# Patient Record
Sex: FEMALE | Race: WHITE | Employment: FULL TIME | URBAN - METROPOLITAN AREA
[De-identification: names, ages, dates, MRNs, and addresses within clinical notes are randomized per-mention and may not be internally consistent; named-entity substitution may affect disease eponyms.]

---

## 2017-01-13 ENCOUNTER — TELEPHONE (OUTPATIENT)
Dept: PEDIATRICS | Facility: CLINIC | Age: 22
End: 2017-01-13

## 2017-01-13 ENCOUNTER — HOSPITAL ENCOUNTER (EMERGENCY)
Facility: CLINIC | Age: 22
Discharge: HOME | End: 2017-01-13
Attending: EMERGENCY MEDICINE | Admitting: EMERGENCY MEDICINE
Payer: COMMERCIAL

## 2017-01-13 VITALS
RESPIRATION RATE: 20 BRPM | DIASTOLIC BLOOD PRESSURE: 102 MMHG | HEART RATE: 111 BPM | TEMPERATURE: 98 F | SYSTOLIC BLOOD PRESSURE: 166 MMHG | OXYGEN SATURATION: 97 %

## 2017-01-13 DIAGNOSIS — K92.1 BLOOD IN STOOL: ICD-10-CM

## 2017-01-13 DIAGNOSIS — R00.0 TACHYCARDIA: ICD-10-CM

## 2017-01-13 LAB
APTT PPP: 30 SEC (ref 22–37)
BASOPHILS # BLD AUTO: 0.1 10E9/L (ref 0–0.2)
BASOPHILS NFR BLD AUTO: 0.4 %
DIFFERENTIAL METHOD BLD: ABNORMAL
EOSINOPHIL # BLD AUTO: 0.2 10E9/L (ref 0–0.7)
EOSINOPHIL NFR BLD AUTO: 1.9 %
ERYTHROCYTE [DISTWIDTH] IN BLOOD BY AUTOMATED COUNT: 15.4 % (ref 10–15)
HCG SERPL QL: NEGATIVE
HCT VFR BLD AUTO: 37.4 % (ref 35–47)
HGB BLD-MCNC: 12.1 G/DL (ref 11.7–15.7)
IMM GRANULOCYTES # BLD: 0 10E9/L (ref 0–0.4)
IMM GRANULOCYTES NFR BLD: 0.3 %
INR PPP: 0.93 (ref 0.86–1.14)
LYMPHOCYTES # BLD AUTO: 3 10E9/L (ref 0.8–5.3)
LYMPHOCYTES NFR BLD AUTO: 24.9 %
MCH RBC QN AUTO: 26.4 PG (ref 26.5–33)
MCHC RBC AUTO-ENTMCNC: 32.4 G/DL (ref 31.5–36.5)
MCV RBC AUTO: 82 FL (ref 78–100)
MONOCYTES # BLD AUTO: 0.8 10E9/L (ref 0–1.3)
MONOCYTES NFR BLD AUTO: 6.9 %
NEUTROPHILS # BLD AUTO: 8 10E9/L (ref 1.6–8.3)
NEUTROPHILS NFR BLD AUTO: 65.6 %
NRBC # BLD AUTO: 0 10*3/UL
NRBC BLD AUTO-RTO: 0 /100
PLATELET # BLD AUTO: 365 10E9/L (ref 150–450)
RBC # BLD AUTO: 4.59 10E12/L (ref 3.8–5.2)
WBC # BLD AUTO: 12.2 10E9/L (ref 4–11)

## 2017-01-13 PROCEDURE — 99283 EMERGENCY DEPT VISIT LOW MDM: CPT

## 2017-01-13 PROCEDURE — 84703 CHORIONIC GONADOTROPIN ASSAY: CPT | Performed by: EMERGENCY MEDICINE

## 2017-01-13 PROCEDURE — 85610 PROTHROMBIN TIME: CPT | Performed by: EMERGENCY MEDICINE

## 2017-01-13 PROCEDURE — 85025 COMPLETE CBC W/AUTO DIFF WBC: CPT | Performed by: EMERGENCY MEDICINE

## 2017-01-13 PROCEDURE — 85730 THROMBOPLASTIN TIME PARTIAL: CPT | Performed by: EMERGENCY MEDICINE

## 2017-01-13 ASSESSMENT — ENCOUNTER SYMPTOMS
FEVER: 0
BLOOD IN STOOL: 1
ABDOMINAL PAIN: 0
DIARRHEA: 1
NAUSEA: 0
VOMITING: 0

## 2017-01-13 NOTE — ED PROVIDER NOTES
History     Chief Complaint:  Rectal Bleeding    HPI   Susy Huerta is a 21 year old female who presents to the emergency department today for evaluation of rectal bleeding. The patient reports that for the past couple of months she has had some intermittent bright red blood drip out of her rectum after bowel movements. She affirms that her stool is not mixed with blood, but rather dripping out after a bowel movement. She states that this is painless, but is concerned today as this is the second time this has happened and was told that her grandfather has a history of colon cancer. She notes that the blood is not mixed with the stool and reports no known history of hemorrhoids or fissures. The patient does note that she has frequent bowel movements however; sometimes up to 4 per day. She denies any alcohol or excessive NSAID use, fevers, abdominal pain, or other concerns at this time.     Allergies:  Amoxicillin - Anaphylaxis  Cefzil - Hives  Sulfa Drugs - N/V      Medications:    Zoloft  Glucophage  Depo-Provera  Diprosone  Hydrocortisone cream 2.5%     Past Medical History:    Obese  Hypertension  Menorrhagia  Endometriosis      Past Surgical History:    Dilation and curretage     Family History:    Mother - Hypertension  Maternal Grandmother - Hypertension, Lung Cancer, Emphysema  Maternal Grandfather - Colon Cancer     Social History:  The patient was accompanied to the ED by her mother and sister.  Smoking Status: Negative  Alcohol Use: Negative  Marital Status:  Single [1]     Review of Systems   Constitutional: Negative for fever.   Gastrointestinal: Positive for diarrhea and blood in stool (Bright Red). Negative for nausea, vomiting and abdominal pain.   All other systems reviewed and are negative.    Physical Exam   Vitals:  Patient Vitals for the past 24 hrs:   BP Temp Temp src Pulse Resp SpO2   01/13/17 1900 - - - - - 97 %   01/13/17 1736 - - - - - 95 %   01/13/17 1732 - - - - - 98 %   01/13/17 1624 (!)  166/102 mmHg 98  F (36.7  C) Oral 111 20 100 %      Physical Exam\  General: Morbidly obese, appears comfortable.  HEENT:   The scalp and head appear normal    The pupils are equal, round, and reactive to light    Extraocular muscles are intact.    The nose is normal.    The oropharynx is normal.      Uvula is in the midline.  There is no peritonsillar abscess.  Neck:  Normal range of motion.    Lungs:  Clear.      No rales, no wheezing.      There is no tachypnea.  Non-labored.  Cardiac: Regular rate.      Normal S1 and S2.      No S3 or S4.      No pathological murmur.      No pericardial rub.  Abdomen: Soft. No distension. No tympani. No rebound. Non-tender.  :   Purplish interior hemorrhoids seen at the 6 o'clock and 9 o'clock position    No evidence of fissures. No pain on anoscopic evaluation. No external hemorrhoids. No blood noted. Clear mucous.   Lymph: No anterior or posterior cervical lymphadenopathy noted.  MS:  Normal tone.      Normal movement of all extremities.    Neuro:  Normal mentation.  No focal motor or sensory changes.      Speech normal.  Psych:  Awake.     Alert.      Normal affect.      Appropriate interactions.  Skin:  No rash.      No lesions.    Emergency Department Course     Laboratory:  Laboratory findings were communicated with the patient who voiced understanding of the findings.    CBC: WBC: 12.2 (H) o/w WNL. (HGB 12.1, )   HCG Qualitative Urine: Negative    INR: Pending  PTT: Pending    Emergency Department Course:  Nursing notes and vitals reviewed.  I performed an exam of the patient as documented above.   IV was inserted and blood was drawn for laboratory testing, results above.  At 1821 the patient was rechecked and was updated on the results of her laboratory and imaging studies.   I discussed the treatment plan with the patient and her mother. They expressed understanding of this plan and consented to discharge. They will be discharged home with instructions for care  "and follow up. In addition, the patient will return to the emergency department if their symptoms persist, worsen, if new symptoms arise or if there is any concern.  All questions were answered.  I personally reviewed the laboratory results with the patient and answered all related questions prior to discharge.    Impression & Plan      Medical Decision Making:  Susy Huerta is a 21 year old female who presents to the emergency department today for evaluation of internal hemorrhoids which is likely the source of her bright red blood per rectum and painless nature of her bleeding.     However, with her family history of maternal grandfather having had colon cancer and dying at age 63, I have recommended that she follow up with colorectal surgery. They can do further investigation if she continues to have bright red bleeding after banding of her internal hemorrhoids. I have recommended that she follow up [facility] and gave her the number to rubén.    In addition her heart rate was noted to be elevated. She admits to drinking coffee today and not drinking clear liquids well such as water or juice. She says \"I am known not to drink much.\" I recommended she push clear liquid hydration and avoiding diuretic beverages including caffeine and alcohol and have this rechecked on Monday a her primary doctors office. If she has any new bleeding or other concerns she should return immediately to the ED.    Diagnosis:    ICD-10-CM    1. Blood in stool K92.1    2. Tachycardia R00.0        Discharge Medications:  Discharge Medication List as of 1/13/2017  6:57 PM      START taking these medications    Details   psyllium 0.52 G capsule Take 1 capsule (0.52 g) by mouth daily, Disp-20 capsule, R-0, Local Print             Scribe Disclosure:  INoman, am serving as a scribe at 5:26 PM on 1/13/2017 to document services personally performed by Les Case MD, based on my " observations and the provider's statements to me.    1/13/2017   [facility] EMERGENCY DEPARTMENT        Les Case MD  01/13/17 4009

## 2017-01-13 NOTE — TELEPHONE ENCOUNTER
Susy Huerta is a 21 year old female who calls with rectal bleeding.    NURSING ASSESSMENT:  Description: dripping bright red rectal blood, not bleeding with BM.  Has last BM 2 days ago and it was normal, no blood in stool.  Dripping on the underwear, into the toilet.  She is positive it is rectal bleeding, not vaginal.  Onset/duration:  7 days, getting worse.  First time patient called.  Precip. factors:  Family hx of colon cancer  Associated symptoms:  Denies abdominal pain, or cramping,  No fevers.  No chills, or sweats.  No hx hemorrhoids.  Improves/worsens symptoms:  n/a  Pain scale (0-10)   0/10  LMP/preg/breast feeding:  unsure  Last exam/Treatment:  12/15/16 for stools-changes in stools  Allergies:   Allergies   Allergen Reactions     Amoxicillin Anaphylaxis     Cefzil [Cefprozil] Hives     Sulfa Drugs Other (See Comments)     n/v       MEDICATIONS:   Taking medication(s) as prescribed? Yes  Taking over the counter medication(s?) No  Any medication side effects? Not Applicable    Any barriers to taking medication(s) as prescribed?  No  Medication(s) improving/managing symptoms?  N/A  Medication reconciliation completed: Yes      NURSING PLAN: Nursing advice to patient ER evaluation    RECOMMENDED DISPOSITION:  To ED, another person to drive - mom will drive  Will comply with recommendation: Yes  If further questions/concerns or if symptoms do not improve, worsen or new symptoms develop, call your PCP or [facility] as soon as possible.      Guideline used:  Nursing experience    Janie Allen RN

## 2017-01-13 NOTE — ED AVS SNAPSHOT
[facility] Emergency Department    [address]    [facility] [address]    Phone:  [phone]    Fax:  [phone]                                       Susy Huerta   MRN: [id number]    Department:  [facility] Emergency Department   Date of Visit:  1/13/2017           After Visit Summary Signature Page     I have received my discharge instructions, and my questions have been answered. I have discussed any challenges I see with this plan with the nurse or doctor.    ..........................................................................................................................................  Patient/Patient Representative Signature      ..........................................................................................................................................  Patient Representative Print Name and Relationship to Patient    ..................................................               ................................................  Date                                            Time    ..........................................................................................................................................  Reviewed by Signature/Title    ...................................................              ..............................................  Date                                                            Time

## 2017-01-13 NOTE — ED AVS SNAPSHOT
[facility] Emergency Department    [address] [place] [address]    Phone:  [phone]    Fax:  [phone]                                       Susy Huerta   MRN: [id number]    Department:  [facility] Emergency Department   Date of Visit:  1/13/2017           Patient Information     Date Of Birth          1995        Your diagnoses for this visit were:     Blood in stool     Tachycardia        You were seen by Les Case MD.      Follow-up Information     Follow up with Yohan Beltran MD In 3 days.    Specialties:  Internal Medicine, Pediatrics    Contact information:    [facility]  [address] [place] [id number] [phone]          Follow up with Jessenia Kumar MD In 3 days.    Specialty:  Colon and Rectal Surgery    Contact information:    [facility]  [address] [place] [address]  [phone]          Discharge Instructions         Dehydration (Adult)  Dehydration occurs when your body loses too much fluid. This may be the result of prolonged vomiting or diarrhea, excessive sweating, or a high fever. It may also happen if you don t drink enough fluid when you re sick or out in the heat. Misuse of diuretics (water pills) can also be a cause.  Symptoms include thirst and decreased urine output. You may also feel dizzy, weak, fatigued, or very drowsy. The diet described below is usually enough to treat dehydration. In some cases, you may need medicine.  Home care    Drink at least 12 8-ounce glasses of fluid every day to resolve the dehydration. Fluid may include water; orange juice; lemonade; apple, grape, or cranberry juice; clear fruit drinks; electrolyte replacement and sports drinks; and teas and coffee without caffeine. If you have been diagnosed with a kidney disease, ask your doctor how much and what types of fluids you should drink to prevent dehydration. If you have kidney disease, fluid can build  up in the body. This can be dangerous to your health.     If you have a fever, muscle aches, or a headache as a result of a cold or flu, you may take acetaminophen or ibuprofen, unless another medicine was prescribed. If you have chronic liver or kidney disease, or have ever had a stomach ulcer or gastrointestinal bleeding, talk with your health care provider before using these medicines. Don't take aspirin if you are younger than 18 and have a fever. Aspirin raises the chance for severe liver injury.  Follow-up care  Follow up with your health care provider, or as advised.  When to seek medical advice  Call your health care provider right away if any of these occur:    Continued vomiting    Frequent diarrhea (more than 5 times a day); blood (red or black color) or mucus in diarrhea    Blood in vomit or stool    Swollen abdomen or increasing abdominal pain    Weakness, dizziness, or fainting    Unusual drowsiness or confusion    Reduced urine output or extreme thirst    Fever of 100.4 F (34 C) or higher     1481-9583 [employer]. [address] [place] [address]. All rights reserved. This information is not intended as a substitute for professional medical care. Always follow your healthcare professional's instructions.            When You Have Gastrointestinal (GI) Bleeding  Blood in vomit or stool can be a sign of gastrointestinal (GI) bleeding. GI bleeding can be scary, though the cause of the bleeding may not be serious. You should always see a doctor if GI bleeding occurs.  The GI tract    The GI tract is the path through which food travels in the body. Food passes from the mouth down the esophagus (the tube from the mouth to the stomach). Food begins to break down in the stomach. It then moves through the duodenum, the first part of the small intestine. Nutrients are absorbed as food travels through the small intestine. What is left passes into the colon (large intestine) as waste. The  colon removes water from the waste. Waste continues from the colon to the rectum (where stool is stored). Waste then leaves the body through the anus.  Causes of GI bleeding  GI bleeding can be caused by many different problems. Some of the more common causes include:    Hemorrhoids (swollen veins in the anus)    Varices (swollen veins in the esophagus)    Ulcer (sore on the lining of the GI tract)    Cuts or scrapes in the mouth or throat    Infection (bacteria or parasites)    Food allergies, such as gluten    Medications    Inflammation (swelling or irritation of the lining of the GI tract, such as gastritis or esophagitis)    Colitis (Crohn's disease or ulcerative colitis)    Cancer (tumors or polyps)    Diverticula (abnormal pouches in the colon)    Tears in the esophagus or anus    Nosebleed    Angiodysplasia, abnormal blood vessels in the GI tract  Diagnosing the cause of blood in stool  If blood is coming out in your stool, it may signal a lower GI tract problem. Bleeding from the GI tract can be bright red, or it may look dark and tarry. Occult blood can t be seen with the eye, but can be found in the stool on tests. To determine the cause, tests that may be ordered include:    Blood tests    Hemoccult test: checks a stool sample for blood    Stool culture: checks a stool sample for bacteria or parasites    X-ray, ultrasound, or CT scan: imaging tests that take pictures of the digestive tract    Colonoscopy or sigmoidoscopy: a test during which a flexible tube with a camera is inserted through the anus into the rectum to view the inside of your colon. This lets the doctor do a biopsy (take a tiny tissue sample and treat a bleeding source).  Diagnosing the cause of blood in vomit  Vomiting blood or a coffee ground material may signal an upper GI tract problem. To find the cause, tests that may be done include:    Upper Endoscopy: a test during which a flexible tube with a camera is inserted through the mouth  and throat to see inside the upper GI tract. This lets the doctor do a biopsy (take a tiny tissue sample and treat a bleeding source).    Nasogastric lavage: which can distinguish upper versus lower GI bleeding    X-ray, ultrasound, or CT scan: tests that take pictures of the digestive tract    Upper GI series: X-rays of the upper part of the GI tract taken from inside the body    Enteroscopy: sending a flexible tube or a small, swallowed capsule camer into the small intestine      Call your health care provider right away if you have any of the following:    Bleeding from the mouth or anus that can t be stopped    Fever of 100.4 F (38.0 ) or higher    Bleeding accompanied by lightheadedness or dizziness    Signs of dehydration (dry, sticky mouth; decreased urine output; very dark urine)    Abdominal pain     4468-4039 The "[employer] [address] [place] [address]. All rights reserved. This information is not intended as a substitute for professional medical care. Always follow your healthcare professional's instructions.    HAVE YOUR HEART RATE RECHECKED AT CLINIC ON Monday- IT SHOULD BE LESS THAN 100 NORMALLY IF YOU ARE NOT DEHYDRATED      Future Appointments        Provider Department Dept Phone Center    1/20/2017 4:00 PM AISHWARYA NURSE AB [facility] [phone] [facility]      24 Hour Appointment Hotline       To make an appointment at any [facility], call [phone] ([phone]). If you don't have a family doctor or clinic, we will help you find one. [facility] are conveniently located to serve the needs of you and your family.             Review of your medicines      START taking        Dose / Directions Last dose taken    psyllium 0.52 G capsule   Dose:  1 capsule   Quantity:  20 capsule        Take 1 capsule (0.52 g) by mouth daily   Refills:  0          Our records show that you are taking the medicines listed below. If these are incorrect, please call your family  doctor or clinic.        Dose / Directions Last dose taken    betamethasone dipropionate 0.05 % cream   Commonly known as:  DIPROSONE   Quantity:  45 g        Apply sparingly to affected area twice daily as needed.  Do not apply to face.   Refills:  0        hydrocortisone 2.5 % cream   Quantity:  20 g        Apply topically 2 times daily Can sub form if needed and size as needed   Refills:  3        medroxyPROGESTERone 150 MG/ML injection   Commonly known as:  DEPO-PROVERA   Dose:  150 mg   Quantity:  3 mL        Inject 1 mL (150 mg) into the muscle every 3 months   Refills:  3        metFORMIN 500 MG tablet   Commonly known as:  GLUCOPHAGE   Dose:  500 mg   Quantity:  60 tablet        Take 1 tablet (500 mg) by mouth 2 times daily (with meals)   Refills:  3        sertraline 100 MG tablet   Commonly known as:  ZOLOFT   Dose:  100 mg   Quantity:  90 tablet        Take 1 tablet (100 mg) by mouth daily   Refills:  0                Prescriptions were sent or printed at these locations (1 Prescription)                   Other Prescriptions                Printed at Department/Unit printer (1 of 1)         psyllium 0.52 G capsule                Procedures and tests performed during your visit     CBC with platelets differential    HCG QUALitative    INR    Partial thromboplastin time    Patient care order      Orders Needing Specimen Collection     None      Pending Results     Date and Time Order Name Status Description    1/13/2017 1726 Partial thromboplastin time In process     1/13/2017 1726 INR In process             Pending Culture Results     No orders found from 1/12/2017 to 1/14/2017.       Test Results from your hospital stay           1/13/2017  5:53 PM - Interface, Aivo Results      Component Results     Component Value Ref Range & Units Status    WBC 12.2 (H) 4.0 - 11.0 10e9/L Final    RBC Count 4.59 3.8 - 5.2 10e12/L Final    Hemoglobin 12.1 11.7 - 15.7 g/dL Final    Hematocrit 37.4 35.0 - 47.0 % Final     MCV 82 78 - 100 fl Final    MCH 26.4 (L) 26.5 - 33.0 pg Final    MCHC 32.4 31.5 - 36.5 g/dL Final    RDW 15.4 (H) 10.0 - 15.0 % Final    Platelet Count 365 150 - 450 10e9/L Final    Diff Method Automated Method  Final    % Neutrophils 65.6 % Final    % Lymphocytes 24.9 % Final    % Monocytes 6.9 % Final    % Eosinophils 1.9 % Final    % Basophils 0.4 % Final    % Immature Granulocytes 0.3 % Final    Nucleated RBCs 0 0 /100 Final    Absolute Neutrophil 8.0 1.6 - 8.3 10e9/L Final    Absolute Lymphocytes 3.0 0.8 - 5.3 10e9/L Final    Absolute Monocytes 0.8 0.0 - 1.3 10e9/L Final    Absolute Eosinophils 0.2 0.0 - 0.7 10e9/L Final    Absolute Basophils 0.1 0.0 - 0.2 10e9/L Final    Abs Immature Granulocytes 0.0 0 - 0.4 10e9/L Final    Absolute Nucleated RBC 0.0  Final         1/13/2017  6:11 PM - Interface, Flexilab Results      Component Results     Component Value Ref Range & Units Status    HCG Qualitative Serum Negative NEG Final         1/13/2017  5:48 PM - Interface, Flexilab Results         1/13/2017  5:48 PM - Interface, Flexilab Results                Clinical Quality Measure: Blood Pressure Screening     Your blood pressure was checked while you were in the emergency department today. The last reading we obtained was  BP: (!) 166/102 mmHg . Please read the guidelines below about what these numbers mean and what you should do about them.  If your systolic blood pressure (the top number) is less than 120 and your diastolic blood pressure (the bottom number) is less than 80, then your blood pressure is normal. There is nothing more that you need to do about it.  If your systolic blood pressure (the top number) is 120-139 or your diastolic blood pressure (the bottom number) is 80-89, your blood pressure may be higher than it should be. You should have your blood pressure rechecked within a year by a primary care provider.  If your systolic blood pressure (the top number) is 140 or greater or your diastolic blood  "pressure (the bottom number) is 90 or greater, you may have high blood pressure. High blood pressure is treatable, but if left untreated over time it can put you at risk for heart attack, stroke, or kidney failure. You should have your blood pressure rechecked by a primary care provider within the next 4 weeks.  If your provider in the emergency department today gave you specific instructions to follow-up with your doctor or provider even sooner than that, you should follow that instruction and not wait for up to 4 weeks for your follow-up visit.        Thank you for choosing [facility]       Thank you for choosing [facility] for your care. Our goal is always to provide you with excellent care. Hearing back from our patients is one way we can continue to improve our services. Please take a few minutes to complete the written survey that you may receive in the mail after you visit with us. Thank you!        [facility] [employer] Information     [employer] lets you send messages to your doctor, view your test results, renew your prescriptions, schedule appointments and more. To sign up, go to [email or web address] . Click on \"Log in\" on the left side of the screen, which will take you to the Welcome page. Then click on \"Sign up Now\" on the right side of the page.     You will be asked to enter the access code listed below, as well as some personal information. Please follow the directions to create your username and password.     Your access code is: [email or web address]  Expires: 3/15/2017  2:41 PM     Your access code will  in 90 days. If you need help or a new code, please call your [facility] clinic or [phone].        Care EveryWhere ID     This is your Care EveryWhere ID. This could be used by other organizations to access your [facility] medical records  [id number]        After Visit Summary       This is your record. Keep this with you and show to your community pharmacist(s) and doctor(s) at your next visit.                  "

## 2017-01-13 NOTE — ED NOTES
A&Ox3, ABC's intact  Pt c/o bright red bleeding per rectum after BM's on and off for a couple months.  PMH: See hx  Meds: Epic up to date per pt

## 2017-01-14 NOTE — DISCHARGE INSTRUCTIONS
Dehydration (Adult)  Dehydration occurs when your body loses too much fluid. This may be the result of prolonged vomiting or diarrhea, excessive sweating, or a high fever. It may also happen if you don t drink enough fluid when you re sick or out in the heat. Misuse of diuretics (water pills) can also be a cause.  Symptoms include thirst and decreased urine output. You may also feel dizzy, weak, fatigued, or very drowsy. The diet described below is usually enough to treat dehydration. In some cases, you may need medicine.  Home care    Drink at least 12 8-ounce glasses of fluid every day to resolve the dehydration. Fluid may include water; orange juice; lemonade; apple, grape, or cranberry juice; clear fruit drinks; electrolyte replacement and sports drinks; and teas and coffee without caffeine. If you have been diagnosed with a kidney disease, ask your doctor how much and what types of fluids you should drink to prevent dehydration. If you have kidney disease, fluid can build up in the body. This can be dangerous to your health.     If you have a fever, muscle aches, or a headache as a result of a cold or flu, you may take acetaminophen or ibuprofen, unless another medicine was prescribed. If you have chronic liver or kidney disease, or have ever had a stomach ulcer or gastrointestinal bleeding, talk with your health care provider before using these medicines. Don't take aspirin if you are younger than 18 and have a fever. Aspirin raises the chance for severe liver injury.  Follow-up care  Follow up with your health care provider, or as advised.  When to seek medical advice  Call your health care provider right away if any of these occur:    Continued vomiting    Frequent diarrhea (more than 5 times a day); blood (red or black color) or mucus in diarrhea    Blood in vomit or stool    Swollen abdomen or increasing abdominal pain    Weakness, dizziness, or fainting    Unusual drowsiness or confusion    Reduced  urine output or extreme thirst    Fever of 100.4 F (34 C) or higher     2617-3170 [employer]. [address] [place] [address], [place] [address]. All rights reserved. This information is not intended as a substitute for professional medical care. Always follow your healthcare professional's instructions.            When You Have Gastrointestinal (GI) Bleeding  Blood in vomit or stool can be a sign of gastrointestinal (GI) bleeding. GI bleeding can be scary, though the cause of the bleeding may not be serious. You should always see a doctor if GI bleeding occurs.  The GI tract    The GI tract is the path through which food travels in the body. Food passes from the mouth down the esophagus (the tube from the mouth to the stomach). Food begins to break down in the stomach. It then moves through the duodenum, the first part of the small intestine. Nutrients are absorbed as food travels through the small intestine. What is left passes into the colon (large intestine) as waste. The colon removes water from the waste. Waste continues from the colon to the rectum (where stool is stored). Waste then leaves the body through the anus.  Causes of GI bleeding  GI bleeding can be caused by many different problems. Some of the more common causes include:    Hemorrhoids (swollen veins in the anus)    Varices (swollen veins in the esophagus)    Ulcer (sore on the lining of the GI tract)    Cuts or scrapes in the mouth or throat    Infection (bacteria or parasites)    Food allergies, such as gluten    Medications    Inflammation (swelling or irritation of the lining of the GI tract, such as gastritis or esophagitis)    Colitis (Crohn's disease or ulcerative colitis)    Cancer (tumors or polyps)    Diverticula (abnormal pouches in the colon)    Tears in the esophagus or anus    Nosebleed    Angiodysplasia, abnormal blood vessels in the GI tract  Diagnosing the cause of blood in stool  If blood is coming out in your stool, it may  signal a lower GI tract problem. Bleeding from the GI tract can be bright red, or it may look dark and tarry. Occult blood can t be seen with the eye, but can be found in the stool on tests. To determine the cause, tests that may be ordered include:    Blood tests    Hemoccult test: checks a stool sample for blood    Stool culture: checks a stool sample for bacteria or parasites    X-ray, ultrasound, or CT scan: imaging tests that take pictures of the digestive tract    Colonoscopy or sigmoidoscopy: a test during which a flexible tube with a camera is inserted through the anus into the rectum to view the inside of your colon. This lets the doctor do a biopsy (take a tiny tissue sample and treat a bleeding source).  Diagnosing the cause of blood in vomit  Vomiting blood or a coffee ground material may signal an upper GI tract problem. To find the cause, tests that may be done include:    Upper Endoscopy: a test during which a flexible tube with a camera is inserted through the mouth and throat to see inside the upper GI tract. This lets the doctor do a biopsy (take a tiny tissue sample and treat a bleeding source).    Nasogastric lavage: which can distinguish upper versus lower GI bleeding    X-ray, ultrasound, or CT scan: tests that take pictures of the digestive tract    Upper GI series: X-rays of the upper part of the GI tract taken from inside the body    Enteroscopy: sending a flexible tube or a small, swallowed capsule camer into the small intestine      Call your health care provider right away if you have any of the following:    Bleeding from the mouth or anus that can t be stopped    Fever of 100.4 F (38.0 ) or higher    Bleeding accompanied by lightheadedness or dizziness    Signs of dehydration (dry, sticky mouth; decreased urine output; very dark urine)    Abdominal pain     2681-3744 [employer]. [address] [place] [address], [place] [address]. All rights reserved. This information is not  intended as a substitute for professional medical care. Always follow your healthcare professional's instructions.    HAVE YOUR HEART RATE RECHECKED AT CLINIC ON Monday- IT SHOULD BE LESS THAN 100 NORMALLY IF YOU ARE NOT DEHYDRATED

## 2017-01-20 ENCOUNTER — OFFICE VISIT (OUTPATIENT)
Dept: NURSING | Facility: CLINIC | Age: 22
End: 2017-01-20
Payer: COMMERCIAL

## 2017-01-20 VITALS — DIASTOLIC BLOOD PRESSURE: 60 MMHG | HEART RATE: 101 BPM | SYSTOLIC BLOOD PRESSURE: 136 MMHG

## 2017-01-20 LAB — BETA HCG QUAL IFA URINE: NEGATIVE

## 2017-01-20 PROCEDURE — 99207 ZZC NO CHARGE NURSE ONLY: CPT

## 2017-01-20 PROCEDURE — 96372 THER/PROPH/DIAG INJ SC/IM: CPT

## 2017-01-20 PROCEDURE — 84703 CHORIONIC GONADOTROPIN ASSAY: CPT | Performed by: INTERNAL MEDICINE

## 2017-01-23 ENCOUNTER — OFFICE VISIT (OUTPATIENT)
Dept: PODIATRY | Facility: CLINIC | Age: 22
End: 2017-01-23
Payer: COMMERCIAL

## 2017-01-23 VITALS
DIASTOLIC BLOOD PRESSURE: 70 MMHG | HEIGHT: 70 IN | WEIGHT: 293 LBS | BODY MASS INDEX: 41.95 KG/M2 | SYSTOLIC BLOOD PRESSURE: 118 MMHG

## 2017-01-23 DIAGNOSIS — M79.673 ARCH PAIN, UNSPECIFIED LATERALITY: Primary | ICD-10-CM

## 2017-01-23 PROCEDURE — 99203 OFFICE O/P NEW LOW 30 MIN: CPT | Performed by: PODIATRIST

## 2017-01-23 NOTE — MR AVS SNAPSHOT
After Visit Summary   1/23/2017    Susy Huerta    MRN: [id number]           Patient Information     Date Of Birth          1995        Visit Information        Provider Department      1/23/2017 10:15 AM Daniel Jaime DPM [facility]'s Diagnoses     Arch pain, unspecified laterality    -  1       Care Instructions    Follow Up - 4 weeks    Dr. Jaime's Clinic Locations:         Monday Tuesday   [facility]   [address], [address]   [place] [address] [place] [address]   [phone]       Wednesday:  Surgery Day    Surgery Scheduling Line - [phone]       Thursday Morning Thursday Afternoon   [facility]   [address], [address]   [place] [address] [place], [place] [address] [phone]       Friday Morning To Schedule an Appointment    [facility] Call: [phone] [address] [place] [address]  [phone] PLEASE FAX ALL FORMS TO: [phone]       PLANTAR FASCIITIS   What is plantar fasciitis?   Plantar fasciitis is often referred to as heel spurs or heel pain. Plantar fasciitis is a very common problem that affects people of all foot shapes, age, weight and activity level. Pain may be in the arch or on the weight-bearing surface of the heel. The pain maycome on without injury or identifiable cause. Pain is generally present when first getting out of bed in the morning or up from a seated break.   What causes plantar fasciitis?   The plantar fascia is a dense fibrous band of tissue that stretches across the bottom surface of the foot. The fascia helps support the foot muscles and arch. Plantar fasciitis is thought to be caused by mechanical strain or overload. Frequent walking without shoes or wearing unsupportive shoes is thought to cause structural  overload and ultimately inflammation of the plantar fascia. Some people have heel spurs that can be seen on x-ray. The heel spur is actually a minor component of plantar fascitis and is largely ignored.   How long will this last?   Plantar fasciitis can last from one day to a lifetime. Some people get intermittent fascitis that is very short-lived. Others suffer daily for years. Excessive body weight, frequent bare foot walking, long hours on the feet, inadequate shoes, predisposing foot structures and excessive activity such as running are all potential issues that lead to chronic and/or recurring plantar fascitis. Having plantar fasciitis means that you are forever prone to this problem and will require modification of some of the above factors. Most people seek treatment within one to four months. Healing usually requires a similar one to four month time frame. Healing time is relative to the amount of effort spent treating the problem.   What can I do?   The easiest solution is to stop walking around your home without shoes. Plantar fasciitis is largely a shoe problem. Shoes are either not being worn often enough or your current shoes are inadequate for your weight, foot structure or activity level. The majority of shoes on the market today are not sufficient to resist development of plantar fasciitis or to promote healing. Assume that your current shoes are inadequate and will need to be replaced. Even high quality shoes wear out with 6 months to one year of frequent use. Weightloss is another option. Losing ten pounds in the next two months may be enough to resolve the problem. Ice applied to the area of pain two to three times per day for ten minutes each session can be very helpful. This should continue until the problem resolves. Achilles tendon stretching is essential. Stretch multiple times daily to promote healing and to prevent recurrence in the future.     What if this does not help?   Medical  treatments often include custom arch supports, cortisone injections, physical therapy, splints to be worn in bed, prescription medications and surgery. The home treatments listed above will be necessary regardless of these advanced medical treatments. Surgery is rarely needed but is very helpful in selected cases.     Heel pain in my future?   Plantar fasciitis is highly recurrent. Risk factors often continue, including return to bare foot walking, inadequate shoes, excessive body weight, excessive activities, etc. Your life style and foot structure may predispose you to recurrent plantar fasciitis. A daily prevention regimen can be very helpful. Ongoing use of shoe inserts, careful attention to appropriate shoes, daily Achilles stretching, etc. may prevent recurrence. Prompt attention at the earliest warning signs of heel pain can resolve the problem in as short as a few days.   Below are some exercises for Plantar fasciitis:  Stair exercise  You can step on the stairs with the ball of your foot and hold your position for at least 15 seconds, then slowly step down with the heels of your foot. You can do this daily and as often as you want. Plantar fasciitis exercise equipment and handout materials are useful in relieving pain.  Picking the towel  Plantar fasciitis exercise equipment and handout teach you how to exercise by picking the towel. You can sit comfortably and then pick the towel with your toes. Do this at least 10 to 20 times regularly. You can use any object other than a towel as long as the material can be soft and you can pick it up with your toes.  Rolling the bottle or ball  You can get a small ball or bottle and then roll it with your foot. Do this daily for at least 15 to 20 times. Plantar fasciitis exercise equipment and handout are very useful in treating the symptoms of the foot condition.  Stretching the calf  You could lie supine, raise one foot, and then point your toes towards the floor. Do  this daily for at least 15 to 20 times. The calf is connected to the heel and the balls of the foot, so you should also exercise this also. Plantar fasciitis exercise equipment and handout usually mentions the importance of exercising the calf also.  Flex the toes  Sit comfortably and then flex your toes by pointing it towards the floor or towards your body. This will relax and flex your foot and exercise your plantar fascia, the calf, and the Achilles tendon. The inability of the foot to stretch often causes the bunching up of the plantar fascia area leading to the pain.  Massaging the calf and the plantar fascia also helps a lot in alleviating the pain and preventing its recurrence. If you prefer standard plantar fasciitis exercise equipment and handout, then you can avail of this from legitimate sources. You can use the standard stretching device, the wheel, and the belt. These are all significant devices in treating the pain in the plantar fascia.    Therapies covered by Dr Jaime today:    1.  Supportive shoes, minimizing barefoot ambulation - helps to provide cushion, padding and support to the ligament that is inflamed.  Socks, flip flops, flats and some slippers are not typically sufficient to provide support.  Shoes should be worn even in-doors  2.  Insert/orthotics - inserts/orthotics that have an arch support built in to them provide further stress relief for the ligament.  3. Icing - using a frozen water bottle or orange, and rolling it along the bottom of the arch/heel can help to alleviate discomfort, and can act as a tissue massage to the painful, inflamed ligament.  There is evidence that shows icing at least three times daily can be beneficial  4.  Anti-inflammatory(NSAIDS)/Tylenol - anti-inflammatories, such as ibuprofen or Aleve, as well as Tylenol can be used to help decrease symptoms and improve pain levels.  If you have high blood pressure, heart disease, stomach or kidney problems, use  "anti-inflammatories sparingly.  Tylenol should not be used if you have liver problems.  If you can safely taken them, you can use NSAIDS and tylenol in combination for pain relief  5. Activity modifications - if there are certain things that you do, whether it's going barefoot or certain shoes/activities, you should try to minimize those activities as much as possible until your symptoms are sufficiently resolved.  Certainly, some activities, such as running on the treadmill, are easier to take a break from versus others, such as work or chores at home.  \"If there are certain activities that hurt your heel, and you keep doing those activities that hurt your heel, your heel will keep hurting\".    6.  Stretching - Stretching your Achilles and hamstring can help to decrease stress on the plantar fascia.                   Hold each stretch for 10 seconds.  Stretch 10 times per set, three sets per day.  Morning, afternoon and evening.  If your heel pain is very severe in the morning, consider doing the first set of stretches before you get out of bed.            If these initial therapies are insufficient, we have our tier 2 therapies that can more aggressively work to improve your symptoms and get you back to the activities that you enjoy.      Over the Counter Inserts    Super Feet are the most common and easiest to find.    Locations include any Zo[employer] Store, [employer] Sporting Gig[employer] in [place] on [address] [address] and in [place] on [address], [employer] in [place] on [address], [facility] Running Room in [place] on [address], [facility] Running Room in [place] on [address], [email or web address] in [place] on [address] and [employer] Shop in [place] on [address] and in [place] on [address].    Spenco can be found online and at [email or web address] Shoe Shop in [place] on [address], Run N' Fun in [facility] on [address], Gear Running Store in [place] on [address], [employer] in . " "[place] on [address] and South [employer] in [place] on [address].    Power Step can be a little harder to find.  Locations include [employer] N' Fun in [place] on [address], [place] in [place] [employer], Stop-over Store in [place] on [address] and online    Walk-Fit - [employer]     Arch Cradles - [facility]    **  A good high quality over the counter insert can cost around $40-$50.                  Follow-ups after your visit        Your next 10 appointments already scheduled     Apr 07, 2017  4:00 PM   Nurse Only with EA NURSE AB   [facility]    [address]  [address]  [facility] [address] [phone]              Who to contact     If you have questions or need follow up information about today's clinic visit or your schedule please contact [facility] directly at [phone].  Normal or non-critical lab and imaging results will be communicated to you by [employer]orxhart, letter or phone within 4 business days after the clinic has received the results. If you do not hear from us within 7 days, please contact the clinic through [employer] or phone. If you have a critical or abnormal lab result, we will notify you by phone as soon as possible.  Submit refill requests through [employer] or call your pharmacy and they will forward the refill request to us. Please allow 3 business days for your refill to be completed.          Additional Information About Your Visit        [employer] Information     [employer] lets you send messages to your doctor, view your test results, renew your prescriptions, schedule appointments and more. To sign up, go to [email or web address] . Click on \"Log in\" on the left side of the screen, which will take you to the Welcome page. Then click on \"Sign up Now\" on the right side of the page.     You will be asked to enter the access code listed below, as well as some personal information. Please follow the directions to create your " username and password.     Your access code is: [email or web address]  Expires: 3/15/2017  2:41 PM     Your access code will  in 90 days. If you need help or a new code, please call your [place] clinic or [phone].        Care EveryWhere ID     This is your Care EveryWhere ID. This could be used by other organizations to access your [place] medical records  [id number]         Blood Pressure from Last 3 Encounters:   17 136/60   17 166/102   12/15/16 132/78    Weight from Last 3 Encounters:   12/15/16 324 lb 9.6 oz (147.238 kg) (99.80 %*)   16 340 lb (154.223 kg) (99.81 %*)   16 346 lb 9.6 oz (157.217 kg) (99.82 %*)     * Growth percentiles are based on [facility] 2-20 Years data.              Today, you had the following     No orders found for display       Primary Care Provider Office Phone # Fax #    Yohan Beltran [phone]       [facility] [address] [place] [address]        Thank you!     Thank you for choosing [facility]  for your care. Our goal is always to provide you with excellent care. Hearing back from our patients is one way we can continue to improve our services. Please take a few minutes to complete the written survey that you may receive in the mail after your visit with us. Thank you!             Your Updated Medication List - Protect others around you: Learn how to safely use, store and throw away your medicines at www.disposemymeds.org.          This list is accurate as of: 17 10:42 AM.  Always use your most recent med list.                   Brand Name Dispense Instructions for use    betamethasone dipropionate 0.05 % cream    DIPROSONE    45 g    Apply sparingly to affected area twice daily as needed.  Do not apply to face.       hydrocortisone 2.5 % cream     20 g    Apply topically 2 times daily Can sub form if needed and size as needed       medroxyPROGESTERone 150 MG/ML injection    DEPO-PROVERA    3 mL    Inject 1 mL  (150 mg) into the muscle every 3 months       metFORMIN 500 MG tablet    GLUCOPHAGE    60 tablet    Take 1 tablet (500 mg) by mouth 2 times daily (with meals)       psyllium 0.52 G capsule     20 capsule    Take 1 capsule (0.52 g) by mouth daily       sertraline 100 MG tablet    ZOLOFT    90 tablet    Take 1 tablet (100 mg) by mouth daily

## 2017-01-23 NOTE — Clinical Note
Good morning  I saw Susy today for bilateral arch pain.  We reviewed tier 1 treatments, and will see her back in 4 weeks for a recheck.  Thanks  Daniel

## 2017-01-23 NOTE — NURSING NOTE
"Chief Complaint   Patient presents with     Foot Problems     BL arch pain x 6 months       Initial /70 mmHg  Ht 5' 10\" (1.778 m)  Wt 324 lb (146.965 kg)  BMI 46.49 kg/m2 Estimated body mass index is 46.49 kg/(m^2) as calculated from the following:    Height as of this encounter: 5' 10\" (1.778 m).    Weight as of this encounter: 324 lb (146.965 kg).    Olu Elise CMA ([facility])  Podiatry/Foot & Ankle Surgery  [facility]      "

## 2017-01-23 NOTE — PATIENT INSTRUCTIONS
Follow Up - 4 weeks    [facility] Locations:         Monday Tuesday   [facility]   [address], [address]   [place] [address] [place] [address]   [phone]       Wednesday:  Surgery Day    Surgery Scheduling Line - [phone]       Thursday Morning Thursday Afternoon   [facility]   [address], [address]   [place] [address] [place] [address]   [phone]       Friday Morning To Schedule an Appointment    [facility] Call: [phone] [address] [place] [address]  [phone] PLEASE FAX ALL FORMS TO: [phone]       PLANTAR FASCIITIS   What is plantar fasciitis?   Plantar fasciitis is often referred to as heel spurs or heel pain. Plantar fasciitis is a very common problem that affects people of all foot shapes, age, weight and activity level. Pain may be in the arch or on the weight-bearing surface of the heel. The pain maycome on without injury or identifiable cause. Pain is generally present when first getting out of bed in the morning or up from a seated break.   What causes plantar fasciitis?   The plantar fascia is a dense fibrous band of tissue that stretches across the bottom surface of the foot. The fascia helps support the foot muscles and arch. Plantar fasciitis is thought to be caused by mechanical strain or overload. Frequent walking without shoes or wearing unsupportive shoes is thought to cause structural overload and ultimately inflammation of the plantar fascia. Some people have heel spurs that can be seen on x-ray. The heel spur is actually a minor component of plantar fascitis and is largely ignored.   How long will this last?   Plantar fasciitis can last from one day to a lifetime. Some people get intermittent fascitis that is very short-lived. Others suffer daily for  years. Excessive body weight, frequent bare foot walking, long hours on the feet, inadequate shoes, predisposing foot structures and excessive activity such as running are all potential issues that lead to chronic and/or recurring plantar fascitis. Having plantar fasciitis means that you are forever prone to this problem and will require modification of some of the above factors. Most people seek treatment within one to four months. Healing usually requires a similar one to four month time frame. Healing time is relative to the amount of effort spent treating the problem.   What can I do?   The easiest solution is to stop walking around your home without shoes. Plantar fasciitis is largely a shoe problem. Shoes are either not being worn often enough or your current shoes are inadequate for your weight, foot structure or activity level. The majority of shoes on the market today are not sufficient to resist development of plantar fasciitis or to promote healing. Assume that your current shoes are inadequate and will need to be replaced. Even high quality shoes wear out with 6 months to one year of frequent use. Weightloss is another option. Losing ten pounds in the next two months may be enough to resolve the problem. Ice applied to the area of pain two to three times per day for ten minutes each session can be very helpful. This should continue until the problem resolves. Achilles tendon stretching is essential. Stretch multiple times daily to promote healing and to prevent recurrence in the future.     What if this does not help?   Medical treatments often include custom arch supports, cortisone injections, physical therapy, splints to be worn in bed, prescription medications and surgery. The home treatments listed above will be necessary regardless of these advanced medical treatments. Surgery is rarely needed but is very helpful in selected cases.     Heel pain in my future?   Plantar fasciitis is highly  recurrent. Risk factors often continue, including return to bare foot walking, inadequate shoes, excessive body weight, excessive activities, etc. Your life style and foot structure may predispose you to recurrent plantar fasciitis. A daily prevention regimen can be very helpful. Ongoing use of shoe inserts, careful attention to appropriate shoes, daily Achilles stretching, etc. may prevent recurrence. Prompt attention at the earliest warning signs of heel pain can resolve the problem in as short as a few days.   Below are some exercises for Plantar fasciitis:  Stair exercise  You can step on the stairs with the ball of your foot and hold your position for at least 15 seconds, then slowly step down with the heels of your foot. You can do this daily and as often as you want. Plantar fasciitis exercise equipment and handout materials are useful in relieving pain.  Picking the towel  Plantar fasciitis exercise equipment and handout teach you how to exercise by picking the towel. You can sit comfortably and then pick the towel with your toes. Do this at least 10 to 20 times regularly. You can use any object other than a towel as long as the material can be soft and you can pick it up with your toes.  Rolling the bottle or ball  You can get a small ball or bottle and then roll it with your foot. Do this daily for at least 15 to 20 times. Plantar fasciitis exercise equipment and handout are very useful in treating the symptoms of the foot condition.  Stretching the calf  You could lie supine, raise one foot, and then point your toes towards the floor. Do this daily for at least 15 to 20 times. The calf is connected to the heel and the balls of the foot, so you should also exercise this also. Plantar fasciitis exercise equipment and handout usually mentions the importance of exercising the calf also.  Flex the toes  Sit comfortably and then flex your toes by pointing it towards the floor or towards your body. This will  relax and flex your foot and exercise your plantar fascia, the calf, and the Achilles tendon. The inability of the foot to stretch often causes the bunching up of the plantar fascia area leading to the pain.  Massaging the calf and the plantar fascia also helps a lot in alleviating the pain and preventing its recurrence. If you prefer standard plantar fasciitis exercise equipment and handout, then you can avail of this from legitimate sources. You can use the standard stretching device, the wheel, and the belt. These are all significant devices in treating the pain in the plantar fascia.    Therapies covered by Dr Jaime today:    1.  Supportive shoes, minimizing barefoot ambulation - helps to provide cushion, padding and support to the ligament that is inflamed.  Socks, flip flops, flats and some slippers are not typically sufficient to provide support.  Shoes should be worn even in-doors  2.  Insert/orthotics - inserts/orthotics that have an arch support built in to them provide further stress relief for the ligament.  3. Icing - using a frozen water bottle or orange, and rolling it along the bottom of the arch/heel can help to alleviate discomfort, and can act as a tissue massage to the painful, inflamed ligament.  There is evidence that shows icing at least three times daily can be beneficial  4.  Anti-inflammatory(NSAIDS)/Tylenol - anti-inflammatories, such as ibuprofen or Aleve, as well as Tylenol can be used to help decrease symptoms and improve pain levels.  If you have high blood pressure, heart disease, stomach or kidney problems, use anti-inflammatories sparingly.  Tylenol should not be used if you have liver problems.  If you can safely taken them, you can use NSAIDS and tylenol in combination for pain relief  5. Activity modifications - if there are certain things that you do, whether it's going barefoot or certain shoes/activities, you should try to minimize those activities as much as possible  "until your symptoms are sufficiently resolved.  Certainly, some activities, such as running on the treadmill, are easier to take a break from versus others, such as work or chores at home.  \"If there are certain activities that hurt your heel, and you keep doing those activities that hurt your heel, your heel will keep hurting\".    6.  Stretching - Stretching your Achilles and hamstring can help to decrease stress on the plantar fascia.                   Hold each stretch for 10 seconds.  Stretch 10 times per set, three sets per day.  Morning, afternoon and evening.  If your heel pain is very severe in the morning, consider doing the first set of stretches before you get out of bed.            If these initial therapies are insufficient, we have our tier 2 therapies that can more aggressively work to improve your symptoms and get you back to the activities that you enjoy.      Over the Counter Inserts    Super Feet are the most common and easiest to find.    Locations include any [employer] Store, [employer] in [place] on [address]  and in [place] on [address], Mailgun in [place] on [address], [facility] Running Room in [place] on [address], [facility] Running Room in [place] on [address], Phase Eight in [place] on [address] and [employer] in [place] on [address] and in [place] on [address].    Spenco can be found online and at [email or web address] Shoe Shop in [place] on [address], Run N' Fun in [facility] on [address], Gear Running Store in [place] on [address], Mailgun in [place] on [address] and [employer] Sports in [place] on [address].    Power Step can be a little harder to find.  Locations include [employer] in [place] on [address], [place] in [place], Stop-over Store in [place] on [address] and online    Walk-Fit - [employer]     [facility]    **  A good high quality over the counter insert can " cost around $40-$50.

## 2017-01-23 NOTE — PROGRESS NOTES
"Foot & Ankle Surgery  January 23, 2017    CC: bilateral arch pain    HPI:  Pt is a 21 year old female who presents with above complaint.  Bilateral arch pain x 1 year, describes deep ache and tingling pain.  Pain more pronounced with prolonged standing/ambulating.  Pain 9 1/2 at worst, she has used a \"automatic foot massager\" for treatment so far.  Pain levels minimal today as it's her day off work.      ROS:   Pos for CC.  The patient denies current nausea, vomiting, chills, fevers, belly pain, calf pain, chest pain or SOB.  Complete remainder of ROS is otherwise neg.    VITALS:    Filed Vitals:    01/23/17 1049   BP: 118/70   Height: 5' 10\" (1.778 m)   Weight: 324 lb (146.965 kg)       PMH:    Past Medical History   Diagnosis Date     NO ACTIVE PROBLEMS      Obese      Hypertension      Menorrhagia        SXHX:    Past Surgical History   Procedure Laterality Date     No history of surgery       Dilation and curettage, hysteroscopy diagnostic, combined N/A 6/23/2015     Procedure: COMBINED DILATION AND CURETTAGE, HYSTEROSCOPY DIAGNOSTIC;  Surgeon: Soraida Rosado MD;  Location:  OR        MEDS:    Current Outpatient Prescriptions   Medication     psyllium 0.52 G capsule     hydrocortisone 2.5 % cream     sertraline (ZOLOFT) 100 MG tablet     metFORMIN (GLUCOPHAGE) 500 MG tablet     medroxyPROGESTERone (DEPO-PROVERA) 150 MG/ML injection     betamethasone dipropionate (DIPROSONE) 0.05 % cream     No current facility-administered medications for this visit.       ALL:     Allergies   Allergen Reactions     Amoxicillin Anaphylaxis     Cefzil [Cefprozil] Hives     Sulfa Drugs Other (See Comments)     n/v       FMH:    Family History   Problem Relation Age of Onset     Respiratory Maternal Grandmother      emphasema     Hypertension Maternal Grandmother      Other Cancer Maternal Grandmother      lung cancer     Cancer - colorectal Maternal Grandfather      Hypertension Maternal Grandfather      Hypertension " Mother      Hypertension Maternal Uncle        SocHx:    Social History     Social History     Marital Status: Single     Spouse Name: N/A     Number of Children: N/A     Years of Education: N/A     Occupational History     Not on file.     Social History Main Topics     Smoking status: Never Smoker      Smokeless tobacco: Never Used      Comment: father smokes, lives in [place]     Alcohol Use: No     Drug Use: No     Sexual Activity: No     Other Topics Concern     Not on file     Social History Narrative    moved from [place], [place] 2003; lives with mom and 2 sisters    flys, with mom, back to [place] to visit father 1-3x/month.  Doesn't like flying.           EXAMINATION:  Gen:   No apparent distress  Neuro:   A&Ox3, no deficits  Psych:    Answering questions appropriately for age and situation with normal affect  Head:    NCAT  Eye:    Visual scanning without deficit  Ear:    Response to auditory stimuli wnl  Lung:    Non-labored breathing on RA noted  Abd:    NTND per patient report  Lymph:    Neg for pitting/non-pitting edema BLE  Vasc:    Pulses palpable, CFT minimally delayed  Neuro:    Light touch sensation intact to all sensory nerve distributions without paresthesias  Derm:    Neg for nodules, lesions or ulcerations  MSK:    R arch/heel shows no pain today, L foot shows pain along central band of PF but no heel pain.  Equinus bilateral lower extremity   Calf:    Neg for redness, swelling or tenderness    Assessment:  21 year old female with bilateral arch pain and equinus      Plan:  Discussed etiologies and options  1.  Arch pain bilateral   -Regarding the plantar fasciitis, the Plantar Fasciitis handout was dispensed and discussed.  We talked about stretching, resting, icing, NSAID use as tolerated, inserts, supportive shoes and minimizing barefoot walking.    -discussed and demonstrated Achilles, plantar fascial and hamstring stretches  -OTC insert and PRICE instructions dispensed and discussed     2.   Equinus  -stretching exercises discussed, information dispensed  -insert to act as heel lift to offload plantar fascia    Follow up:  4 weeks      Patient's medical history was reviewed today    Body mass index is 46.49 kg/(m^2).    Weight management plan: Patient was referred to their PCP to discuss a diet and exercise plan.        Daniel Jaime DPM   Podiatric Foot & Ankle Surgeon  [facility]  [phone]

## 2017-04-07 ENCOUNTER — ALLIED HEALTH/NURSE VISIT (OUTPATIENT)
Dept: NURSING | Facility: CLINIC | Age: 22
End: 2017-04-07
Payer: COMMERCIAL

## 2017-04-07 VITALS — DIASTOLIC BLOOD PRESSURE: 78 MMHG | HEART RATE: 90 BPM | SYSTOLIC BLOOD PRESSURE: 128 MMHG

## 2017-04-07 DIAGNOSIS — Z30.42 ENCOUNTER FOR DEPO-PROVERA CONTRACEPTION: Primary | ICD-10-CM

## 2017-04-07 PROCEDURE — 96372 THER/PROPH/DIAG INJ SC/IM: CPT

## 2017-04-07 PROCEDURE — 99207 ZZC NO CHARGE NURSE ONLY: CPT

## 2017-04-07 NOTE — NURSING NOTE
BLOOD PRESSURE: 128/78    DATE OF LAST PAP or ANNUAL EXAM: No results found for: PAP  URINE HCG:not indicated    The following medication was given:     MEDICATION: Depo Provera 150mg  ROUTE: IM  SITE: RUQ - Gluteus  : Cirqle  LOT #: [id number]  EXPIRATION:11/2019  NEXT INJECTION DUE: June 23-July 7 2017  Provider: Dr.Nowak Claudia Mcconnell, [facility] [address]

## 2017-04-07 NOTE — MR AVS SNAPSHOT
"              After Visit Summary   4/7/2017    Susy Huerta    MRN: [id number]           Patient Information     Date Of Birth          1995        Visit Information        Provider Department      4/7/2017 4:00 PM EA NURSE AB [facility]        Today's Diagnoses     Encounter for Depo-Provera contraception    -  1       Follow-ups after your visit        Your next 10 appointments already scheduled     Jun 23, 2017  4:00 PM CDT   Nurse Only with AISHWARYA NURSE AB   [facility] ([facility])    [address]  [address]  [place] [address] [phone]              Who to contact     If you have questions or need follow up information about today's clinic visit or your schedule please contact [facility] directly at [phone].  Normal or non-critical lab and imaging results will be communicated to you by MyChart, letter or phone within 4 business days after the clinic has received the results. If you do not hear from us within 7 days, please contact the clinic through MyChart or phone. If you have a critical or abnormal lab result, we will notify you by phone as soon as possible.  Submit refill requests through motify or call your pharmacy and they will forward the refill request to us. Please allow 3 business days for your refill to be completed.          Additional Information About Your Visit        MyChart Information     motify lets you send messages to your doctor, view your test results, renew your prescriptions, schedule appointments and more. To sign up, go to [email or web address] . Click on \"Log in\" on the left side of the screen, which will take you to the Welcome page. Then click on \"Sign up Now\" on the right side of the page.     You will be asked to enter the access code listed below, as well as some personal information. Please follow the directions to create your username and password.     Your access code is: " [email or web address]  Expires: 2017  4:15 PM     Your access code will  in 90 days. If you need help or a new code, please call your [facility] clinic or [phone].        Care EveryWhere ID     This is your Care EveryWhere ID. This could be used by other organizations to access your [facility] medical records  [id number]        Your Vitals Were     Pulse                   90            Blood Pressure from Last 3 Encounters:   17 128/78   17 118/70   17 136/60    Weight from Last 3 Encounters:   17 (!) 324 lb (147 kg) (>99 %)*   12/15/16 (!) 324 lb 9.6 oz (147.2 kg) (>99 %)*   16 (!) 340 lb (154.2 kg) (>99 %)*     * Growth percentiles are based on [facility] 2-20 Years data.              We Performed the Following     INJECTION INTRAMUSCULAR OR SUB-Q     Medroxyprogesterone inj  1mg   (Depo Provera J-Code)        Primary Care Provider Office Phone # Fax #    Yohan Beltran [phone]       [facility] [address] [place] [address]        Thank you!     Thank you for choosing [facility]  for your care. Our goal is always to provide you with excellent care. Hearing back from our patients is one way we can continue to improve our services. Please take a few minutes to complete the written survey that you may receive in the mail after your visit with us. Thank you!             Your Updated Medication List - Protect others around you: Learn how to safely use, store and throw away your medicines at www.disposemymeds.org.          This list is accurate as of: 17  4:15 PM.  Always use your most recent med list.                   Brand Name Dispense Instructions for use    betamethasone dipropionate 0.05 % cream    DIPROSONE    45 g    Apply sparingly to affected area twice daily as needed.  Do not apply to face.       hydrocortisone 2.5 % cream     20 g    Apply topically 2 times daily Can sub form if needed and size as needed       medroxyPROGESTERone  150 MG/ML injection    DEPO-PROVERA    3 mL    Inject 1 mL (150 mg) into the muscle every 3 months       metFORMIN 500 MG tablet    GLUCOPHAGE    60 tablet    Take 1 tablet (500 mg) by mouth 2 times daily (with meals)       psyllium 0.52 G capsule     20 capsule    Take 1 capsule (0.52 g) by mouth daily       sertraline 100 MG tablet    ZOLOFT    90 tablet    Take 1 tablet (100 mg) by mouth daily

## 2017-05-23 DIAGNOSIS — R73.03 PRE-DIABETES: ICD-10-CM

## 2017-05-23 DIAGNOSIS — F43.23 ADJUSTMENT DISORDER WITH MIXED ANXIETY AND DEPRESSED MOOD: ICD-10-CM

## 2017-05-24 NOTE — TELEPHONE ENCOUNTER
sertraline (ZOLOFT) 100 MG tablet     Last Written Prescription Date: 09/30/2016  Last Fill Quantity: 90, # refills: 0  Last Office Visit with [facility] primary care provider:  12/15/2016   Next 5 appointments (look out 90 days)     Jun 23, 2017  4:00 PM CDT   Nurse Only with EA NURSE AB   [facility]    [address] [place] [address] [phone]                   Last PHQ-9 score on record=   PHQ-9 SCORE 7/27/2016   Total Score -   Total Score 1

## 2017-05-25 NOTE — TELEPHONE ENCOUNTER
metFORMIN (GLUCOPHAGE) 500 MG tablet         Last Written Prescription Date: 8/16/2016  Last Fill Quantity: 60, # refills: 3  Last Office Visit with [facility], [facility] or  Health prescribing provider:  12/15/2016   Next 5 appointments (look out 90 days)     Jun 23, 2017  4:00 PM CDT   Nurse Only with EA NURSE AB   [facility]    [address] [place] [address]  [address]  [facility] [address] [phone]                   BP Readings from Last 3 Encounters:   04/07/17 128/78   01/23/17 118/70   01/20/17 136/60     No results found for: MICROL  No results found for: UMALCR  Creatinine   Date Value Ref Range Status   07/27/2016 0.62 0.50 - 1.00 mg/dL Final   ]  GFR Estimate   Date Value Ref Range Status   07/27/2016 >90  Non  GFR Calc   >60 mL/min/1.7m2 Final   04/03/2015 >90  Non  GFR Calc   >60 mL/min/1.7m2 Final     GFR Estimate If Black   Date Value Ref Range Status   07/27/2016 >90   GFR Calc   >60 mL/min/1.7m2 Final   04/03/2015 >90   GFR Calc   >60 mL/min/1.7m2 Final     Lab Results   Component Value Date    CHOL 179 07/27/2016     Lab Results   Component Value Date    HDL 43 07/27/2016     Lab Results   Component Value Date     07/27/2016     Lab Results   Component Value Date    TRIG 91 07/27/2016     No results found for: CHOLHDLRATIO  Lab Results   Component Value Date    AST 9 07/27/2016     Lab Results   Component Value Date    ALT 28 07/27/2016     Lab Results   Component Value Date    A1C 6.1 07/27/2016    A1C 5.7 04/03/2015     Potassium   Date Value Ref Range Status   07/27/2016 4.4 3.4 - 5.3 mmol/L Final

## 2017-05-26 RX ORDER — SERTRALINE HYDROCHLORIDE 100 MG/1
100 TABLET, FILM COATED ORAL DAILY
Qty: 90 TABLET | Refills: 0 | Status: SHIPPED | OUTPATIENT
Start: 2017-05-26 | End: 2017-07-19

## 2017-05-26 NOTE — TELEPHONE ENCOUNTER
Called and informed pt of message. Pt will call back and schedule appointment.  MAYTE Muñiz May 26, 2017 2:09 PM

## 2017-05-26 NOTE — TELEPHONE ENCOUNTER
Medication is being filled for 1 time refill only due to:  due for 6 month diabetes check and labs.    Please call patient to help her schedule an appointment.  Luisana Holt, RN  Triage Nurse

## 2017-05-26 NOTE — TELEPHONE ENCOUNTER
Called pt and informed of message. Pt will call back and schedule appointment.  MAYTE Muñiz May 26, 2017 2:10 PM

## 2017-05-26 NOTE — TELEPHONE ENCOUNTER
Medication is being filled for 1 time refill only due to:  due for an appt for diabetes check.   Please call patient to help her schedule this.   Luisana Holt, RN  Triage Nurse

## 2017-06-27 ENCOUNTER — ALLIED HEALTH/NURSE VISIT (OUTPATIENT)
Dept: NURSING | Facility: CLINIC | Age: 22
End: 2017-06-27
Payer: COMMERCIAL

## 2017-06-27 VITALS — DIASTOLIC BLOOD PRESSURE: 70 MMHG | HEART RATE: 105 BPM | SYSTOLIC BLOOD PRESSURE: 110 MMHG

## 2017-06-27 PROCEDURE — 99207 ZZC NO CHARGE NURSE ONLY: CPT

## 2017-06-27 PROCEDURE — 96372 THER/PROPH/DIAG INJ SC/IM: CPT

## 2017-06-27 NOTE — PROGRESS NOTES
BLOOD PRESSURE: 110/70    DATE OF LAST PAP or ANNUAL EXAM: No results found for: PAP  URINE HCG:not indicated    The following medication was given:     MEDICATION: Depo Provera 150mg  ROUTE: IM  SITE: Ventrogluteal - Left  : [place]  LOT #: [id number]  EXPIRATION:[address]-6  NDC:[address]-6  NEXT INJECTION DUE: 9/12-9/26  Provider: Devin   //Mercedez Green MA// June 27, 2017 4:48 PM

## 2017-06-27 NOTE — MR AVS SNAPSHOT
"              After Visit Summary   6/27/2017    Susy Huerta    MRN: [id number]           Patient Information     Date Of Birth          1995        Visit Information        Provider Department      6/27/2017 3:30 PM AISHWARYA NURSE AB [facility]        Today's Diagnoses     Contraception    -  1       Follow-ups after your visit        Your next 10 appointments already scheduled     Sep 12, 2017  3:30 PM CDT   Nurse Only with EA NURSE AB   [facility] [place] ([facility])    [address]  [address]  [place] [address] [phone]              Who to contact     If you have questions or need follow up information about today's clinic visit or your schedule please contact [facility] directly at [phone].  Normal or non-critical lab and imaging results will be communicated to you by Monkey Analyticshart, letter or phone within 4 business days after the clinic has received the results. If you do not hear from us within 7 days, please contact the clinic through MyChart or phone. If you have a critical or abnormal lab result, we will notify you by phone as soon as possible.  Submit refill requests through Petrabytes or call your pharmacy and they will forward the refill request to us. Please allow 3 business days for your refill to be completed.          Additional Information About Your Visit        MyCharSingulex Information     Petrabytes lets you send messages to your doctor, view your test results, renew your prescriptions, schedule appointments and more. To sign up, go to [email or web address] . Click on \"Log in\" on the left side of the screen, which will take you to the Welcome page. Then click on \"Sign up Now\" on the right side of the page.     You will be asked to enter the access code listed below, as well as some personal information. Please follow the directions to create your username and password.     Your access code is: [email or web address]  Expires: 7/6/2017  4:15 PM   "   Your access code will  in 90 days. If you need help or a new code, please call your [place] clinic or [phone].        Care EveryWhere ID     This is your Care EveryWhere ID. This could be used by other organizations to access your [place] medical records  [id number]        Your Vitals Were     Pulse                   105            Blood Pressure from Last 3 Encounters:   17 110/70   17 128/78   17 118/70    Weight from Last 3 Encounters:   17 (!) 324 lb (147 kg) (>99 %)*   12/15/16 (!) 324 lb 9.6 oz (147.2 kg) (>99 %)*   16 (!) 340 lb (154.2 kg) (>99 %)*     * Growth percentiles are based on [facility] 2-20 Years data.              We Performed the Following     INJECTION INTRAMUSCULAR OR SUB-Q     Medroxyprogesterone inj  1mg   (Depo Provera J-Code)        Primary Care Provider Office Phone # Fax #    Yohan Beltran [phone]       [facility] [address] [place] [address]        Equal Access to Services     [facility] AH: Hadii aad ku hadasho Soomaali, waaxda luqadaha, qaybta kaalmada adeegyada, kaushal pleitez . So [facility] [phone].    ATENCIÓN: Si habla español, tiene a joseph disposición servicios gratuitos de asistencia lingüística. Llame al [phone].    We comply with applicable federal civil rights laws and [place] laws. We do not discriminate on the basis of race, color, national origin, age, disability sex, sexual orientation or gender identity.            Thank you!     Thank you for choosing [facility]  for your care. Our goal is always to provide you with excellent care. Hearing back from our patients is one way we can continue to improve our services. Please take a few minutes to complete the written survey that you may receive in the mail after your visit with us. Thank you!             Your Updated Medication List - Protect others around you: Learn how to safely use, store and  throw away your medicines at www.disposemymeds.org.          This list is accurate as of: 6/27/17  4:51 PM.  Always use your most recent med list.                   Brand Name Dispense Instructions for use Diagnosis    betamethasone dipropionate 0.05 % cream    DIPROSONE    45 g    Apply sparingly to affected area twice daily as needed.  Do not apply to face.    Eczema, unspecified type       hydrocortisone 2.5 % cream     20 g    Apply topically 2 times daily Can sub form if needed and size as needed    Eczema, unspecified type       medroxyPROGESTERone 150 MG/ML injection    DEPO-PROVERA    3 mL    Inject 1 mL (150 mg) into the muscle every 3 months    Encounter for surveillance of injectable contraceptive, Excessive or frequent menstruation       metFORMIN 500 MG tablet    GLUCOPHAGE    60 tablet    TAKE ONE TABLET BY MOUTH EVERY DAY WITH MEALS. CAN INCREASE TO TWO TIMES A DAY IF TOLERATING WELL.    Pre-diabetes       psyllium 0.52 G capsule     20 capsule    Take 1 capsule (0.52 g) by mouth daily        sertraline 100 MG tablet    ZOLOFT    90 tablet    Take 1 tablet (100 mg) by mouth daily    Adjustment disorder with mixed anxiety and depressed mood

## 2017-07-19 DIAGNOSIS — F43.23 ADJUSTMENT DISORDER WITH MIXED ANXIETY AND DEPRESSED MOOD: ICD-10-CM

## 2017-07-19 DIAGNOSIS — R73.03 PRE-DIABETES: ICD-10-CM

## 2017-07-20 NOTE — TELEPHONE ENCOUNTER
sertraline (ZOLOFT) 100 MG tablet     Last Written Prescription Date: 5/26/2017  Last Fill Quantity: 90, # refills: 0  Last Office Visit with [facility] primary care provider:  12/15/2016   Next 5 appointments (look out 90 days)     Sep 12, 2017  3:30 PM CDT   Nurse Only with EA NURSE AB   [facility] ([facility]    [address] [place] [address] [phone]                   Last PHQ-9 score on record=   PHQ-9 SCORE 7/27/2016   Total Score -   Total Score 1

## 2017-07-20 NOTE — TELEPHONE ENCOUNTER
metFORMIN (GLUCOPHAGE) 500 MG tablet         Last Written Prescription Date: 5/26/2017  Last Fill Quantity: 60, # refills: 0  Last Office Visit with [facility], P or  Health prescribing provider:  12/15/2016   Next 5 appointments (look out 90 days)     Sep 12, 2017  3:30 PM CDT   Nurse Only with EA NURSE AB   [facility] [place] ([facility] [place]    [address] [place] [address]  [address]  [facility] [address] [phone]                   BP Readings from Last 3 Encounters:   06/27/17 110/70   04/07/17 128/78   01/23/17 118/70     No results found for: MICROL  No results found for: UMALCR  Creatinine   Date Value Ref Range Status   07/27/2016 0.62 0.50 - 1.00 mg/dL Final   ]  GFR Estimate   Date Value Ref Range Status   07/27/2016 >90  Non  GFR Calc   >60 mL/min/1.7m2 Final   04/03/2015 >90  Non  GFR Calc   >60 mL/min/1.7m2 Final     GFR Estimate If Black   Date Value Ref Range Status   07/27/2016 >90   GFR Calc   >60 mL/min/1.7m2 Final   04/03/2015 >90   GFR Calc   >60 mL/min/1.7m2 Final     Lab Results   Component Value Date    CHOL 179 07/27/2016     Lab Results   Component Value Date    HDL 43 07/27/2016     Lab Results   Component Value Date     07/27/2016     Lab Results   Component Value Date    TRIG 91 07/27/2016     No results found for: CHOLHDLRATIO  Lab Results   Component Value Date    AST 9 07/27/2016     Lab Results   Component Value Date    ALT 28 07/27/2016     Lab Results   Component Value Date    A1C 6.1 07/27/2016    A1C 5.7 04/03/2015     Potassium   Date Value Ref Range Status   07/27/2016 4.4 3.4 - 5.3 mmol/L Final

## 2017-07-21 NOTE — TELEPHONE ENCOUNTER
Routing to provider - patient was given refill x1 5/2017 and asked to schedule appointment. Nothing scheduled to date.    Jess Mac, MSN, RN-BC  Care Coordinator

## 2017-07-24 RX ORDER — SERTRALINE HYDROCHLORIDE 100 MG/1
100 TABLET, FILM COATED ORAL DAILY
Qty: 30 TABLET | Refills: 0 | Status: SHIPPED | OUTPATIENT
Start: 2017-07-24 | End: 2017-09-08

## 2017-08-25 ENCOUNTER — TELEPHONE (OUTPATIENT)
Dept: PEDIATRICS | Facility: CLINIC | Age: 22
End: 2017-08-25

## 2017-08-25 NOTE — TELEPHONE ENCOUNTER
Panel Management Review      Patient has the following on her problem list: None      Composite cancer screening  Chart review shows that this patient is due/due soon for the following chlamydia  testig  Summary:    Patient is due/failing the following:   Pap and std testing    Action needed:   Patient needs office visit for lab appt.    Type of outreach:    unable to leave message. Pt VM box full    Questions for provider review:    None                                                                                                                                    Kassidy MARTINES, FEDE,AAMA       Chart routed to Care Team .

## 2017-08-30 ENCOUNTER — TELEPHONE (OUTPATIENT)
Dept: PEDIATRICS | Facility: CLINIC | Age: 22
End: 2017-08-30

## 2017-08-30 DIAGNOSIS — Z00.00 HEALTH CARE MAINTENANCE: Primary | ICD-10-CM

## 2017-08-30 NOTE — TELEPHONE ENCOUNTER
Pt has a lab appt this Friday.  Can you please order labs you would like for metformin, etc.   She is coming in to see you for a physical then next week?  Thanks!

## 2017-09-08 ENCOUNTER — OFFICE VISIT (OUTPATIENT)
Dept: PEDIATRICS | Facility: CLINIC | Age: 22
End: 2017-09-08
Payer: COMMERCIAL

## 2017-09-08 ENCOUNTER — RADIANT APPOINTMENT (OUTPATIENT)
Dept: GENERAL RADIOLOGY | Facility: CLINIC | Age: 22
End: 2017-09-08
Attending: NURSE PRACTITIONER
Payer: COMMERCIAL

## 2017-09-08 VITALS
DIASTOLIC BLOOD PRESSURE: 74 MMHG | WEIGHT: 293 LBS | BODY MASS INDEX: 41.95 KG/M2 | TEMPERATURE: 98.1 F | OXYGEN SATURATION: 99 % | HEIGHT: 70 IN | SYSTOLIC BLOOD PRESSURE: 114 MMHG | HEART RATE: 90 BPM

## 2017-09-08 DIAGNOSIS — M25.572 ACUTE LEFT ANKLE PAIN: ICD-10-CM

## 2017-09-08 DIAGNOSIS — F43.23 ADJUSTMENT DISORDER WITH MIXED ANXIETY AND DEPRESSED MOOD: ICD-10-CM

## 2017-09-08 DIAGNOSIS — Z30.42 ENCOUNTER FOR SURVEILLANCE OF INJECTABLE CONTRACEPTIVE: ICD-10-CM

## 2017-09-08 DIAGNOSIS — N92.0 EXCESSIVE OR FREQUENT MENSTRUATION: ICD-10-CM

## 2017-09-08 DIAGNOSIS — Z00.00 HEALTHCARE MAINTENANCE: Primary | ICD-10-CM

## 2017-09-08 DIAGNOSIS — R73.03 PRE-DIABETES: ICD-10-CM

## 2017-09-08 LAB
CHOLEST SERPL-MCNC: 141 MG/DL
HBA1C MFR BLD: 5.6 % (ref 4.3–6)
HDLC SERPL-MCNC: 53 MG/DL
LDLC SERPL CALC-MCNC: 66 MG/DL
NONHDLC SERPL-MCNC: 88 MG/DL
TRIGL SERPL-MCNC: 109 MG/DL
TSH SERPL DL<=0.005 MIU/L-ACNC: 2.38 MU/L (ref 0.4–4)

## 2017-09-08 PROCEDURE — 99395 PREV VISIT EST AGE 18-39: CPT | Performed by: NURSE PRACTITIONER

## 2017-09-08 PROCEDURE — 83036 HEMOGLOBIN GLYCOSYLATED A1C: CPT | Performed by: NURSE PRACTITIONER

## 2017-09-08 PROCEDURE — 80061 LIPID PANEL: CPT | Performed by: NURSE PRACTITIONER

## 2017-09-08 PROCEDURE — 73610 X-RAY EXAM OF ANKLE: CPT | Mod: LT

## 2017-09-08 PROCEDURE — 84443 ASSAY THYROID STIM HORMONE: CPT | Performed by: NURSE PRACTITIONER

## 2017-09-08 PROCEDURE — 36415 COLL VENOUS BLD VENIPUNCTURE: CPT | Performed by: NURSE PRACTITIONER

## 2017-09-08 PROCEDURE — 99214 OFFICE O/P EST MOD 30 MIN: CPT | Mod: 25 | Performed by: NURSE PRACTITIONER

## 2017-09-08 RX ORDER — SERTRALINE HYDROCHLORIDE 100 MG/1
200 TABLET, FILM COATED ORAL DAILY
Qty: 90 TABLET | Refills: 3 | Status: SHIPPED | OUTPATIENT
Start: 2017-09-08 | End: 2018-04-02

## 2017-09-08 RX ORDER — MEDROXYPROGESTERONE ACETATE 150 MG/ML
150 INJECTION, SUSPENSION INTRAMUSCULAR
Qty: 3 ML | Refills: 3 | Status: CANCELLED | OUTPATIENT
Start: 2017-09-08

## 2017-09-08 ASSESSMENT — ANXIETY QUESTIONNAIRES
7. FEELING AFRAID AS IF SOMETHING AWFUL MIGHT HAPPEN: NOT AT ALL
6. BECOMING EASILY ANNOYED OR IRRITABLE: SEVERAL DAYS
2. NOT BEING ABLE TO STOP OR CONTROL WORRYING: SEVERAL DAYS
IF YOU CHECKED OFF ANY PROBLEMS ON THIS QUESTIONNAIRE, HOW DIFFICULT HAVE THESE PROBLEMS MADE IT FOR YOU TO DO YOUR WORK, TAKE CARE OF THINGS AT HOME, OR GET ALONG WITH OTHER PEOPLE: NOT DIFFICULT AT ALL
1. FEELING NERVOUS, ANXIOUS, OR ON EDGE: NOT AT ALL
5. BEING SO RESTLESS THAT IT IS HARD TO SIT STILL: NOT AT ALL
GAD7 TOTAL SCORE: 2
3. WORRYING TOO MUCH ABOUT DIFFERENT THINGS: NOT AT ALL

## 2017-09-08 ASSESSMENT — PATIENT HEALTH QUESTIONNAIRE - PHQ9
5. POOR APPETITE OR OVEREATING: NOT AT ALL
SUM OF ALL RESPONSES TO PHQ QUESTIONS 1-9: 8

## 2017-09-08 NOTE — MR AVS SNAPSHOT
After Visit Summary   9/8/2017    Susy Huerta    MRN: [id number]           Patient Information     Date Of Birth          1995        Visit Information        Provider Department      9/8/2017 10:00 AM Monica Navarrete APRN [facility]        Today's Diagnoses     Healthcare maintenance    -  1    Encounter for surveillance of injectable contraceptive        Excessive or frequent menstruation        Adjustment disorder with mixed anxiety and depressed mood        Encounter for contraceptive management, unspecified type        Body mass index 45.0-49.9, adult (H)        Pre-diabetes        Acute left ankle pain          Care Instructions    -Zoloft 200  -See therapist Jonathan Ruelas  -See OB to discuss birth control  -Please see endocrinology about weight loss medications.  -Labs today    Preventive Health Recommendations  Female Ages 18 to 25     Yearly exam:     See your health care provider every year in order to  o Review health changes.   o Discuss preventive care.    o Review your medicines if your doctor has prescribed any.      You should be tested each year for STDs (sexually transmitted diseases).       After age 20, talk to your provider about how often you should have cholesterol testing.      Starting at age 21, get a Pap test every three years. If you have an abnormal result, your doctor may have you test more often.      If you are at risk for diabetes, you should have a diabetes test (fasting glucose).     Shots:     Get a flu shot each year.     Get a tetanus shot every 10 years.     Consider getting the shot (vaccine) that prevents cervical cancer (Gardasil).    Nutrition:     Eat at least 5 servings of fruits and vegetables each day.    Eat whole-grain bread, whole-wheat pasta and brown rice instead of white grains and rice.    Talk to your provider about Calcium and Vitamin D.     Lifestyle    Exercise at least 150 minutes a week each week (30 minutes a day,  5 days a week). This will help you control your weight and prevent disease.    Limit alcohol to one drink per day.    No smoking.     Wear sunscreen to prevent skin cancer.    See your dentist every six months for an exam and cleaning.          Follow-ups after your visit        Additional Services     ENDOCRINOLOGY ADULT REFERRAL       Your provider has referred you to: [facility]: [facility] [phone]   [email or web address]      Please be aware that coverage of these services is subject to the terms and limitations of your health insurance plan.  Call member services at your health plan with any benefit or coverage questions.      Please bring the following to your appointment:    >>   Any x-rays, CTs or MRIs which have been performed.  Contact the facility where they were done to arrange for  prior to your scheduled appointment.    >>   List of current medications   >>   This referral request   >>   Any documents/labs given to you for this referral            MENTAL HEALTH REFERRAL       Your provider has referred you to: [facility]: [facility] - Counseling (Individual/Couples/Family) - [facility] [phone]   [email or web address] [place] [facility]/   *Patient will be contacted by [facility] scheduling partner, [facility]), to schedule an appointment.  Patients may also call [facility] to schedule.    All scheduling is subject to the client's specific insurance plan & benefits, provider/location availability, and provider clinical specialities.  Please arrive 15 minutes early for your first appointment and bring your completed paperwork.    Please be aware that coverage of these services is subject to the terms and limitations of your health insurance plan.  Call member services at your health plan with any benefit or coverage questions.                  Your next 10  "appointments already scheduled     Sep 12, 2017  3:30 PM CDT   Nurse Only with EA NURSE AB   [facility] [place] ([facility])    [address]  [address]  [place] [address] [phone]            Sep 13, 2017  2:30 PM CDT   Office Visit with Josy Avilez MD   [facility] ([facility]    [address]  [facility] [address] [phone]           Bring a current list of meds and any records pertaining to this visit. For Physicals, please bring immunization records and any forms needing to be filled out. Please arrive 10 minutes early to complete paperwork.              Future tests that were ordered for you today     Open Future Orders        Priority Expected Expires Ordered    XR Ankle Left G/E 3 Views Routine 9/8/2017 9/8/2018 9/8/2017            Who to contact     If you have questions or need follow up information about today's clinic visit or your schedule please contact [facility] directly at [phone].  Normal or non-critical lab and imaging results will be communicated to you by Flipiturehart, letter or phone within 4 business days after the clinic has received the results. If you do not hear from us within 7 days, please contact the clinic through [employer] or phone. If you have a critical or abnormal lab result, we will notify you by phone as soon as possible.  Submit refill requests through TuManitas or call your pharmacy and they will forward the refill request to us. Please allow 3 business days for your refill to be completed.          Additional Information About Your Visit        TuManitas Information     TuManitas lets you send messages to your doctor, view your test results, renew your prescriptions, schedule appointments and more. To sign up, go to [email or web address] . Click on \"Log in\" on the left side of the screen, which will take you to the Welcome page. Then click on \"Sign up Now\" on the right side " "of the page.     You will be asked to enter the access code listed below, as well as some personal information. Please follow the directions to create your username and password.     Your access code is: [email or web address]  Expires: 2017 10:40 AM     Your access code will  in 90 days. If you need help or a new code, please call your [place] clinic or [phone].        Care EveryWhere ID     This is your Care EveryWhere ID. This could be used by other organizations to access your [place] medical records  [id number]        Your Vitals Were     Pulse Temperature Height Last Period Pulse Oximetry BMI (Body Mass Index)    90 98.1  F (36.7  C) (Tympanic) 5' 10\" (1.778 m) 08/10/2017 (Approximate) 99% 48.56 kg/m2       Blood Pressure from Last 3 Encounters:   17 114/74   17 110/70   17 128/78    Weight from Last 3 Encounters:   17 (!) 338 lb 6.4 oz (153.5 kg) (>99 %)*   17 (!) 324 lb (147 kg) (>99 %)*   12/15/16 (!) 324 lb 9.6 oz (147.2 kg) (>99 %)*     * Growth percentiles are based on [facility] 2-20 Years data.              We Performed the Following     ENDOCRINOLOGY ADULT REFERRAL     Hemoglobin A1c     Lipid panel reflex to direct LDL     MENTAL HEALTH REFERRAL     TSH with free T4 reflex          Today's Medication Changes          These changes are accurate as of: 17 10:40 AM.  If you have any questions, ask your nurse or doctor.               These medicines have changed or have updated prescriptions.        Dose/Directions    sertraline 100 MG tablet   Commonly known as:  ZOLOFT   This may have changed:  how much to take   Used for:  Adjustment disorder with mixed anxiety and depressed mood   Changed by:  Monica Navarrete APRN CNP        Dose:  200 mg   Take 2 tablets (200 mg) by mouth daily Office visit needed prior to additional refills.   Quantity:  90 tablet   Refills:  3            Where to get your medicines      These medications were sent to [facility] " [place] [facility] [place] [place] - [address]   [address], [place] [address]     Phone:  [phone]     sertraline 100 MG tablet                Primary Care Provider Office Phone # Fax #    Yohan Beltran [phone] [address] [place] [address]        Equal Access to Services     [facility]: Hadii aad ku hadasho Soomaali, waaxda luqadaha, qaybta kaalmada adeegyada, waxay idiin hayaan adeeg kharash la'aan ah. So [facility] [phone].    ATENCIÓN: Si bridgetla ren, tiene a joseph disposición servicios gratuitos de asistencia lingüística. Subhaame al [phone].    We comply with applicable federal civil rights laws and [place] laws. We do not discriminate on the basis of race, color, national origin, age, disability sex, sexual orientation or gender identity.            Thank you!     Thank you for choosing [facility]  for your care. Our goal is always to provide you with excellent care. Hearing back from our patients is one way we can continue to improve our services. Please take a few minutes to complete the written survey that you may receive in the mail after your visit with us. Thank you!             Your Updated Medication List - Protect others around you: Learn how to safely use, store and throw away your medicines at www.disposemymeds.org.          This list is accurate as of: 9/8/17 10:40 AM.  Always use your most recent med list.                   Brand Name Dispense Instructions for use Diagnosis    betamethasone dipropionate 0.05 % cream    DIPROSONE    45 g    Apply sparingly to affected area twice daily as needed.  Do not apply to face.    Eczema, unspecified type       hydrocortisone 2.5 % cream     20 g    Apply topically 2 times daily Can sub form if needed and size as needed    Eczema, unspecified type       medroxyPROGESTERone 150 MG/ML injection    DEPO-PROVERA    3 mL    Inject 1 mL (150 mg) into the muscle every 3  months    Encounter for surveillance of injectable contraceptive, Excessive or frequent menstruation       metFORMIN 500 MG tablet    GLUCOPHAGE    60 tablet    Take 1 tablet (500 mg) by mouth 2 times daily (with meals) Office visit needed prior to additional refills.    Pre-diabetes       sertraline 100 MG tablet    ZOLOFT    90 tablet    Take 2 tablets (200 mg) by mouth daily Office visit needed prior to additional refills.    Adjustment disorder with mixed anxiety and depressed mood

## 2017-09-08 NOTE — PROGRESS NOTES
"   SUBJECTIVE:   CC: Susy Huerta is an 21 year old woman who presents for preventive health visit.     Physical   Annual:     Getting at least 3 servings of Calcium per day::  Yes    Bi-annual eye exam::  Yes    Dental care twice a year::  Yes    Sleep apnea or symptoms of sleep apnea::  None    Diet::  Regular (no restrictions)    Frequency of exercise::  2-3 days/week    Duration of exercise::  45-60 minutes    Taking medications regularly::  Yes    Medication side effects::  None    Additional concerns today::  No    Renew depoprovera prescription today - recently had 3 week menses    -------------------------------------  Morbid obesity for quite some time now. Depo has worsened the issue. Has tried to start exercising more. Not interested in bariatric surgery. Hasn't tried counting calories.    History of endometrial hyperplasia. Had to do a D & C then was put on depo. Hasn't seen OB since. Has appt next week. Depo has controlled the heaviness of her periods but still gets a period. Recently has had a period for 3 weeks, waxing and waning heavy flow/light flow. No pelvic pain or symptoms of anemia.    Depression and anxiety: Feels like it is much better but \"it's not all rainbows and sunshine up there.\" She self doubled her meds. Previously a counselor told her she needed to socialize at lunch instead of reading and she didn't like the therapist. Is open to seeing a therapist again. No SIB.     Today's PHQ-2 Score:   PHQ-2 ( 1999 Pfizer) 9/8/2017   Q1: Little interest or pleasure in doing things 0   Q2: Feeling down, depressed or hopeless 1   PHQ-2 Score 1   Q1: Little interest or pleasure in doing things Not at all   Q2: Feeling down, depressed or hopeless Several days   PHQ-2 Score 1       Abuse: Current or Past(Physical, Sexual or Emotional)- No  Do you feel safe in your environment - Yes    Social History   Substance Use Topics     Smoking status: Never Smoker     Smokeless tobacco: Never Used      " "Comment: father smokes, lives in [place]     Alcohol use No     The patient does not drink >3 drinks per day nor >7 drinks per week.    Reviewed orders with patient.  Reviewed health maintenance and updated orders accordingly - Yes  Labs reviewed in EPIC    Mammogram not appropriate for this patient based on age.    Pertinent mammograms are reviewed under the imaging tab.  History of abnormal Pap smear: NO - under age 21, PAP not appropriate for age    Reviewed and updated as needed this visit by clinical staff  Tobacco  Allergies  Meds  Med Hx  Surg Hx  Fam Hx  Soc Hx        Reviewed and updated as needed this visit by Provider              ROS:  C: NEGATIVE for fever, chills, change in weight  I: NEGATIVE for worrisome rashes, moles or lesions  E: NEGATIVE for vision changes or irritation  ENT: NEGATIVE for ear, mouth and throat problems  R: NEGATIVE for significant cough or SOB  B: NEGATIVE for masses, tenderness or discharge  CV: NEGATIVE for chest pain, palpitations or peripheral edema  GI: NEGATIVE for nausea, abdominal pain, heartburn, or change in bowel habits  : NEGATIVE for unusual urinary or vaginal symptoms. Periods as above.   M: NEGATIVE for significant arthralgias or myalgia  N: NEGATIVE for weakness, dizziness or paresthesias  P: NEGATIVE for changes in mood or affect. As above.      OBJECTIVE:   /74 (Cuff Size: Adult Large)  Pulse 90  Temp 98.1  F (36.7  C) (Tympanic)  Ht 5' 10\" (1.778 m)  Wt (!) 338 lb 6.4 oz (153.5 kg)  LMP 08/10/2017 (Approximate)  SpO2 99%  BMI 48.56 kg/m2  EXAM:  GENERAL: healthy, alert and no distress  EYES: Eyes grossly normal to inspection, PERRL and conjunctivae and sclerae normal  HENT: ear canals and TM's normal, nose and mouth without ulcers or lesions  NECK: no adenopathy, no asymmetry, masses, or scars and thyroid normal to palpation  RESP: lungs clear to auscultation - no rales, rhonchi or wheezes  CV: regular rate and rhythm, normal S1 S2, no S3 " or S4, no murmur, click or rub, no peripheral edema and peripheral pulses strong  ABDOMEN: soft, nontender, no hepatosplenomegaly, no masses and bowel sounds normal  MS: No swelling, bruising, or deformity of left ankle. TTP navicular region. No other bony tenderness. Full ROM.   SKIN: no suspicious lesions or rashes  NEURO: Normal strength and tone, mentation intact and speech normal  PSYCH: mentation appears normal, affect normal/bright    ASSESSMENT/PLAN:   1. Healthcare maintenance  - Lipid panel reflex to direct LDL  - Hemoglobin A1c  - TSH with free T4 reflex    2. Body mass index 45.0-49.9, adult (H)  As above. Depression, depo-provera and H obesity likely contributing. Not interested in bariatric surgery at this time.   - ENDOCRINOLOGY ADULT REFERRAL-referred for weight loss medications.   - Lipid panel reflex to direct LDL  - Hemoglobin A1c  - TSH with free T4 reflex  -Hold on depo until talking to OB about other options.     3. Pre-diabetes  - ENDOCRINOLOGY ADULT REFERRAL  - Hemoglobin A1c  -Metformin refilled.     4. Adjustment disorder with mixed anxiety and depressed mood  Fair control.   - sertraline (ZOLOFT) 100 MG tablet; Take 2 tablets (200 mg) by mouth daily Office visit needed prior to additional refills.  Dispense: 90 tablet; Refill: 3. Ok to increase to 200.   - MENTAL HEALTH REFERRAL-Strongly encouraged therapy  -Encouraged exercise  -F/U 3 months.     5. Encounter for surveillance of injectable contraceptive  Previous history of endometrial dysplasia s/p D & C then on depo since. Depo has caused weight gain. Has f/u with OB next week. Strong [facility] DVT and PE (no history of clotting disorders but unsure if they have been tested) so would like to stay away from estrogen containing methods.   -Advised her to hold on depo until talking with OB about other options next week.   -Currently not menstruating so no urgent need to re-start depo. Depo due 9/12 but ok to wait until 9/13 if needed.     6.  "Excessive or frequent menstruation  As above.     7. Acute left ankle pain  Patient inverted ankle a few days ago. Having some pain on the lateral ankle. No evidence of fracture on XR. Likely mild sprain.  -Ace wrap  -Elevate/ice  -Nsaids/Tylenol  -Discussed supportive cares and reasons to return. Discussed reasons to seek care urgently.     - XR Ankle Left G/E 3 Views; Future    COUNSELING:  Reviewed preventive health counseling, as reflected in patient instructions       reports that she has never smoked. She has never used smokeless tobacco.    Estimated body mass index is 48.56 kg/(m^2) as calculated from the following:    Height as of this encounter: 5' 10\" (1.778 m).    Weight as of this encounter: 338 lb 6.4 oz (153.5 kg).   Weight management plan: as above    Counseling Resources:  ATP IV Guidelines  Pooled Cohorts Equation Calculator  Breast Cancer Risk Calculator  FRAX Risk Assessment  ICSI Preventive Guidelines  Dietary Guidelines for Americans, 2010  USDA's MyPlate  ASA Prophylaxis  Lung CA Screening    [facility]  "

## 2017-09-08 NOTE — PATIENT INSTRUCTIONS
-Zoloft 200  -See therapist Jonathan Ruelas  -See OB to discuss birth control  -Please see endocrinology about weight loss medications.  -Labs today    Preventive Health Recommendations  Female Ages 18 to 25     Yearly exam:     See your health care provider every year in order to  o Review health changes.   o Discuss preventive care.    o Review your medicines if your doctor has prescribed any.      You should be tested each year for STDs (sexually transmitted diseases).       After age 20, talk to your provider about how often you should have cholesterol testing.      Starting at age 21, get a Pap test every three years. If you have an abnormal result, your doctor may have you test more often.      If you are at risk for diabetes, you should have a diabetes test (fasting glucose).     Shots:     Get a flu shot each year.     Get a tetanus shot every 10 years.     Consider getting the shot (vaccine) that prevents cervical cancer (Gardasil).    Nutrition:     Eat at least 5 servings of fruits and vegetables each day.    Eat whole-grain bread, whole-wheat pasta and brown rice instead of white grains and rice.    Talk to your provider about Calcium and Vitamin D.     Lifestyle    Exercise at least 150 minutes a week each week (30 minutes a day, 5 days a week). This will help you control your weight and prevent disease.    Limit alcohol to one drink per day.    No smoking.     Wear sunscreen to prevent skin cancer.    See your dentist every six months for an exam and cleaning.

## 2017-09-08 NOTE — NURSING NOTE
"Chief Complaint   Patient presents with     Physical       Initial /74 (Cuff Size: Adult Large)  Pulse 90  Temp 98.1  F (36.7  C) (Tympanic)  Ht 5' 10\" (1.778 m)  Wt (!) 338 lb 6.4 oz (153.5 kg)  LMP 08/10/2017 (Approximate)  SpO2 99%  BMI 48.56 kg/m2 Estimated body mass index is 48.56 kg/(m^2) as calculated from the following:    Height as of this encounter: 5' 10\" (1.778 m).    Weight as of this encounter: 338 lb 6.4 oz (153.5 kg).  Medication Reconciliation: complete   Mary Hylton CMA    "

## 2017-09-09 ASSESSMENT — ANXIETY QUESTIONNAIRES: GAD7 TOTAL SCORE: 2

## 2017-09-18 ENCOUNTER — ALLIED HEALTH/NURSE VISIT (OUTPATIENT)
Dept: NURSING | Facility: CLINIC | Age: 22
End: 2017-09-18
Payer: COMMERCIAL

## 2017-09-18 VITALS — SYSTOLIC BLOOD PRESSURE: 126 MMHG | HEART RATE: 114 BPM | DIASTOLIC BLOOD PRESSURE: 88 MMHG

## 2017-09-18 DIAGNOSIS — Z30.42 ENCOUNTER FOR SURVEILLANCE OF INJECTABLE CONTRACEPTIVE: Primary | ICD-10-CM

## 2017-09-18 PROCEDURE — 96372 THER/PROPH/DIAG INJ SC/IM: CPT

## 2017-09-18 PROCEDURE — 99207 ZZC NO CHARGE NURSE ONLY: CPT

## 2017-09-18 NOTE — PROGRESS NOTES
BP: 126/88    LAST PAP/EXAM: No results found for: PAP  URINE HCG:not indicated    The following medication was given:     MEDICATION: Depo Provera 150mg  ROUTE: IM  SITE: Ventrogluteal - Right  : [place]  LOT #: [id number]  EXP: 2-1-2020  NEXT INJECTION DUE: 12/4/17 - 12/18/17   Provider: Yohan Beltran MD

## 2017-09-18 NOTE — MR AVS SNAPSHOT
After Visit Summary   9/18/2017    Susy Huerta    MRN: [id number]           Patient Information     Date Of Birth          1995        Visit Information        Provider Department      9/18/2017 3:30 PM EA NURSE AB [facility]        Today's Diagnoses     Encounter for surveillance of injectable contraceptive    -  1       Follow-ups after your visit        Your next 10 appointments already scheduled     Sep 19, 2017 10:00 AM CDT   New Visit with Syeda Nadersanjay Andres   [facility] ([facility]    [address]  [place] [address]   [phone]            Sep 26, 2017  2:00 PM CDT   [facility] with EDDI [facility] C[facility]    [address]  [facility] [address] [phone]            Oct 31, 2017  2:30 PM CDT   Office Visit with Josy Avilez MD   [facility]    [address]  [facility] [address] [phone]           Bring a current list of meds and any records pertaining to this visit. For Physicals, please bring immunization records and any forms needing to be filled out. Please arrive 10 minutes early to complete paperwork.              Who to contact     If you have questions or need follow up information about today's clinic visit or your schedule please contact [facility] directly at [phone].  Normal or non-critical lab and imaging results will be communicated to you by MyChart, letter or phone within 4 business days after the clinic has received the results. If you do not hear from us within 7 days, please contact the clinic through MyChart or phone. If you have a critical or abnormal lab result, we will notify you by phone as soon as possible.  Submit refill requests through Info or call your pharmacy and they will forward the refill request to us. Please allow 3 business days for  "your refill to be completed.          Additional Information About Your Visit        Conne[employer]ity[employer] Information     [employer] lets you send messages to your doctor, view your test results, renew your prescriptions, schedule appointments and more. To sign up, go to [email or web address] . Click on \"Log in\" on the left side of the screen, which will take you to the Welcome page. Then click on \"Sign up Now\" on the right side of the page.     You will be asked to enter the access code listed below, as well as some personal information. Please follow the directions to create your username and password.     Your access code is: [email or web address]  Expires: 2017 10:40 AM     Your access code will  in 90 days. If you need help or a new code, please call your [place] clinic or [phone].        Care EveryWhere ID     This is your Care EveryWhere ID. This could be used by other organizations to access your [place] medical records  [id number]        Your Vitals Were     Pulse Last Period                114 08/10/2017 (Approximate)           Blood Pressure from Last 3 Encounters:   17 126/88   17 114/74   17 110/70    Weight from Last 3 Encounters:   17 (!) 338 lb 6.4 oz (153.5 kg) (>99 %)*   17 (!) 324 lb (147 kg) (>99 %)*   12/15/16 (!) 324 lb 9.6 oz (147.2 kg) (>99 %)*     * Growth percentiles are based on CDC 2-20 Years data.              We Performed the Following     INJECTION INTRAMUSCULAR OR SUB-Q     Medroxyprogesterone inj  1mg   (Depo Provera J-Code)        Primary Care Provider Office Phone # Fax #    Yohan Beltran [phone] [address] [place] [address]        Equal Access to Services     [facility] CampusKATELIN : Moo Cedeno, nilo glass, kaushal regan . [facility] [phone].    ATENCIÓN: Si habla español, tiene a joseph disposición servicios gratuitos de asistencia lingüística. " Mirza loredo [phone].    We comply with applicable federal civil rights laws and [place] laws. We do not discriminate on the basis of race, color, national origin, age, disability sex, sexual orientation or gender identity.            Thank you!     Thank you for choosing [facility]  for your care. Our goal is always to provide you with excellent care. Hearing back from our patients is one way we can continue to improve our services. Please take a few minutes to complete the written survey that you may receive in the mail after your visit with us. Thank you!             Your Updated Medication List - Protect others around you: Learn how to safely use, store and throw away your medicines at www.disposemymeds.org.          This list is accurate as of: 9/18/17  4:05 PM.  Always use your most recent med list.                   Brand Name Dispense Instructions for use Diagnosis    betamethasone dipropionate 0.05 % cream    DIPROSONE    45 g    Apply sparingly to affected area twice daily as needed.  Do not apply to face.    Eczema, unspecified type       hydrocortisone 2.5 % cream     20 g    Apply topically 2 times daily Can sub form if needed and size as needed    Eczema, unspecified type       medroxyPROGESTERone 150 MG/ML injection    DEPO-PROVERA    3 mL    Inject 1 mL (150 mg) into the muscle every 3 months    Encounter for surveillance of injectable contraceptive, Excessive or frequent menstruation       metFORMIN 500 MG tablet    GLUCOPHAGE    180 tablet    Take 1 tablet (500 mg) by mouth 2 times daily (with meals) Office visit needed prior to additional refills.    Pre-diabetes       sertraline 100 MG tablet    ZOLOFT    90 tablet    Take 2 tablets (200 mg) by mouth daily Office visit needed prior to additional refills.    Adjustment disorder with mixed anxiety and depressed mood

## 2017-09-19 ENCOUNTER — OFFICE VISIT (OUTPATIENT)
Dept: PSYCHOLOGY | Facility: CLINIC | Age: 22
End: 2017-09-19
Payer: COMMERCIAL

## 2017-09-19 DIAGNOSIS — F43.23 ADJUSTMENT DISORDER WITH MIXED ANXIETY AND DEPRESSED MOOD: Primary | ICD-10-CM

## 2017-09-19 PROCEDURE — 90791 PSYCH DIAGNOSTIC EVALUATION: CPT | Performed by: MARRIAGE & FAMILY THERAPIST

## 2017-09-19 ASSESSMENT — ANXIETY QUESTIONNAIRES
1. FEELING NERVOUS, ANXIOUS, OR ON EDGE: NOT AT ALL
3. WORRYING TOO MUCH ABOUT DIFFERENT THINGS: NOT AT ALL
7. FEELING AFRAID AS IF SOMETHING AWFUL MIGHT HAPPEN: NOT AT ALL
2. NOT BEING ABLE TO STOP OR CONTROL WORRYING: NOT AT ALL
6. BECOMING EASILY ANNOYED OR IRRITABLE: NOT AT ALL
IF YOU CHECKED OFF ANY PROBLEMS ON THIS QUESTIONNAIRE, HOW DIFFICULT HAVE THESE PROBLEMS MADE IT FOR YOU TO DO YOUR WORK, TAKE CARE OF THINGS AT HOME, OR GET ALONG WITH OTHER PEOPLE: NOT DIFFICULT AT ALL
GAD7 TOTAL SCORE: 2
5. BEING SO RESTLESS THAT IT IS HARD TO SIT STILL: NOT AT ALL

## 2017-09-19 ASSESSMENT — PATIENT HEALTH QUESTIONNAIRE - PHQ9
SUM OF ALL RESPONSES TO PHQ QUESTIONS 1-9: 3
5. POOR APPETITE OR OVEREATING: MORE THAN HALF THE DAYS

## 2017-09-19 NOTE — MR AVS SNAPSHOT
"                  MRN:[id number]                      After Visit Summary   2017    Susy Huerta    MRN: [id number]           Visit Information        Provider Department      2017 10:00 AM Mckenna Syedaelisabeth Doe [facility] Generic      Your next 10 appointments already scheduled     Sep 26, 2017  2:00 PM CDT   New Visit with EDDI Patricia CNP   [facility]    [address]  [facility] [address] [phone]            Oct 18, 2017  6:00 PM CDT   New Visit with Syeda Nader Andres   [facility])    [address]  [facility] [address] [phone]            Oct 31, 2017  2:30 PM CDT   Office Visit with Josy Avilez MD   [facility]    [address]  [facility] [id number] [phone]           Bring a current list of meds and any records pertaining to this visit. For Physicals, please bring immunization records and any forms needing to be filled out. Please arrive 10 minutes early to complete paperwork.              MyChart Information     [employer] lets you send messages to your doctor, view your test results, renew your prescriptions, schedule appointments and more. To sign up, go to [email or web address] . Click on \"Log in\" on the left side of the screen, which will take you to the Welcome page. Then click on \"Sign up Now\" on the right side of the page.     You will be asked to enter the access code listed below, as well as some personal information. Please follow the directions to create your username and password.     Your access code is: [email or web address]  Expires: 2017 10:40 AM     Your access code will  in 90 days. If you need help or a new code, please call your [place] clinic or [phone].        Care EveryWhere ID     This is your Care EveryWhere ID. This could be used by other " organizations to access your [place] medical records  [id number]        Equal Access to Services     NEELA ALTAMIRANO : Moo Cedeno, nilo glass, kaushal regan. [facility] [phone].    ATENCIÓN: Si habla español, tiene a joseph disposición servicios gratuitos de asistencia lingüística. Llame al [phone].    We comply with applicable federal civil rights laws and [place] laws. We do not discriminate on the basis of race, color, national origin, age, disability sex, sexual orientation or gender identity.
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
## 2017-09-20 ASSESSMENT — ANXIETY QUESTIONNAIRES: GAD7 TOTAL SCORE: 2

## 2017-10-04 ENCOUNTER — ALLIED HEALTH/NURSE VISIT (OUTPATIENT)
Dept: NURSING | Facility: CLINIC | Age: 22
End: 2017-10-04
Payer: COMMERCIAL

## 2017-10-04 DIAGNOSIS — Z23 NEED FOR PROPHYLACTIC VACCINATION AND INOCULATION AGAINST INFLUENZA: Primary | ICD-10-CM

## 2017-10-04 PROCEDURE — 90686 IIV4 VACC NO PRSV 0.5 ML IM: CPT

## 2017-10-04 PROCEDURE — 90471 IMMUNIZATION ADMIN: CPT

## 2017-10-04 PROCEDURE — 99207 ZZC NO CHARGE NURSE ONLY: CPT

## 2017-10-04 NOTE — PROGRESS NOTES
Injectable Influenza Immunization Documentation    1.  Is the person to be vaccinated sick today?   No    2. Does the person to be vaccinated have an allergy to a component   of the vaccine?   No    3. Has the person to be vaccinated ever had a serious reaction   to influenza vaccine in the past?   No    4. Has the person to be vaccinated ever had Guillain-Barré syndrome?   No    Form completed by Matthew Villegas CMA

## 2017-10-04 NOTE — MR AVS SNAPSHOT
After Visit Summary   10/4/2017    Susy Huerta    MRN: [id number]           Patient Information     Date Of Birth          1995        Visit Information        Provider Department      10/4/2017 9:30 AM RI OB NURSE [facility]        Today's Diagnoses     Need for prophylactic vaccination and inoculation against influenza    -  1       Follow-ups after your visit        Your next 10 appointments already scheduled     Oct 18, 2017  6:00 PM CDT   [facility] [place]    [address]  [place] [address]   [phone]            Oct 31, 2017  2:30 PM CDT   Office Visit with Josy Avilez MD   [facility] ([facility])    [address]  [facility] [address] [phone]           Bring a current list of meds and any records pertaining to this visit. For Physicals, please bring immunization records and any forms needing to be filled out. Please arrive 10 minutes early to complete paperwork.              Who to contact     If you have questions or need follow up information about today's clinic visit or your schedule please contact [facility] directly at [phone].  Normal or non-critical lab and imaging results will be communicated to you by [employer], letter or phone within 4 business days after the clinic has received the results. If you do not hear from us within 7 days, please contact the clinic through [employer] or phone. If you have a critical or abnormal lab result, we will notify you by phone as soon as possible.  Submit refill requests through [employer] or call your pharmacy and they will forward the refill request to us. Please allow 3 business days for your refill to be completed.          Additional Information About Your Visit        [employer] Information     [employer] lets you send messages to your doctor, view your test results,  "renew your prescriptions, schedule appointments and more. To sign up, go to [email or web address] . Click on \"Log in\" on the left side of the screen, which will take you to the Welcome page. Then click on \"Sign up Now\" on the right side of the page.     You will be asked to enter the access code listed below, as well as some personal information. Please follow the directions to create your username and password.     Your access code is: [email or web address]  Expires: 2017 10:40 AM     Your access code will  in 90 days. If you need help or a new code, please call your [place] clinic or [phone].        Care EveryWhere ID     This is your Care EveryWhere ID. This could be used by other organizations to access your [place] medical records  [id number]        Your Vitals Were     Last Period                   08/10/2017 (Approximate)            Blood Pressure from Last 3 Encounters:   17 126/88   17 114/74   17 110/70    Weight from Last 3 Encounters:   17 (!) 338 lb 6.4 oz (153.5 kg) (>99 %)*   17 (!) 324 lb (147 kg) (>99 %)*   12/15/16 (!) 324 lb 9.6 oz (147.2 kg) (>99 %)*     * Growth percentiles are based on [facility] 2-20 Years data.              We Performed the Following     FLU VAC, SPLIT VIRUS IM > 3 YO (QUADRIVALENT) [31649]     Vaccine Administration, Initial [27663]        Primary Care Provider Office Phone # Fax #    Yohan Beltran [phone] [address] [place] [address]        Equal Access to Services     [facility] EVELIA : Moo Cedeno, nilo glass, kaushal regan. So [facility] [phone].    ATENCIÓN: Si habla español, tiene a joseph disposición servicios gratuitos de asistencia lingüística. Llame al [phone].    We comply with applicable federal civil rights laws and [place] laws. We do not discriminate on the basis of race, color, national origin, age, " disability, sex, sexual orientation, or gender identity.            Thank you!     Thank you for choosing [facility]  for your care. Our goal is always to provide you with excellent care. Hearing back from our patients is one way we can continue to improve our services. Please take a few minutes to complete the written survey that you may receive in the mail after your visit with us. Thank you!             Your Updated Medication List - Protect others around you: Learn how to safely use, store and throw away your medicines at www.disposemymeds.org.          This list is accurate as of: 10/4/17  9:33 AM.  Always use your most recent med list.                   Brand Name Dispense Instructions for use Diagnosis    betamethasone dipropionate 0.05 % cream    DIPROSONE    45 g    Apply sparingly to affected area twice daily as needed.  Do not apply to face.    Eczema, unspecified type       hydrocortisone 2.5 % cream     20 g    Apply topically 2 times daily Can sub form if needed and size as needed    Eczema, unspecified type       medroxyPROGESTERone 150 MG/ML injection    DEPO-PROVERA    3 mL    Inject 1 mL (150 mg) into the muscle every 3 months    Encounter for surveillance of injectable contraceptive, Excessive or frequent menstruation       metFORMIN 500 MG tablet    GLUCOPHAGE    180 tablet    Take 1 tablet (500 mg) by mouth 2 times daily (with meals) Office visit needed prior to additional refills.    Pre-diabetes       sertraline 100 MG tablet    ZOLOFT    90 tablet    Take 2 tablets (200 mg) by mouth daily Office visit needed prior to additional refills.    Adjustment disorder with mixed anxiety and depressed mood

## 2017-10-06 ENCOUNTER — TELEPHONE (OUTPATIENT)
Dept: PEDIATRICS | Facility: CLINIC | Age: 22
End: 2017-10-06

## 2017-10-06 NOTE — TELEPHONE ENCOUNTER
Patient called     Stated she is currently having another menstrual cycle after having one about 3 weeks ago.       Accompanying Signs & Symptoms:   Any vaginal symptoms: abnormal vaginal bleeding  Fever: no  Abdominal/Pelvic Pain: YES  Back pain: no  Flank pain: no  Nausea and vomiting: no  Sexually Active: no  Possibility of pregnancy: Jeannie's last menstrual period was 08/10/2017 (approximate).  Patient reports she had a period last September just before obtaining her Depo injection on 9/18/2017  Flow is heavy with dark clots. Changing pad about 1 every 2-3 hours. Denies lightheadedness/dizziness. Denies diaphoresis or feeling faint.      She reports she is uncertain as to why she is having unscheduled bleeding when she has been on the depo for almost a year.     Triage advised to keep OB appointment with Dr. Avilez and may call to see if there are any sooner appointments or cancellations.     Advised to be seen in the ER if develops fever, weakness, increased pelvic pain (in ability to stand up straight or walk), sudden unexpected bright red bleeding, soaking >2 pads or tampons per hour, SOB, or dizziness/lightheadedness    Pt. Expressed understanding. Agreed to plan. Advised she can call if she has further questions or concerns.     Diana SOTO RN, BSN, PHN  Mireya Garcia RN

## 2017-10-31 ENCOUNTER — OFFICE VISIT (OUTPATIENT)
Dept: OBGYN | Facility: CLINIC | Age: 22
End: 2017-10-31
Payer: COMMERCIAL

## 2017-10-31 VITALS
SYSTOLIC BLOOD PRESSURE: 134 MMHG | HEART RATE: 80 BPM | HEIGHT: 70 IN | BODY MASS INDEX: 41.95 KG/M2 | WEIGHT: 293 LBS | DIASTOLIC BLOOD PRESSURE: 76 MMHG

## 2017-10-31 DIAGNOSIS — N92.0 EXCESSIVE OR FREQUENT MENSTRUATION: Primary | ICD-10-CM

## 2017-10-31 PROCEDURE — 99214 OFFICE O/P EST MOD 30 MIN: CPT | Performed by: OBSTETRICS & GYNECOLOGY

## 2017-10-31 NOTE — PATIENT INSTRUCTIONS
Levonorgestrel intrauterine device (IUD)  Brand Names: Kyleena, LILETTA, Mirena, Carine  What is this medicine?  LEVONORGESTREL IUD (AISSATOU sarmiento) is a contraceptive (birth control) device. The device is placed inside the uterus by a healthcare professional. It is used to prevent pregnancy. This device can also be used to treat heavy bleeding that occurs during your period.  How should I use this medicine?  This device is placed inside the uterus by a health care professional.  Talk to your pediatrician regarding the use of this medicine in children. Special care may be needed.  What side effects may I notice from receiving this medicine?  Side effects that you should report to your doctor or health care professional as soon as possible:    allergic reactions like skin rash, itching or hives, swelling of the face, lips, or tongue    fever, flu-like symptoms    genital sores    high blood pressure    no menstrual period for 6 weeks during use    pain, swelling, warmth in the leg    pelvic pain or tenderness    severe or sudden headache    signs of pregnancy    stomach cramping    sudden shortness of breath    trouble with balance, talking, or walking    unusual vaginal bleeding, discharge    yellowing of the eyes or skin  Side effects that usually do not require medical attention (report to your doctor or health care professional if they continue or are bothersome):    acne    breast pain    change in sex drive or performance    changes in weight    cramping, dizziness, or faintness while the device is being inserted    headache    irregular menstrual bleeding within first 3 to 6 months of use    nausea  What may interact with this medicine?  Do not take this medicine with any of the following medications:    amprenavir    bosentan    fosamprenavir  This medicine may also interact with the following medications:    aprepitant    barbiturate medicines for inducing sleep or treating  seizures    bexarotene    griseofulvin    medicines to treat seizures like carbamazepine, ethotoin, felbamate, oxcarbazepine, phenytoin, topiramate    modafinil    pioglitazone    rifabutin    rifampin    rifapentine    some medicines to treat HIV infection like atazanavir, indinavir, lopinavir, nelfinavir, tipranavir, ritonavir    [place]'s wort    warfarin  What if I miss a dose?  This does not apply. Depending on the brand of device you have inserted, the device will need to be replaced every 3 to 5 years if you wish to continue using this type of birth control.  Where should I keep my medicine?  This does not apply.  What should I tell my health care provider before I take this medicine?  They need to know if you have any of these conditions:    abnormal Pap smear    cancer of the breast, uterus, or cervix    diabetes    endometritis    genital or pelvic infection now or in the past    have more than one sexual partner or your partner has more than one partner    heart disease    history of an ectopic or tubal pregnancy    immune system problems    IUD in place    liver disease or tumor    problems with blood clots or take blood-thinners    seizures    use intravenous drugs    uterus of unusual shape    vaginal bleeding that has not been explained    an unusual or allergic reaction to levonorgestrel, other hormones, silicone, or polyethylene, medicines, foods, dyes, or preservatives    pregnant or trying to get pregnant    breast-feeding  What should I watch for while using this medicine?  Visit your doctor or health care professional for regular check ups. See your doctor if you or your partner has sexual contact with others, becomes HIV positive, or gets a sexual transmitted disease.  This product does not protect you against HIV infection (AIDS) or other sexually transmitted diseases.  You can check the placement of the IUD yourself by reaching up to the top of your vagina with clean fingers to feel the  threads. Do not pull on the threads. It is a good habit to check placement after each menstrual period. Call your doctor right away if you feel more of the IUD than just the threads or if you cannot feel the threads at all.  The IUD may come out by itself. You may become pregnant if the device comes out. If you notice that the IUD has come out use a backup birth control method like condoms and call your health care provider.  Using tampons will not change the position of the IUD and are okay to use during your period.  This IUD can be safely scanned with magnetic resonance imaging (MRI) only under specific conditions. Before you have an MRI, tell your healthcare provider that you have an IUD in place, and which type of IUD you have in place.  NOTE:This sheet is a summary. It may not cover all possible information. If you have questions about this medicine, talk to your doctor, pharmacist, or health care provider. Copyright  2017 [employer]        Etonogestrel Implant  What is this medicine?  ETONOGESTREL (et [place] yessica NATACHA trel) is a contraceptive (birth control) device. It is used to prevent pregnancy. It can be used for up to 3 years.  This medicine may be used for other purposes; ask your health care provider or pharmacist if you have questions.  What should I tell my health care provider before I take this medicine?  They need to know if you have any of these conditions:    abnormal vaginal bleeding    blood vessel disease or blood clots    cancer of the breast, cervix, or liver    depression    diabetes    gallbladder disease    headaches    heart disease or recent heart attack    high blood pressure    high cholesterol    kidney disease    liver disease    renal disease    seizures    tobacco smoker    an unusual or allergic reaction to etonogestrel, other hormones, anesthetics or antiseptics, medicines, foods, dyes, or preservatives    pregnant or trying to get pregnant    breast-feeding  How should I use this  medicine?  This device is inserted just under the skin on the inner side of your upper arm by a health care professional.  Talk to your pediatrician regarding the use of this medicine in children. Special care may be needed.  Overdosage: If you think you've taken too much of this medicine contact a poison control center or emergency room at once.  NOTE: This medicine is only for you. Do not share this medicine with others.  What if I miss a dose?  This does not apply.  What may interact with this medicine?  Do not take this medicine with any of the following medications:    amprenavir    bosentan    fosamprenavir  This medicine may also interact with the following medications:    barbiturate medicines for inducing sleep or treating seizures    certain medicines for fungal infections like ketoconazole and itraconazole    griseofulvin    medicines to treat seizures like carbamazepine, felbamate, oxcarbazepine, phenytoin, topiramate    modafinil    phenylbutazone    rifampin    some medicines to treat HIV infection like atazanavir, indinavir, lopinavir, nelfinavir, tipranavir, ritonavir    [place]'s wort  This list may not describe all possible interactions. Give your health care provider a list of all the medicines, herbs, non-prescription drugs, or dietary supplements you use. Also tell them if you smoke, drink alcohol, or use illegal drugs. Some items may interact with your medicine.  What should I watch for while using this medicine?  This product does not protect you against HIV infection (AIDS) or other sexually transmitted diseases.  You should be able to feel the implant by pressing your fingertips over the skin where it was inserted. Contact your doctor if you cannot feel the implant, and use a non-hormonal birth control method (such as condoms) until your doctor confirms that the implant is in place. If you feel that the implant may have broken or become bent while in your arm, contact your healthcare  provider.  What side effects may I notice from receiving this medicine?  Side effects that you should report to your doctor or health care professional as soon as possible:    allergic reactions like skin rash, itching or hives, swelling of the face, lips, or tongue    breast lumps    changes in emotions or moods    depressed mood    heavy or prolonged menstrual bleeding    pain, irritation, swelling, or bruising at the insertion site    scar at site of insertion    signs of infection at the insertion site such as fever, and skin redness, pain or discharge    signs of pregnancy    signs and symptoms of a blood clot such as breathing problems; changes in vision; chest pain; severe, sudden headache; pain, swelling, warmth in the leg; trouble speaking; sudden numbness or weakness of the face, arm or leg    signs and symptoms of liver injury like dark yellow or brown urine; general ill feeling or flu-like symptoms; light-colored stools; loss of appetite; nausea; right upper belly pain; unusually weak or tired; yellowing of the eyes or skin    unusual vaginal bleeding, discharge    signs and symptoms of a stroke like changes in vision; confusion; trouble speaking or understanding; severe headaches; sudden numbness or weakness of the face, arm or leg; trouble walking; dizziness; loss of balance or coordination  Side effects that usually do not require medical attention (Report these to your doctor or health care professional if they continue or are bothersome.):    acne    back pain    breast pain    changes in weight    dizziness    general ill feeling or flu-like symptoms    headache    irregular menstrual bleeding    nausea    sore throat    vaginal irritation or inflammation  This list may not describe all possible side effects. Call your doctor for medical advice about side effects. You may report side effects to FDA at 7-894-FDA-5727.  Where should I keep my medicine?  This drug is given in a hospital or clinic and  will not be stored at home.  NOTE: This sheet is a summary. It may not cover all possible information. If you have questions about this medicine, talk to your doctor, pharmacist, or health care provider.  NOTE:This sheet is a summary. It may not cover all possible information. If you have questions about this medicine, talk to your doctor, pharmacist, or health care provider. Copyright  2016 [employer]

## 2017-10-31 NOTE — MR AVS SNAPSHOT
After Visit Summary   10/31/2017    Susy Huerta    MRN: [id number]           Patient Information     Date Of Birth          1995        Visit Information        Provider Department      10/31/2017 2:30 PM Josy Avilez MD [facility]        Today's Diagnoses     Excessive or frequent menstruation    -  1      Care Instructions      Levonorgestrel intrauterine device (IUD)  Brand Names: Kyleena, LILETTA, Mirena, Carine  What is this medicine?  LEVONORGESTREL IUD (AISSATOU voe nor wilfredo trel) is a contraceptive (birth control) device. The device is placed inside the uterus by a healthcare professional. It is used to prevent pregnancy. This device can also be used to treat heavy bleeding that occurs during your period.  How should I use this medicine?  This device is placed inside the uterus by a health care professional.  Talk to your pediatrician regarding the use of this medicine in children. Special care may be needed.  What side effects may I notice from receiving this medicine?  Side effects that you should report to your doctor or health care professional as soon as possible:    allergic reactions like skin rash, itching or hives, swelling of the face, lips, or tongue    fever, flu-like symptoms    genital sores    high blood pressure    no menstrual period for 6 weeks during use    pain, swelling, warmth in the leg    pelvic pain or tenderness    severe or sudden headache    signs of pregnancy    stomach cramping    sudden shortness of breath    trouble with balance, talking, or walking    unusual vaginal bleeding, discharge    yellowing of the eyes or skin  Side effects that usually do not require medical attention (report to your doctor or health care professional if they continue or are bothersome):    acne    breast pain    change in sex drive or performance    changes in weight    cramping, dizziness, or faintness while the device is being inserted    headache    irregular  menstrual bleeding within first 3 to 6 months of use    nausea  What may interact with this medicine?  Do not take this medicine with any of the following medications:    amprenavir    bosentan    fosamprenavir  This medicine may also interact with the following medications:    aprepitant    barbiturate medicines for inducing sleep or treating seizures    bexarotene    griseofulvin    medicines to treat seizures like carbamazepine, ethotoin, felbamate, oxcarbazepine, phenytoin, topiramate    modafinil    pioglitazone    rifabutin    rifampin    rifapentine    some medicines to treat HIV infection like atazanavir, indinavir, lopinavir, nelfinavir, tipranavir, ritonavir    Marielle's wort    warfarin  What if I miss a dose?  This does not apply. Depending on the brand of device you have inserted, the device will need to be replaced every 3 to 5 years if you wish to continue using this type of birth control.  Where should I keep my medicine?  This does not apply.  What should I tell my health care provider before I take this medicine?  They need to know if you have any of these conditions:    abnormal Pap smear    cancer of the breast, uterus, or cervix    diabetes    endometritis    genital or pelvic infection now or in the past    have more than one sexual partner or your partner has more than one partner    heart disease    history of an ectopic or tubal pregnancy    immune system problems    IUD in place    liver disease or tumor    problems with blood clots or take blood-thinners    seizures    use intravenous drugs    uterus of unusual shape    vaginal bleeding that has not been explained    an unusual or allergic reaction to levonorgestrel, other hormones, silicone, or polyethylene, medicines, foods, dyes, or preservatives    pregnant or trying to get pregnant    breast-feeding  What should I watch for while using this medicine?  Visit your doctor or health care professional for regular check ups. See your  doctor if you or your partner has sexual contact with others, becomes HIV positive, or gets a sexual transmitted disease.  This product does not protect you against HIV infection (AIDS) or other sexually transmitted diseases.  You can check the placement of the IUD yourself by reaching up to the top of your vagina with clean fingers to feel the threads. Do not pull on the threads. It is a good habit to check placement after each menstrual period. Call your doctor right away if you feel more of the IUD than just the threads or if you cannot feel the threads at all.  The IUD may come out by itself. You may become pregnant if the device comes out. If you notice that the IUD has come out use a backup birth control method like condoms and call your health care provider.  Using tampons will not change the position of the IUD and are okay to use during your period.  This IUD can be safely scanned with magnetic resonance imaging (MRI) only under specific conditions. Before you have an MRI, tell your healthcare provider that you have an IUD in place, and which type of IUD you have in place.  NOTE:This sheet is a summary. It may not cover all possible information. If you have questions about this medicine, talk to your doctor, pharmacist, or health care provider. Copyright  2017 [employer]        Etonogestrel Implant  What is this medicine?  ETONOGESTREL (et oh yessica NATACHA trel) is a contraceptive (birth control) device. It is used to prevent pregnancy. It can be used for up to 3 years.  This medicine may be used for other purposes; ask your health care provider or pharmacist if you have questions.  What should I tell my health care provider before I take this medicine?  They need to know if you have any of these conditions:    abnormal vaginal bleeding    blood vessel disease or blood clots    cancer of the breast, cervix, or liver    depression    diabetes    gallbladder disease    headaches    heart disease or recent heart  attack    high blood pressure    high cholesterol    kidney disease    liver disease    renal disease    seizures    tobacco smoker    an unusual or allergic reaction to etonogestrel, other hormones, anesthetics or antiseptics, medicines, foods, dyes, or preservatives    pregnant or trying to get pregnant    breast-feeding  How should I use this medicine?  This device is inserted just under the skin on the inner side of your upper arm by a health care professional.  Talk to your pediatrician regarding the use of this medicine in children. Special care may be needed.  Overdosage: If you think you've taken too much of this medicine contact a poison control center or emergency room at once.  NOTE: This medicine is only for you. Do not share this medicine with others.  What if I miss a dose?  This does not apply.  What may interact with this medicine?  Do not take this medicine with any of the following medications:    amprenavir    bosentan    fosamprenavir  This medicine may also interact with the following medications:    barbiturate medicines for inducing sleep or treating seizures    certain medicines for fungal infections like ketoconazole and itraconazole    griseofulvin    medicines to treat seizures like carbamazepine, felbamate, oxcarbazepine, phenytoin, topiramate    modafinil    phenylbutazone    rifampin    some medicines to treat HIV infection like atazanavir, indinavir, lopinavir, nelfinavir, tipranavir, ritonavir    Marielle's wort  This list may not describe all possible interactions. Give your health care provider a list of all the medicines, herbs, non-prescription drugs, or dietary supplements you use. Also tell them if you smoke, drink alcohol, or use illegal drugs. Some items may interact with your medicine.  What should I watch for while using this medicine?  This product does not protect you against HIV infection (AIDS) or other sexually transmitted diseases.  You should be able to feel the  implant by pressing your fingertips over the skin where it was inserted. Contact your doctor if you cannot feel the implant, and use a non-hormonal birth control method (such as condoms) until your doctor confirms that the implant is in place. If you feel that the implant may have broken or become bent while in your arm, contact your healthcare provider.  What side effects may I notice from receiving this medicine?  Side effects that you should report to your doctor or health care professional as soon as possible:    allergic reactions like skin rash, itching or hives, swelling of the face, lips, or tongue    breast lumps    changes in emotions or moods    depressed mood    heavy or prolonged menstrual bleeding    pain, irritation, swelling, or bruising at the insertion site    scar at site of insertion    signs of infection at the insertion site such as fever, and skin redness, pain or discharge    signs of pregnancy    signs and symptoms of a blood clot such as breathing problems; changes in vision; chest pain; severe, sudden headache; pain, swelling, warmth in the leg; trouble speaking; sudden numbness or weakness of the face, arm or leg    signs and symptoms of liver injury like dark yellow or brown urine; general ill feeling or flu-like symptoms; light-colored stools; loss of appetite; nausea; right upper belly pain; unusually weak or tired; yellowing of the eyes or skin    unusual vaginal bleeding, discharge    signs and symptoms of a stroke like changes in vision; confusion; trouble speaking or understanding; severe headaches; sudden numbness or weakness of the face, arm or leg; trouble walking; dizziness; loss of balance or coordination  Side effects that usually do not require medical attention (Report these to your doctor or health care professional if they continue or are bothersome.):    acne    back pain    breast pain    changes in weight    dizziness    general ill feeling or flu-like  "symptoms    headache    irregular menstrual bleeding    nausea    sore throat    vaginal irritation or inflammation  This list may not describe all possible side effects. Call your doctor for medical advice about side effects. You may report side effects to FDA at 9-158-YJH-1425.  Where should I keep my medicine?  This drug is given in a hospital or clinic and will not be stored at home.  NOTE: This sheet is a summary. It may not cover all possible information. If you have questions about this medicine, talk to your doctor, pharmacist, or health care provider.  NOTE:This sheet is a summary. It may not cover all possible information. If you have questions about this medicine, talk to your doctor, pharmacist, or health care provider. Copyright  2016 [employer]                Follow-ups after your visit        Who to contact     If you have questions or need follow up information about today's clinic visit or your schedule please contact [facility] directly at [phone].  Normal or non-critical lab and imaging results will be communicated to you by [employer], letter or phone within 4 business days after the clinic has received the results. If you do not hear from us within 7 days, please contact the clinic through [employer] or phone. If you have a critical or abnormal lab result, we will notify you by phone as soon as possible.  Submit refill requests through [employer] or call your pharmacy and they will forward the refill request to us. Please allow 3 business days for your refill to be completed.          Additional Information About Your Visit        [employer] Information     [employer] lets you send messages to your doctor, view your test results, renew your prescriptions, schedule appointments and more. To sign up, go to [email or web address] . Click on \"Log in\" on the left side of the screen, which will take you to the Welcome page. Then click on \"Sign up Now\" on the right side of the page.     You " "will be asked to enter the access code listed below, as well as some personal information. Please follow the directions to create your username and password.     Your access code is: [email or web address]  Expires: 2017 10:40 AM     Your access code will  in 90 days. If you need help or a new code, please call your [facility] clinic or [phone].        Care EveryWhere ID     This is your Care EveryWhere ID. This could be used by other organizations to access your [facility] medical records  [id number]        Your Vitals Were     Pulse Height Last Period Breastfeeding? BMI (Body Mass Index)       80 5' 10\" (1.778 m) 10/13/2017 No 48.21 kg/m2        Blood Pressure from Last 3 Encounters:   10/31/17 134/76   17 126/88   17 114/74    Weight from Last 3 Encounters:   10/31/17 (!) 336 lb (152.4 kg) (>99 %)*   17 (!) 338 lb 6.4 oz (153.5 kg) (>99 %)*   17 (!) 324 lb (147 kg) (>99 %)*     * Growth percentiles are based on CDC 2-20 Years data.              Today, you had the following     No orders found for display       Primary Care Provider Office Phone # Fax #    Yohan Beltran [phone] [address] [place] [address]        Equal Access to Services     [facility] AH: Hadii kandy simpsono Sojanet, waaxda luqadaha, qaybta kaalmada sebastián, kaushal pleitez . So [facility] [phone].    ATENCIÓN: Si habla español, tiene a joseph disposición servicios gratuitos de asistencia lingüística. Llame al [phone].    We comply with applicable federal civil rights laws and [place] laws. We do not discriminate on the basis of race, color, national origin, age, disability, sex, sexual orientation, or gender identity.            Thank you!     Thank you for choosing [facility]  for your care. Our goal is always to provide you with excellent care. Hearing back from our patients is one way we can continue to improve our services. " Please take a few minutes to complete the written survey that you may receive in the mail after your visit with us. Thank you!             Your Updated Medication List - Protect others around you: Learn how to safely use, store and throw away your medicines at www.disposemymeds.org.          This list is accurate as of: 10/31/17  3:02 PM.  Always use your most recent med list.                   Brand Name Dispense Instructions for use Diagnosis    betamethasone dipropionate 0.05 % cream    DIPROSONE    45 g    Apply sparingly to affected area twice daily as needed.  Do not apply to face.    Eczema, unspecified type       hydrocortisone 2.5 % cream     20 g    Apply topically 2 times daily Can sub form if needed and size as needed    Eczema, unspecified type       medroxyPROGESTERone 150 MG/ML injection    DEPO-PROVERA    3 mL    Inject 1 mL (150 mg) into the muscle every 3 months    Encounter for surveillance of injectable contraceptive, Excessive or frequent menstruation       metFORMIN 500 MG tablet    GLUCOPHAGE    180 tablet    Take 1 tablet (500 mg) by mouth 2 times daily (with meals) Office visit needed prior to additional refills.    Pre-diabetes       sertraline 100 MG tablet    ZOLOFT    90 tablet    Take 2 tablets (200 mg) by mouth daily Office visit needed prior to additional refills.    Adjustment disorder with mixed anxiety and depressed mood

## 2017-10-31 NOTE — PROGRESS NOTES
SUBJECTIVE:                                                   Susy Huerta is a 21 year old female who presents to clinic today for the following health issue(s):  Patient presents with:  Abnormal Bleeding Problem: Cramping, bleeding and clots x 2 while on Depo in the past year.       HPI:  Patient with a history of dilation and curettage and then treatment with depo for heavy periods. Dilation and curettage was in , chronic endometritis, otherwise negative. Depo worked well until September, when she had 3 weeks of bleeding, some days quite heavy and crampy. This happened again for a week and a half in October. She is wondering about other methods of treatment.    Patient's last menstrual period was 10/13/2017..   Patient is not sexually active, .  Using not sexually active for contraception.     Problem list and histories reviewed & adjusted, as indicated.  Additional history: as documented.    Patient Active Problem List   Diagnosis     Allergic rhinitis     Obesity     Adjustment disorder with mixed anxiety and depressed mood     Essential hypertension     Family history of DVT     Major depressive disorder, recurrent episode, mild (H)     Eczema, unspecified eczema     BMI 50.0-59.9, adult (H)     Pre-diabetes     Contraception     Past Surgical History:   Procedure Laterality Date     DILATION AND CURETTAGE, HYSTEROSCOPY DIAGNOSTIC, COMBINED N/A 2015    Procedure: COMBINED DILATION AND CURETTAGE, HYSTEROSCOPY DIAGNOSTIC;  Surgeon: Soraida Rosado MD;  Location: [facility] OR     NO HISTORY OF SURGERY        Social History   Substance Use Topics     Smoking status: Never Smoker     Smokeless tobacco: Never Used      Comment: father smokes, lives in [place]     Alcohol use No      Problem (# of Occurrences) Relation (Name,Age of Onset)    Cancer - colorectal (1) Maternal Grandfather    Hypertension (4) Maternal Grandmother, Maternal Grandfather, Mother, Maternal Uncle    Other Cancer (1)  "Maternal Grandmother: lung cancer    Respiratory (1) Maternal Grandmother: emphasema              Current Outpatient Prescriptions on File Prior to Visit:  sertraline (ZOLOFT) 100 MG tablet Take 2 tablets (200 mg) by mouth daily Office visit needed prior to additional refills.   metFORMIN (GLUCOPHAGE) 500 MG tablet Take 1 tablet (500 mg) by mouth 2 times daily (with meals) Office visit needed prior to additional refills.   medroxyPROGESTERone (DEPO-PROVERA) 150 MG/ML injection Inject 1 mL (150 mg) into the muscle every 3 months   hydrocortisone 2.5 % cream Apply topically 2 times daily Can sub form if needed and size as needed (Patient not taking: Reported on 9/8/2017)   betamethasone dipropionate (DIPROSONE) 0.05 % cream Apply sparingly to affected area twice daily as needed.  Do not apply to face. (Patient not taking: Reported on 9/8/2017)     No current facility-administered medications on file prior to visit.   Allergies   Allergen Reactions     Amoxicillin Anaphylaxis     Cefzil [Cefprozil] Hives     Sulfa Drugs Other (See Comments)     n/v       ROS:  5 point ROS negative except as noted above in HPI, including Gen., Resp., CV, GI &  system review.    OBJECTIVE:     /76 (BP Location: Right arm, Patient Position: Sitting, Cuff Size: Adult Large)  Pulse 80  Ht 5' 10\" (1.778 m)  Wt (!) 336 lb (152.4 kg)  LMP 10/13/2017  Breastfeeding? No  BMI 48.21 kg/m2   BMI: Body mass index is 48.21 kg/(m^2).  General: Alert and oriented, no distress.  Psychiatric: Mood and affect within normal limits.  Skin: Warm and dry, no lesions, rashes or discolorations.  No other examination was necessary for today's appointment.    In-Clinic Test Results:  No results found for this or any previous visit (from the past 24 hour(s)).    ASSESSMENT/PLAN:                                                        ICD-10-CM    1. Excessive or frequent menstruation N92.0          Other options discussed, including Mirena/Kyleena, " Nexplanon, or progesterone only oral contraceptive pills. There is a strong family history of PE/DVT, including in her mother, sister, maternal aunt, maternal grandmother--hematology workup was reportedly negative for all family members, but obviously I (and she) would prefer to avoid estrogen containing oral contraceptive pills for this reason. She will think over her options and let me know soon if she would like to switch methods.    Josy Avilez MD  [facility]

## 2017-10-31 NOTE — NURSING NOTE
"Chief Complaint   Patient presents with     Abnormal Bleeding Problem     Cramping, bleeding and clots x 2 while on Depo in the past year.        Initial /76 (BP Location: Right arm, Patient Position: Sitting, Cuff Size: Adult Large)  Pulse 80  Ht 5' 10\" (1.778 m)  Wt (!) 336 lb (152.4 kg)  LMP 10/13/2017  Breastfeeding? No  BMI 48.21 kg/m2 Estimated body mass index is 48.21 kg/(m^2) as calculated from the following:    Height as of this encounter: 5' 10\" (1.778 m).    Weight as of this encounter: 336 lb (152.4 kg).  Medication Reconciliation: complete   Cathi Coley LPN      "

## 2017-11-28 ENCOUNTER — OFFICE VISIT (OUTPATIENT)
Dept: DERMATOLOGY | Facility: CLINIC | Age: 22
End: 2017-11-28
Payer: COMMERCIAL

## 2017-11-28 DIAGNOSIS — L30.9 DERMATITIS: Primary | ICD-10-CM

## 2017-11-28 DIAGNOSIS — L29.9 PRURITUS: ICD-10-CM

## 2017-11-28 PROCEDURE — 99243 OFF/OP CNSLTJ NEW/EST LOW 30: CPT | Performed by: DERMATOLOGY

## 2017-11-28 RX ORDER — DESONIDE 0.5 MG/G
OINTMENT TOPICAL
Qty: 60 G | Refills: 1 | Status: SHIPPED | OUTPATIENT
Start: 2017-11-28 | End: 2018-11-16

## 2017-11-28 RX ORDER — FLUOCINONIDE 0.5 MG/G
OINTMENT TOPICAL
Qty: 60 G | Refills: 2 | Status: SHIPPED | OUTPATIENT
Start: 2017-11-28 | End: 2018-11-16

## 2017-11-28 NOTE — PROGRESS NOTES
Dermatology Clinic Note    Dermatology Problem List:  1. History of atopic dermatitis  2. Hand dermatitis  3. Axillary pillar dermatitis      CC: Patient presents with:  Consult: new pt, PCP is Devin Lopez, pt hx of eczema on hands and patches on the front and back of axillary area tx with Eucerin-mostly on hand     HPI: Susy Huerta is a 21 year old female presenting for initial evaluation of dermatitis at the request of Dr. Beltran.  She has history of atopic dermatitis as a child which improved during her teen years. In the last 2 years she has developed dermatitis on the hands and axillary pillars. Rashes are pruritic. Tried hydrocortisone 2.5% cream to hands without benefit followed by betametasone diproprionate cream. No treatment to the axilla. Uses Softsoap and Dial for handwashing. Uses Dove soap in the shower. Applies Eucerin to hands at night. Not consistently using moisturizer on the axilla. Washes dishes with dish soap without gloves.  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       Patient Active Problem List   Diagnosis     Allergic rhinitis     Obesity     Adjustment disorder with mixed anxiety and depressed mood     Essential hypertension     Family history of DVT     Major depressive disorder, recurrent episode, mild (H)     Eczema, unspecified eczema     BMI 50.0-59.9, adult (H)     Pre-diabetes     Contraception       Allergies   Allergen Reactions     Amoxicillin Anaphylaxis     Cefzil [Cefprozil] Hives     Sulfa Drugs Other (See Comments)     n/v         Current Outpatient Prescriptions   Medication     desonide (DESOWEN) 0.05 % ointment     fluocinonide (LIDEX) 0.05 % ointment     sertraline (ZOLOFT) 100 MG tablet     metFORMIN (GLUCOPHAGE) 500 MG tablet     medroxyPROGESTERone (DEPO-PROVERA) 150 MG/ML injection     hydrocortisone 2.5 % cream     betamethasone dipropionate (DIPROSONE) 0.05 % cream     No current facility-administered medications for this visit.         Family History   Problem Relation Age of Onset     Respiratory Maternal Grandmother      emphasema     Hypertension Maternal Grandmother      Other Cancer Maternal Grandmother      lung cancer     Cancer - colorectal Maternal Grandfather      Hypertension Maternal Grandfather      Hypertension Mother      Hypertension Maternal Uncle        Social History     Social History     Marital status: Single     Spouse name: N/A     Number of children: N/A     Years of education: N/A     Occupational History     Not on file.     Social History Main Topics     Smoking status: Never Smoker     Smokeless tobacco: Never Used      Comment: father smokes, lives in [place]     Alcohol use No     Drug use: No     Sexual activity: No     Other Topics Concern     Not on file     Social History Narrative    moved from [place], [place] 2003; lives with mom and 2 sisters    flys, with mom, back to [place] to visit father 1-3x/month.  Doesn't like flying.         ROS: Feeling well without other skin concerns.     EXAM:  LMP 10/13/2017  GEN: Alert, no distress  HEENT: Conjunctiva clear.   PULM: Breathing comfortably on RA  CV: Extrem warm and well perfused  ABD: No distension  SKIN: Exam of the hands, axillae, and upper arms  --Well demarcated thin pink plaques on the anterior axillary pillars extending onto the anterior upper arms and lateral chest   --Mild erythema and lichenification to the bilateral dorsal hands and web spaces      Assessment and Plan:    1. Dermatitis  Dermatitis of hands and axillary pillars. Noted that the differential diagnosis includes irritant and allergic contact dermatitis. I suggested hypoallergenic skin care regimen with use of a more mild cleanser to wash hands and avoidance of dish soap. Apply lidex ointment BID to hands and desonide ointment BID to the arm/axillary rash. If lesions clear but continue to recur would consider referral for patch testing.   - desonide (DESOWEN) 0.05 % ointment; Twice daily to arm  rash until completely clear.  Dispense: 60 g; Refill: 1  - fluocinonide (LIDEX) 0.05 % ointment; Twice daily to hand rash until completely clear, then as needed.  Dispense: 60 g; Refill: 2    RTC 6 weeks.     Thank you for involving me in this patient's care.     Joaquina Dubon MD  Dermatology Staff    CC:    Yohan Beltran MD

## 2017-11-28 NOTE — LETTER
11/28/2017      RE: Susy Huerta  [address]   [place] [address]       Dermatology Clinic Note    Dermatology Problem List:  1. History of atopic dermatitis  2. Hand dermatitis  3. Axillary pillar dermatitis      CC: Patient presents with:  Consult: new pt, PCP is Devin Lopez, pt hx of eczema on hands and patches on the front and back of axillary area tx with Eucerin-mostly on hand     HPI: Susy Huerta is a 21 year old female presenting for initial evaluation of dermatitis at the request of Dr. Beltran.  She has history of atopic dermatitis as a child which improved during her teen years. In the last 2 years she has developed dermatitis on the hands and axillary pillars. Rashes are pruritic. Tried hydrocortisone 2.5% cream to hands without benefit followed by betametasone diproprionate cream. No treatment to the axilla. Uses Softsoap and Dial for handwashing. Uses Dove soap in the shower. Applies Eucerin to hands at night. Not consistently using moisturizer on the axilla. Washes dishes with dish soap without gloves.  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       Patient Active Problem List   Diagnosis     Allergic rhinitis     Obesity     Adjustment disorder with mixed anxiety and depressed mood     Essential hypertension     Family history of DVT     Major depressive disorder, recurrent episode, mild (H)     Eczema, unspecified eczema     BMI 50.0-59.9, adult (H)     Pre-diabetes     Contraception       Allergies   Allergen Reactions     Amoxicillin Anaphylaxis     Cefzil [Cefprozil] Hives     Sulfa Drugs Other (See Comments)     n/v         Current Outpatient Prescriptions   Medication     desonide (DESOWEN) 0.05 % ointment     fluocinonide (LIDEX) 0.05 % ointment     sertraline (ZOLOFT) 100 MG tablet     metFORMIN (GLUCOPHAGE) 500 MG tablet     medroxyPROGESTERone (DEPO-PROVERA) 150 MG/ML injection     hydrocortisone 2.5 % cream     betamethasone dipropionate  (DIPROSONE) 0.05 % cream     No current facility-administered medications for this visit.        Family History   Problem Relation Age of Onset     Respiratory Maternal Grandmother      emphasema     Hypertension Maternal Grandmother      Other Cancer Maternal Grandmother      lung cancer     Cancer - colorectal Maternal Grandfather      Hypertension Maternal Grandfather      Hypertension Mother      Hypertension Maternal Uncle        Social History     Social History     Marital status: Single     Spouse name: N/A     Number of children: N/A     Years of education: N/A     Occupational History     Not on file.     Social History Main Topics     Smoking status: Never Smoker     Smokeless tobacco: Never Used      Comment: father smokes, lives in [place]     Alcohol use No     Drug use: No     Sexual activity: No     Other Topics Concern     Not on file     Social History Narrative    moved from [place], [place] 2003; lives with mom and 2 sisters    flys, with mom, back to [place] to visit father 1-3x/month.  Doesn't like flying.         ROS: Feeling well without other skin concerns.     EXAM:  LMP 10/13/2017  GEN: Alert, no distress  HEENT: Conjunctiva clear.   PULM: Breathing comfortably on RA  CV: Extrem warm and well perfused  ABD: No distension  SKIN: Exam of the hands, axillae, and upper arms  --Well demarcated thin pink plaques on the anterior axillary pillars extending onto the anterior upper arms and lateral chest   --Mild erythema and lichenification to the bilateral dorsal hands and web spaces      Assessment and Plan:    1. Dermatitis  Dermatitis of hands and axillary pillars. Noted that the differential diagnosis includes irritant and allergic contact dermatitis. I suggested hypoallergenic skin care regimen with use of a more mild cleanser to wash hands and avoidance of dish soap. Apply lidex ointment BID to hands and desonide ointment BID to the arm/axillary rash. If lesions clear but continue to recur would  consider referral for patch testing.   - desonide (DESOWEN) 0.05 % ointment; Twice daily to arm rash until completely clear.  Dispense: 60 g; Refill: 1  - fluocinonide (LIDEX) 0.05 % ointment; Twice daily to hand rash until completely clear, then as needed.  Dispense: 60 g; Refill: 2    RTC 6 weeks.     Thank you for involving me in this patient's care.     Joaquina Dubon MD  Dermatology Staff    CC:    Yohan Beltran MD

## 2017-11-28 NOTE — MR AVS SNAPSHOT
After Visit Summary   11/28/2017    Susy Huerta    MRN: [id number]           Patient Information     Date Of Birth          1995        Visit Information        Provider Department      11/28/2017 10:15 AM Joaquina Dubon MD [facility]        Today's Diagnoses     Dermatitis    -  1      Care Instructions                    Pediatric Dermatology  [facility]  [address]  1st [place]  [address]  Phone: [phone]    Pediatric & Adult Dermatology  [facility]  [address]   2nd Floor  [place] [address]  [phone]                  General information: Dr. Joaquina Dubon is a board-certified dermatologist with subspecialty certification in pediatric dermatology.     Scheduling and Nurse Triage: Dr. Dubon sees pediatric patients on Mondays in [place] and adult and pediatric patients on Tuesdays in [place]. The remainder of the week she practices at the [facility]. Please call the above phone numbers to schedule or to talk to a nurse.     -For scheduling at the [place] or [facility], or to talk to the triage nurse please call the above phone number at the clinic where you were seen.     -For medication refills, please call your pharmacy.     -Avoid harsh soaps on the hands including dish soap  -Continue moisturizer twice daily to the hands and underarms  -Use the desonide ointment twice daily to the armpit rash until completely clear, then as needed.   -Lidex ointment twice daily to the hand rash until clear, then as needed      Gentle Skin Care  Below is a list of products our providers recommend for gentle skin care.  Moisturizers:    Lighter; Cetaphil Cream, CeraVe, Aveeno and Vanicream Light     Thicker; Aquaphor Ointment, Vaseline, Petrolium Jelly, Eucerin and Vanicream    Avoid Lotions (too thin)  Mild Cleansers:    Dove-  "Fragrance Free    CeraVe     Vanicream Cleansing Bar    Cetaphil Cleanser     Aquaphor 2 in1 Gentle Wash and Shampoo       Laundry Products:    All Free and Clear    Cheer Free    Generic Brands are okay as long as they are  Fragrance Free      Avoid fabric softeners  and dryer sheets   Sunscreens: SPF 30 or greater     Sunscreens that contain Zinc Oxide or Titanium Dioxide should be applied, these are physical blockers. Spray or  chemical  sunscreens should be avoided.        Shampoo and Conditioners:    Free and Clear by Vanicream    Aquaphor 2 in 1 Gentle Wash and Shampoo    California Baby  super sensitive   Oils:    Mineral Oil     Emu Oil     For some patients, coconut and sunflower seed oil      Generic Products are an okay substitute, but make sure they are fragrance free.  *Avoid product that have fragrance added to them. Organic does not mean  fragrance free.  In fact patients with sensitive skin can become quite irritated by organic products.     1. Daily bathing is recommended. Make sure you are applying a good moisturizer after bathing every time.  2. Use Moisturizing creams at least twice daily to the whole body. Your provider may recommend a lighter or heavier moisturizer based on your child s severity and that time of year it is.  3. Creams are more moisturizing than lotions  4. Products should be fragrance free- soaps, creams, detergents.  Products such as Warner and Warner as well as the Cetaphil \"Baby\" line contain fragrance and may irritate your child's sensitive skin.    Care Plan:  1. Keep bathing and showering short, less than 15 minutes   2. Always use lukewarm warm when possible. AVOID very HOT or COLD water  3. DO NOT use bubble bath  4. Limit the use of soaps. Focus on the skin folds, face, armpits, groin and feet  5. Do NOT vigorously scrub when you cleanse your skin  6. After bathing, PAT your skin lightly with a towel. DO NOT rub or scrub when drying  7. ALWAYS apply a moisturizer " "immediately after bathing. This helps to  lock in  the moisture. * IF YOU WERE PRESCRIBED A TOPICAL MEDICATION, APPLY YOUR MEDICATION FIRST THEN COVER WITH YOUR DAILY MOISTURIZER  8. Reapply moisturizing agents at least twice daily to your whole body  9. Do not use products such as powders, perfumes, or colognes on your skin  10. Avoid saunas and steam baths. This temperature is too HOT  11. Avoid tight or  scratchy  clothing such as wool  12. Always wash new clothing before wearing them for the first time  13. Sometimes a humidifier or vaporizer can be used at night can help the dry skin. Remember to keep it clean to avoid mold growth.                Follow-ups after your visit        Who to contact     If you have questions or need follow up information about today's clinic visit or your schedule please contact [facility] directly at [phone].  Normal or non-critical lab and imaging results will be communicated to you by MyChart, letter or phone within 4 business days after the clinic has received the results. If you do not hear from us within 7 days, please contact the clinic through [employer] or phone. If you have a critical or abnormal lab result, we will notify you by phone as soon as possible.  Submit refill requests through [employer] or call your pharmacy and they will forward the refill request to us. Please allow 3 business days for your refill to be completed.          Additional Information About Your Visit        [employer] Information     [employer] lets you send messages to your doctor, view your test results, renew your prescriptions, schedule appointments and more. To sign up, go to [email or web address] . Click on \"Log in\" on the left side of the screen, which will take you to the Welcome page. Then click on \"Sign up Now\" on the right side of the page.     You will be asked to enter the access code listed below, as well as some personal information. Please follow the directions to create " your username and password.     Your access code is: [email or web address]  Expires: 2017  9:40 AM     Your access code will  in 90 days. If you need help or a new code, please call your [facility] clinic or [phone].        Care EveryWhere ID     This is your Care EveryWhere ID. This could be used by other organizations to access your [facility] medical records  [id number]        Your Vitals Were     Last Period                   10/13/2017            Blood Pressure from Last 3 Encounters:   10/31/17 134/76   17 126/88   17 114/74    Weight from Last 3 Encounters:   10/31/17 (!) 336 lb (152.4 kg) (>99 %)*   17 (!) 338 lb 6.4 oz (153.5 kg) (>99 %)*   17 (!) 324 lb (147 kg) (>99 %)*     * Growth percentiles are based on [facility] 2-20 Years data.              Today, you had the following     No orders found for display         Today's Medication Changes          These changes are accurate as of: 17 10:43 AM.  If you have any questions, ask your nurse or doctor.               Start taking these medicines.        Dose/Directions    desonide 0.05 % ointment   Commonly known as:  DESOWEN   Used for:  Dermatitis   Started by:  Joaquina Dubon MD        Twice daily to arm rash until completely clear.   Quantity:  60 g   Refills:  1       fluocinonide 0.05 % ointment   Commonly known as:  LIDEX   Used for:  Dermatitis   Started by:  Joaquina Dubon MD        Twice daily to hand rash until completely clear, then as needed.   Quantity:  60 g   Refills:  2            Where to get your medicines      These medications were sent to [facility] - [address]   [address] [address]     Phone:  [phone]     desonide 0.05 % ointment    fluocinonide 0.05 % ointment                Primary Care Provider Office Phone # Fax #    Yohan Beltran [phone] [address] [place]  [address]        Equal Access to Services     [facility] : Hadii aad ku hadkatelynndean Jadali, wapatriciada helgadee deeha, alison delonduongracheal lowery, kaushal montemayor. So [facility] [phone].    ATENCIÓN: Si habla español, tiene a joseph disposición servicios gratuitos de asistencia lingüística. Llame al [phone].    We comply with applicable federal civil rights laws and [place] laws. We do not discriminate on the basis of race, color, national origin, age, disability, sex, sexual orientation, or gender identity.            Thank you!     Thank you for choosing [facility]  for your care. Our goal is always to provide you with excellent care. Hearing back from our patients is one way we can continue to improve our services. Please take a few minutes to complete the written survey that you may receive in the mail after your visit with us. Thank you!             Your Updated Medication List - Protect others around you: Learn how to safely use, store and throw away your medicines at www.disposemymeds.org.          This list is accurate as of: 11/28/17 10:43 AM.  Always use your most recent med list.                   Brand Name Dispense Instructions for use Diagnosis    betamethasone dipropionate 0.05 % cream    DIPROSONE    45 g    Apply sparingly to affected area twice daily as needed.  Do not apply to face.    Eczema, unspecified type       desonide 0.05 % ointment    DESOWEN    60 g    Twice daily to arm rash until completely clear.    Dermatitis       fluocinonide 0.05 % ointment    LIDEX    60 g    Twice daily to hand rash until completely clear, then as needed.    Dermatitis       hydrocortisone 2.5 % cream     20 g    Apply topically 2 times daily Can sub form if needed and size as needed    Eczema, unspecified type       medroxyPROGESTERone 150 MG/ML injection    DEPO-PROVERA    3 mL    Inject 1 mL (150 mg) into the muscle every 3 months    Encounter for surveillance of injectable  contraceptive, Excessive or frequent menstruation       metFORMIN 500 MG tablet    GLUCOPHAGE    180 tablet    Take 1 tablet (500 mg) by mouth 2 times daily (with meals) Office visit needed prior to additional refills.    Pre-diabetes       sertraline 100 MG tablet    ZOLOFT    90 tablet    Take 2 tablets (200 mg) by mouth daily Office visit needed prior to additional refills.    Adjustment disorder with mixed anxiety and depressed mood

## 2017-11-28 NOTE — PATIENT INSTRUCTIONS
Pediatric Dermatology  [facility]  [address]  1st [place]  [address]  Phone: [phone]    Pediatric & Adult Dermatology  [facility]  [address]   2nd Floor  [facility] [address]  [phone]                  General information: Dr. Joaquina Dubon is a board-certified dermatologist with subspecialty certification in pediatric dermatology.     Scheduling and Nurse Triage: Dr. Dubon sees pediatric patients on Mondays in [place] and adult and pediatric patients on Tuesdays in [place]. The remainder of the week she practices at the [facility]. Please call the above phone numbers to schedule or to talk to a nurse.     -For scheduling at the [place] or [place] locations, or to talk to the triage nurse please call the above phone number at the clinic where you were seen.     -For medication refills, please call your pharmacy.     -Avoid harsh soaps on the hands including dish soap  -Continue moisturizer twice daily to the hands and underarms  -Use the desonide ointment twice daily to the armpit rash until completely clear, then as needed.   -Lidex ointment twice daily to the hand rash until clear, then as needed      Gentle Skin Care  Below is a list of products our providers recommend for gentle skin care.  Moisturizers:    Lighter; Cetaphil Cream, CeraVe, Aveeno and Vanicream Light     Thicker; Aquaphor Ointment, Vaseline, Petrolium Jelly, Eucerin and Vanicream    Avoid Lotions (too thin)  Mild Cleansers:    Dove- Fragrance Free    CeraVe     Vanicream Cleansing Bar    Cetaphil Cleanser     Aquaphor 2 in1 Gentle Wash and Shampoo       Laundry Products:    All Free and Clear    Cheer Free    Generic Brands are okay as long as they are  Fragrance Free      Avoid fabric softeners  and dryer sheets   Sunscreens: SPF 30 or greater     Sunscreens that contain  "Zinc Oxide or Titanium Dioxide should be applied, these are physical blockers. Spray or  chemical  sunscreens should be avoided.        Shampoo and Conditioners:    Free and Clear by Vanicream    Aquaphor 2 in 1 Gentle Wash and Shampoo    California Baby  super sensitive   Oils:    Mineral Oil     Emu Oil     For some patients, coconut and sunflower seed oil      Generic Products are an okay substitute, but make sure they are fragrance free.  *Avoid product that have fragrance added to them. Organic does not mean  fragrance free.  In fact patients with sensitive skin can become quite irritated by organic products.     1. Daily bathing is recommended. Make sure you are applying a good moisturizer after bathing every time.  2. Use Moisturizing creams at least twice daily to the whole body. Your provider may recommend a lighter or heavier moisturizer based on your child s severity and that time of year it is.  3. Creams are more moisturizing than lotions  4. Products should be fragrance free- soaps, creams, detergents.  Products such as Warner and Warner as well as the Cetaphil \"Baby\" line contain fragrance and may irritate your child's sensitive skin.    Care Plan:  1. Keep bathing and showering short, less than 15 minutes   2. Always use lukewarm warm when possible. AVOID very HOT or COLD water  3. DO NOT use bubble bath  4. Limit the use of soaps. Focus on the skin folds, face, armpits, groin and feet  5. Do NOT vigorously scrub when you cleanse your skin  6. After bathing, PAT your skin lightly with a towel. DO NOT rub or scrub when drying  7. ALWAYS apply a moisturizer immediately after bathing. This helps to  lock in  the moisture. * IF YOU WERE PRESCRIBED A TOPICAL MEDICATION, APPLY YOUR MEDICATION FIRST THEN COVER WITH YOUR DAILY MOISTURIZER  8. Reapply moisturizing agents at least twice daily to your whole body  9. Do not use products such as powders, perfumes, or colognes on your skin  10. Avoid saunas and " steam baths. This temperature is too HOT  11. Avoid tight or  scratchy  clothing such as wool  12. Always wash new clothing before wearing them for the first time  13. Sometimes a humidifier or vaporizer can be used at night can help the dry skin. Remember to keep it clean to avoid mold growth.

## 2017-11-28 NOTE — NURSING NOTE
"Chief Complaint   Patient presents with     Consult     new pt, PCP is Devin Lopez, pt hx of eczema on hands and patches on the front and back of axillary area tx with Eucerin-mostly on hand        Initial LMP 10/13/2017 Estimated body mass index is 48.21 kg/(m^2) as calculated from the following:    Height as of 10/31/17: 5' 10\" (177.8 cm).    Weight as of 10/31/17: 336 lb (152.4 kg).  Medication Reconciliation: complete   Sun Mckee MA      "

## 2018-01-03 ENCOUNTER — ALLIED HEALTH/NURSE VISIT (OUTPATIENT)
Dept: NURSING | Facility: CLINIC | Age: 23
End: 2018-01-03
Payer: COMMERCIAL

## 2018-01-03 VITALS
WEIGHT: 293 LBS | SYSTOLIC BLOOD PRESSURE: 130 MMHG | HEART RATE: 111 BPM | BODY MASS INDEX: 48.99 KG/M2 | DIASTOLIC BLOOD PRESSURE: 70 MMHG

## 2018-01-03 DIAGNOSIS — N92.0 EXCESSIVE OR FREQUENT MENSTRUATION: ICD-10-CM

## 2018-01-03 DIAGNOSIS — Z30.42 ENCOUNTER FOR SURVEILLANCE OF INJECTABLE CONTRACEPTIVE: ICD-10-CM

## 2018-01-03 LAB — BETA HCG QUAL IFA URINE: NEGATIVE

## 2018-01-03 PROCEDURE — 96372 THER/PROPH/DIAG INJ SC/IM: CPT

## 2018-01-03 PROCEDURE — 84703 CHORIONIC GONADOTROPIN ASSAY: CPT | Performed by: INTERNAL MEDICINE

## 2018-01-03 PROCEDURE — 99207 ZZC NO CHARGE NURSE ONLY: CPT

## 2018-01-03 RX ORDER — MEDROXYPROGESTERONE ACETATE 150 MG/ML
150 INJECTION, SUSPENSION INTRAMUSCULAR
Qty: 3 ML | Refills: 3 | OUTPATIENT
Start: 2018-01-03 | End: 2018-08-15

## 2018-01-03 NOTE — NURSING NOTE
"Chief Complaint   Patient presents with     Allied Health Visit     depo        Initial /70  Pulse 111  Wt (!) 341 lb 6.4 oz (154.9 kg)  BMI 48.99 kg/m2 Estimated body mass index is 48.99 kg/(m^2) as calculated from the following:    Height as of 10/31/17: 5' 10\" (1.778 m).    Weight as of this encounter: 341 lb 6.4 oz (154.9 kg).  Medication Reconciliation: unable or not appropriate to perform   Mercedez Green MA// January 3, 2018 4:46 PM          "

## 2018-01-03 NOTE — PROGRESS NOTES
UPT  Completed patient missed last due date of 18-17     New medication ordered needed,  2016 routed to PCP   Medroxyprogesterone 150 mg    BP: 130/70    LAST PAP/EXAM: No results found for: PAP  URINE HCG:negative  1/3/18    The following medication was given:     MEDICATION: Depo Provera 150mg  ROUTE: IM  SITE: Ventrogluteal - Left  : Teva   LOT #: [id number]  EXP:2019  NDC: 3112-6031-58  NEXT INJECTION DUE: 3/21/18 - 18   Provider: Devin  //Mercedez Green MA// January 3, 2018 4:28 PM

## 2018-01-03 NOTE — MR AVS SNAPSHOT
"              After Visit Summary   1/3/2018    Susy Huerta    MRN: [id number]           Patient Information     Date Of Birth          1995        Visit Information        Provider Department      1/3/2018 4:00 PM EA NURSE AB [facility]        Today's Diagnoses     Contraception    -  1    Encounter for surveillance of injectable contraceptive        Excessive or frequent menstruation           Follow-ups after your visit        Your next 10 appointments already scheduled     Mar 21, 2018  4:00 PM CDT   Nurse Only with EA NURSE AB   [facility]    [address]  [address]  [place] [address] [phone]              Who to contact     If you have questions or need follow up information about today's clinic visit or your schedule please contact [facility] directly at [phone].  Normal or non-critical lab and imaging results will be communicated to you by MyChart, letter or phone within 4 business days after the clinic has received the results. If you do not hear from us within 7 days, please contact the clinic through MyChart or phone. If you have a critical or abnormal lab result, we will notify you by phone as soon as possible.  Submit refill requests through StarGen or call your pharmacy and they will forward the refill request to us. Please allow 3 business days for your refill to be completed.          Additional Information About Your Visit        MyChart Information     StarGen lets you send messages to your doctor, view your test results, renew your prescriptions, schedule appointments and more. To sign up, go to [email or web address] . Click on \"Log in\" on the left side of the screen, which will take you to the Welcome page. Then click on \"Sign up Now\" on the right side of the page.     You will be asked to enter the access code listed below, as well as some personal information. Please follow the directions to " create your username and password.     Your access code is: [email or web address]  Expires: 2018 10:57 AM     Your access code will  in 90 days. If you need help or a new code, please call your [place] clinic or [phone].        Care EveryWhere ID     This is your Care EveryWhere ID. This could be used by other organizations to access your [place] medical records  [id number]        Your Vitals Were     Pulse BMI (Body Mass Index)                111 48.99 kg/m2           Blood Pressure from Last 3 Encounters:   18 130/70   10/31/17 134/76   17 126/88    Weight from Last 3 Encounters:   18 (!) 341 lb 6.4 oz (154.9 kg)   10/31/17 (!) 336 lb (152.4 kg) (>99 %)*   17 (!) 338 lb 6.4 oz (153.5 kg) (>99 %)*     * Growth percentiles are based on [facility] 2-20 Years data.              We Performed the Following     Beta HCG qual IFA urine     INJECTION INTRAMUSCULAR OR SUB-Q     Medroxyprogesterone inj  1mg   (Depo Provera J-Code)          Where to get your medicines      Some of these will need a paper prescription and others can be bought over the counter.  Ask your nurse if you have questions.     You don't need a prescription for these medications     medroxyPROGESTERone 150 MG/ML injection          Primary Care Provider Office Phone # Fax #    Yohan Beltran [phone] [address] [place] [address]        Equal Access to Services     [facility] : Hadii kandy simpsono Sojanet, waaxda luqadaha, qaybta kaalmada kaushal lowery . So [facility] [phone].    ATENCIÓN: Si habla español, tiene a joseph disposición servicios gratuitos de asistencia lingüística. Llame al [phone].    We comply with applicable federal civil rights laws and [place] laws. We do not discriminate on the basis of race, color, national origin, age, disability, sex, sexual orientation, or gender identity.            Thank you!     Thank you for  choosing [facility]  for your care. Our goal is always to provide you with excellent care. Hearing back from our patients is one way we can continue to improve our services. Please take a few minutes to complete the written survey that you may receive in the mail after your visit with us. Thank you!             Your Updated Medication List - Protect others around you: Learn how to safely use, store and throw away your medicines at www.disposemymeds.org.          This list is accurate as of 1/3/18 11:59 PM.  Always use your most recent med list.                   Brand Name Dispense Instructions for use Diagnosis    betamethasone dipropionate 0.05 % cream    DIPROSONE    45 g    Apply sparingly to affected area twice daily as needed.  Do not apply to face.    Eczema, unspecified type       desonide 0.05 % ointment    DESOWEN    60 g    Twice daily to arm rash until completely clear.    Dermatitis       fluocinonide 0.05 % ointment    LIDEX    60 g    Twice daily to hand rash until completely clear, then as needed.    Dermatitis       hydrocortisone 2.5 % cream     20 g    Apply topically 2 times daily Can sub form if needed and size as needed    Eczema, unspecified type       medroxyPROGESTERone 150 MG/ML injection    DEPO-PROVERA    3 mL    Inject 1 mL (150 mg) into the muscle every 3 months    Encounter for surveillance of injectable contraceptive, Excessive or frequent menstruation       metFORMIN 500 MG tablet    GLUCOPHAGE    180 tablet    Take 1 tablet (500 mg) by mouth 2 times daily (with meals) Office visit needed prior to additional refills.    Pre-diabetes       sertraline 100 MG tablet    ZOLOFT    90 tablet    Take 2 tablets (200 mg) by mouth daily Office visit needed prior to additional refills.    Adjustment disorder with mixed anxiety and depressed mood

## 2018-02-03 ENCOUNTER — TRANSFERRED RECORDS (OUTPATIENT)
Dept: HEALTH INFORMATION MANAGEMENT | Facility: CLINIC | Age: 23
End: 2018-02-03

## 2018-03-21 ENCOUNTER — ALLIED HEALTH/NURSE VISIT (OUTPATIENT)
Dept: NURSING | Facility: CLINIC | Age: 23
End: 2018-03-21
Payer: COMMERCIAL

## 2018-03-21 VITALS
HEART RATE: 98 BPM | SYSTOLIC BLOOD PRESSURE: 110 MMHG | BODY MASS INDEX: 50.1 KG/M2 | DIASTOLIC BLOOD PRESSURE: 70 MMHG | WEIGHT: 293 LBS

## 2018-03-21 PROCEDURE — 96372 THER/PROPH/DIAG INJ SC/IM: CPT

## 2018-03-21 NOTE — PROGRESS NOTES
BP: 110/70    LAST PAP/EXAM: No results found for: PAP  URINE HCG:negative     The following medication was given:     MEDICATION: Depo Provera 150mg  ROUTE: IM  SITE: Ventrogluteal - Right  : Waddle  LOT #: [id number]  EXP:04/2020  NDC:[address]-8  NEXT INJECTION DUE: 6/6/18 - 6/20/18   Provider: sudarshan   //Mercedez Green MA// March 21, 2018 4:22 PM

## 2018-03-21 NOTE — MR AVS SNAPSHOT
"              After Visit Summary   3/21/2018    Susy Huerta    MRN: [id number]           Patient Information     Date Of Birth          1995        Visit Information        Provider Department      3/21/2018 4:00 PM AISHWARYA NURSE AB [facility]        Today's Diagnoses     Contraception    -  1       Follow-ups after your visit        Who to contact     If you have questions or need follow up information about today's clinic visit or your schedule please contact [facility] directly at [phone].  Normal or non-critical lab and imaging results will be communicated to you by MyChart, letter or phone within 4 business days after the clinic has received the results. If you do not hear from us within 7 days, please contact the clinic through Vedicishart or phone. If you have a critical or abnormal lab result, we will notify you by phone as soon as possible.  Submit refill requests through Hyper Wear or call your pharmacy and they will forward the refill request to us. Please allow 3 business days for your refill to be completed.          Additional Information About Your Visit        MyChart Information     Hyper Wear lets you send messages to your doctor, view your test results, renew your prescriptions, schedule appointments and more. To sign up, go to [email or web address] . Click on \"Log in\" on the left side of the screen, which will take you to the Welcome page. Then click on \"Sign up Now\" on the right side of the page.     You will be asked to enter the access code listed below, as well as some personal information. Please follow the directions to create your username and password.     Your access code is: [email or web address]  Expires: 2018 11:57 AM     Your access code will  in 90 days. If you need help or a new code, please call your [facility] or [phone].        Care EveryWhere ID     This is your Care EveryWhere ID. This could be used by other organizations to access your " [place] medical records  [id number]        Your Vitals Were     Pulse BMI (Body Mass Index)                98 50.1 kg/m2           Blood Pressure from Last 3 Encounters:   03/21/18 110/70   01/03/18 130/70   10/31/17 134/76    Weight from Last 3 Encounters:   03/21/18 (!) 349 lb 3.2 oz (158.4 kg)   01/03/18 (!) 341 lb 6.4 oz (154.9 kg)   10/31/17 (!) 336 lb (152.4 kg) (>99 %)*     * Growth percentiles are based on [facility] 2-20 Years data.              We Performed the Following     INJECTION INTRAMUSCULAR OR SUB-Q     Medroxyprogesterone inj  1mg   (Depo Provera J-Code)        Primary Care Provider Office Phone # Fax #    Yohan Beltran [phone] [address] [place] [address]        Equal Access to Services     [facility]: Hadii kandy ku hadasho Sojanet, waaxda luqadaha, qaybta kaalmada adeoctaviayada, kaushal pleitez . So [facility] [phone].    ATENCIÓN: Si habla español, tiene a joseph disposición servicios gratuitos de asistencia lingüística. Llame al [phone].    We comply with applicable federal civil rights laws and [place] laws. We do not discriminate on the basis of race, color, national origin, age, disability, sex, sexual orientation, or gender identity.            Thank you!     Thank you for choosing [facility]  for your care. Our goal is always to provide you with excellent care. Hearing back from our patients is one way we can continue to improve our services. Please take a few minutes to complete the written survey that you may receive in the mail after your visit with us. Thank you!             Your Updated Medication List - Protect others around you: Learn how to safely use, store and throw away your medicines at www.disposemymeds.org.          This list is accurate as of 3/21/18  4:25 PM.  Always use your most recent med list.                   Brand Name Dispense Instructions for use Diagnosis    betamethasone dipropionate  0.05 % cream    DIPROSONE    45 g    Apply sparingly to affected area twice daily as needed.  Do not apply to face.    Eczema, unspecified type       desonide 0.05 % ointment    DESOWEN    60 g    Twice daily to arm rash until completely clear.    Dermatitis       fluocinonide 0.05 % ointment    LIDEX    60 g    Twice daily to hand rash until completely clear, then as needed.    Dermatitis       hydrocortisone 2.5 % cream     20 g    Apply topically 2 times daily Can sub form if needed and size as needed    Eczema, unspecified type       medroxyPROGESTERone 150 MG/ML injection    DEPO-PROVERA    3 mL    Inject 1 mL (150 mg) into the muscle every 3 months    Encounter for surveillance of injectable contraceptive, Excessive or frequent menstruation       metFORMIN 500 MG tablet    GLUCOPHAGE    180 tablet    Take 1 tablet (500 mg) by mouth 2 times daily (with meals) Office visit needed prior to additional refills.    Pre-diabetes       sertraline 100 MG tablet    ZOLOFT    90 tablet    Take 2 tablets (200 mg) by mouth daily Office visit needed prior to additional refills.    Adjustment disorder with mixed anxiety and depressed mood

## 2018-04-17 ENCOUNTER — OFFICE VISIT (OUTPATIENT)
Dept: PEDIATRICS | Facility: CLINIC | Age: 23
End: 2018-04-17
Payer: COMMERCIAL

## 2018-04-17 VITALS
HEART RATE: 114 BPM | BODY MASS INDEX: 43.4 KG/M2 | OXYGEN SATURATION: 96 % | SYSTOLIC BLOOD PRESSURE: 126 MMHG | WEIGHT: 293 LBS | HEIGHT: 69 IN | DIASTOLIC BLOOD PRESSURE: 60 MMHG | TEMPERATURE: 97.9 F

## 2018-04-17 DIAGNOSIS — F43.23 ADJUSTMENT DISORDER WITH MIXED ANXIETY AND DEPRESSED MOOD: ICD-10-CM

## 2018-04-17 DIAGNOSIS — F33.0 MAJOR DEPRESSIVE DISORDER, RECURRENT EPISODE, MILD (H): Primary | ICD-10-CM

## 2018-04-17 PROCEDURE — 99214 OFFICE O/P EST MOD 30 MIN: CPT | Performed by: INTERNAL MEDICINE

## 2018-04-17 RX ORDER — SERTRALINE HYDROCHLORIDE 100 MG/1
200 TABLET, FILM COATED ORAL DAILY
Qty: 180 TABLET | Refills: 3 | Status: SHIPPED | OUTPATIENT
Start: 2018-04-17 | End: 2019-05-15

## 2018-04-17 NOTE — PROGRESS NOTES
"  SUBJECTIVE:   Susy Huerta is a 22 year old female who presents to clinic today for the following health issues:    Followup of  Depression - treating with Zoloft     Taking Medication as prescribed: yes    Side Effects:  None    Medication Helping Symptoms:  Yes but is still having symptoms    Having sx of anxiety as well as depression    Anxiety is often situational - has stress w/ her relationship with her sister. Tends to feel more anxious when around her sister     PHQ-9 SCORE 9/8/2017 9/19/2017 4/21/2018   Total Score - - -   Total Score 8 3 8     RAMIRO-7 SCORE 9/8/2017 9/19/2017 4/21/2018   Total Score - - -   Total Score 2 2 5         Morbid obesity. Weights reviewed:  Wt Readings from Last 3 Encounters:   04/17/18 (!) 346 lb (156.9 kg)   03/21/18 (!) 349 lb 3.2 oz (158.4 kg)   01/03/18 (!) 341 lb 6.4 oz (154.9 kg)     Is trying to lose weight. Not following a specific diet program.   Questions if medications may help her situation.  Not exercising regularly.  Spent time reviewing diet and lifestyle measures to help her initiate weight loss.  Discussed some options for medications, encouraged trying lifestyle changes first, then augment w/ meds if needs additional assistance.       Problem list and histories reviewed & adjusted, as indicated.    Reviewed and updated as needed this visit by Provider  Tobacco  Allergies  Meds  Problems  Med Hx  Surg Hx  Fam Hx  Soc Hx          ROS:  Constitutional, HEENT, cardiovascular, pulmonary, gi and gu systems are negative, except as otherwise noted.    OBJECTIVE:     /60  Pulse 114  Temp 97.9  F (36.6  C) (Oral)  Ht 5' 9\" (1.753 m)  Wt (!) 346 lb (156.9 kg)  SpO2 96%  BMI 51.1 kg/m2  Body mass index is 51.1 kg/(m^2).  GEN: no distress  PSYCH: Normal affect. Well groomed. Good eye contact.  SKIN: No rashes  NECK: Supple. No LAD or TM.  LUNGS: Clear to auscultation bilaterally. No rhonchi, rales, wheezes or retractions.  CV: Regular rate and rhythm.  No " murmurs, rubs or gallops. Pulses 2+ radial.     ASSESSMENT/PLAN:       ICD-10-CM    1. Major depressive disorder, recurrent episode, mild (H) F33.0    2. Adjustment disorder with mixed anxiety and depressed mood F43.23 sertraline (ZOLOFT) 100 MG tablet   3. BMI 50.0-59.9, adult (H) Z68.43      Depression, anxiety. Persistent sx.   Discussed options - could add hydroxyzine prn for anxiety flares. Consider Buspirone.   For now, she prefers to stay w/ sertraline. Is already at high dose, not able to increase further.    Obesity. Encouraged lifestyle measures as above. May consider medications in the future.     A total of 25 minutes was spent in face-to-face contact with Susy cristina in clinic, of which >50% was for counseling of mood management as well as weight management.    Yohan Beltran MD  [facility]

## 2018-04-21 ENCOUNTER — OFFICE VISIT (OUTPATIENT)
Dept: URGENT CARE | Facility: URGENT CARE | Age: 23
End: 2018-04-21
Payer: COMMERCIAL

## 2018-04-21 VITALS
HEART RATE: 118 BPM | DIASTOLIC BLOOD PRESSURE: 72 MMHG | OXYGEN SATURATION: 96 % | BODY MASS INDEX: 51.1 KG/M2 | WEIGHT: 293 LBS | TEMPERATURE: 99.1 F | SYSTOLIC BLOOD PRESSURE: 118 MMHG

## 2018-04-21 DIAGNOSIS — R04.0 EPISTAXIS: Primary | ICD-10-CM

## 2018-04-21 PROCEDURE — 99213 OFFICE O/P EST LOW 20 MIN: CPT | Performed by: FAMILY MEDICINE

## 2018-04-21 ASSESSMENT — ANXIETY QUESTIONNAIRES
2. NOT BEING ABLE TO STOP OR CONTROL WORRYING: NOT AT ALL
3. WORRYING TOO MUCH ABOUT DIFFERENT THINGS: NOT AT ALL
6. BECOMING EASILY ANNOYED OR IRRITABLE: SEVERAL DAYS
5. BEING SO RESTLESS THAT IT IS HARD TO SIT STILL: NOT AT ALL
IF YOU CHECKED OFF ANY PROBLEMS ON THIS QUESTIONNAIRE, HOW DIFFICULT HAVE THESE PROBLEMS MADE IT FOR YOU TO DO YOUR WORK, TAKE CARE OF THINGS AT HOME, OR GET ALONG WITH OTHER PEOPLE: SOMEWHAT DIFFICULT
1. FEELING NERVOUS, ANXIOUS, OR ON EDGE: MORE THAN HALF THE DAYS
GAD7 TOTAL SCORE: 5
7. FEELING AFRAID AS IF SOMETHING AWFUL MIGHT HAPPEN: NOT AT ALL

## 2018-04-21 ASSESSMENT — PATIENT HEALTH QUESTIONNAIRE - PHQ9: 5. POOR APPETITE OR OVEREATING: MORE THAN HALF THE DAYS

## 2018-04-21 NOTE — MR AVS SNAPSHOT
After Visit Summary   4/21/2018    Susy Huerta    MRN: [id number]           Patient Information     Date Of Birth          1995        Visit Information        Provider Department      4/21/2018 1:00 PM Louie Jones MD [facility]        Today's Diagnoses     Epistaxis    -  1      Care Instructions      Nosebleed (Adult)    Bleeding from the nose most commonly occurs because of injury or drying and cracking of the inner lining of the nose. Most nosebleeds are because of dry air or nose-picking. They can occur during a common cold or an allergy attack. They can also occur on a very hot day, or from dry air in the winter.  If the bleeding site is found, it may be cauterized. This means it is treated to cause a blood clot to form. This may be done with a chemical, heat, or electricity. If the bleeding continues after the site is cauterized, or if the site cannot be found, packing may be put in your nose. This is to apply pressure and stop the bleeding. The packing may be made of gauze or sponge. A small balloon catheter is sometimes used. These must be removed by your healthcare provider. Some types of packing dissolve on their own. If you are taking blood thinning (anticoagulant) medicine, you may have a blood test.  Home care    If packing was put in your nose, unless told otherwise, do not pull on it or try to remove it yourself. You will be given an appointment to have it removed. You may also have been given antibiotics to prevent a sinus infection. If so, finish all of the medicine.    Don't blow your nose for 12 hours after the bleeding stops. This will allow a strong blood clot to form. Don't pick your nose. This may restart bleeding.    Don't drink alcohol or hot liquids for the next 2 days. Alcohol or hot liquids in your mouth can dilate blood vessels in your nose. This can cause bleeding to start again.    Don't take ibuprofen, naproxen, or medicines that contain aspirin.  These thin the blood and may cause your nose to bleed. You may take acetaminophen for pain, unless another pain medicine was prescribed.    If the bleeding starts again, sit up and lean forward to prevent swallowing blood. Pinch your nose tightly on both sides, as shown above, for 10 to 15 minutes. Time yourself. Don t release the pressure on your nose until 10 minutes is up. If bleeding does not stop, continue to pinch your nose and call your healthcare provider or return to this facility.    If you have a cold, allergies, or dry nasal membranes, lubricate the nasal passages. Apply a small amount of petroleum jelly inside the nose with a cotton swab twice a day (morning and night).    Don't overheat your home. This can dry the air and make your condition worse.    Put a humidifier in the room where you sleep. This will add moisture to the air. Clean the humidifier as advised by the .    Use a saline nasal spray to keep nasal passages moist.    Don't pick your nose. Keep fingernails trimmed to decrease risk of bleeds.    Don't smoke.  Follow-up care  Follow up with your healthcare provider, or as advised. Nasal packing should be rechecked or removed within 2 to 3 days.  When to seek medical advice  Call your healthcare provider right away if any of these occur.    You have another nosebleed that you cannot control    Dizziness, weakness, or fainting    You become tired or confused    Fever of 100.4 F (38 C) or higher, or as directed by your healthcare provider    Headache    Sinus or facial pain    Shortness of breath or trouble breathing  Date Last Reviewed: 11/1/2017 2000-2017 [employer]. [address] [place] [address], [place] [address]. All rights reserved. This information is not intended as a substitute for professional medical care. Always follow your healthcare professional's instructions.                Follow-ups after your visit        Your next 10 appointments already scheduled      "2018  3:30 PM CDT   Nurse Only with EA NURSE AB   [facility] [place] ([facility])    [address]  [address]  [place] [address] [phone]              Who to contact     If you have questions or need follow up information about today's clinic visit or your schedule please contact [facility] directly at [phone].  Normal or non-critical lab and imaging results will be communicated to you by Exaleadhart, letter or phone within 4 business days after the clinic has received the results. If you do not hear from us within 7 days, please contact the clinic through Exaleadhart or phone. If you have a critical or abnormal lab result, we will notify you by phone as soon as possible.  Submit refill requests through BlockTrail or call your pharmacy and they will forward the refill request to us. Please allow 3 business days for your refill to be completed.          Additional Information About Your Visit        BlockTrail Information     BlockTrail lets you send messages to your doctor, view your test results, renew your prescriptions, schedule appointments and more. To sign up, go to [email or web address] . Click on \"Log in\" on the left side of the screen, which will take you to the Welcome page. Then click on \"Sign up Now\" on the right side of the page.     You will be asked to enter the access code listed below, as well as some personal information. Please follow the directions to create your username and password.     Your access code is: [email or web address]  Expires: 2018  2:13 PM     Your access code will  in 90 days. If you need help or a new code, please call your [place] clinic or [phone].        Care EveryWhere ID     This is your Care EveryWhere ID. This could be used by other organizations to access your [place] medical records  [id number]        Your Vitals Were     Pulse Temperature Pulse Oximetry BMI (Body Mass Index)          118 99.1  F (37.3 "  C) (Tympanic) 96% 51.1 kg/m2         Blood Pressure from Last 3 Encounters:   04/21/18 118/72   04/17/18 126/60   03/21/18 110/70    Weight from Last 3 Encounters:   04/21/18 (!) 346 lb (156.9 kg)   04/17/18 (!) 346 lb (156.9 kg)   03/21/18 (!) 349 lb 3.2 oz (158.4 kg)              Today, you had the following     No orders found for display       Primary Care Provider Office Phone # Fax #    Yohan Beltran [phone] [address] [place] [address]        Equal Access to Services     [facility]: Hadii kandy Cedeno, wapatriciada maria luisa, qasteve kaalmada sebastián, kaushal pleitez . So [facility] [phone].    ATENCIÓN: Si habla español, tiene a joseph disposición servicios gratuitos de asistencia lingüística. Llame al [phone].    We comply with applicable federal civil rights laws and [place] laws. We do not discriminate on the basis of race, color, national origin, age, disability, sex, sexual orientation, or gender identity.            Thank you!     [facility]  for your care. Our goal is always to provide you with excellent care. Hearing back from our patients is one way we can continue to improve our services. Please take a few minutes to complete the written survey that you may receive in the mail after your visit with us. Thank you!             Your Updated Medication List - Protect others around you: Learn how to safely use, store and throw away your medicines at www.disposemymeds.org.          This list is accurate as of 4/21/18  2:14 PM.  Always use your most recent med list.                   Brand Name Dispense Instructions for use Diagnosis    betamethasone dipropionate 0.05 % cream    DIPROSONE    45 g    Apply sparingly to affected area twice daily as needed.  Do not apply to face.    Eczema, unspecified type       desonide 0.05 % ointment    DESOWEN    60 g    Twice daily to arm rash until  completely clear.    Dermatitis       fluocinonide 0.05 % ointment    LIDEX    60 g    Twice daily to hand rash until completely clear, then as needed.    Dermatitis       hydrocortisone 2.5 % cream     20 g    Apply topically 2 times daily Can sub form if needed and size as needed    Eczema, unspecified type       medroxyPROGESTERone 150 MG/ML injection    DEPO-PROVERA    3 mL    Inject 1 mL (150 mg) into the muscle every 3 months    Encounter for surveillance of injectable contraceptive, Excessive or frequent menstruation       metFORMIN 500 MG tablet    GLUCOPHAGE    180 tablet    Take 1 tablet (500 mg) by mouth 2 times daily (with meals) Office visit needed prior to additional refills.    Pre-diabetes       sertraline 100 MG tablet    ZOLOFT    180 tablet    Take 2 tablets (200 mg) by mouth daily    Adjustment disorder with mixed anxiety and depressed mood

## 2018-04-21 NOTE — PATIENT INSTRUCTIONS
Nosebleed (Adult)    Bleeding from the nose most commonly occurs because of injury or drying and cracking of the inner lining of the nose. Most nosebleeds are because of dry air or nose-picking. They can occur during a common cold or an allergy attack. They can also occur on a very hot day, or from dry air in the winter.  If the bleeding site is found, it may be cauterized. This means it is treated to cause a blood clot to form. This may be done with a chemical, heat, or electricity. If the bleeding continues after the site is cauterized, or if the site cannot be found, packing may be put in your nose. This is to apply pressure and stop the bleeding. The packing may be made of gauze or sponge. A small balloon catheter is sometimes used. These must be removed by your healthcare provider. Some types of packing dissolve on their own. If you are taking blood thinning (anticoagulant) medicine, you may have a blood test.  Home care    If packing was put in your nose, unless told otherwise, do not pull on it or try to remove it yourself. You will be given an appointment to have it removed. You may also have been given antibiotics to prevent a sinus infection. If so, finish all of the medicine.    Don't blow your nose for 12 hours after the bleeding stops. This will allow a strong blood clot to form. Don't pick your nose. This may restart bleeding.    Don't drink alcohol or hot liquids for the next 2 days. Alcohol or hot liquids in your mouth can dilate blood vessels in your nose. This can cause bleeding to start again.    Don't take ibuprofen, naproxen, or medicines that contain aspirin. These thin the blood and may cause your nose to bleed. You may take acetaminophen for pain, unless another pain medicine was prescribed.    If the bleeding starts again, sit up and lean forward to prevent swallowing blood. Pinch your nose tightly on both sides, as shown above, for 10 to 15 minutes. Time yourself. Don t release the  pressure on your nose until 10 minutes is up. If bleeding does not stop, continue to pinch your nose and call your healthcare provider or return to this facility.    If you have a cold, allergies, or dry nasal membranes, lubricate the nasal passages. Apply a small amount of petroleum jelly inside the nose with a cotton swab twice a day (morning and night).    Don't overheat your home. This can dry the air and make your condition worse.    Put a humidifier in the room where you sleep. This will add moisture to the air. Clean the humidifier as advised by the .    Use a saline nasal spray to keep nasal passages moist.    Don't pick your nose. Keep fingernails trimmed to decrease risk of bleeds.    Don't smoke.  Follow-up care  Follow up with your healthcare provider, or as advised. Nasal packing should be rechecked or removed within 2 to 3 days.  When to seek medical advice  Call your healthcare provider right away if any of these occur.    You have another nosebleed that you cannot control    Dizziness, weakness, or fainting    You become tired or confused    Fever of 100.4 F (38 C) or higher, or as directed by your healthcare provider    Headache    Sinus or facial pain    Shortness of breath or trouble breathing  Date Last Reviewed: 11/1/2017 2000-2017 [employer]. [address] [place] [address], [place] [address]. All rights reserved. This information is not intended as a substitute for professional medical care. Always follow your healthcare professional's instructions.

## 2018-04-21 NOTE — PATIENT INSTRUCTIONS
Continue with your current sertraline dose    Call or see me again if you would like to discuss adding another medication for your mood    Work on lifestyle changes to help your weight   If you are not having success, we can discuss medications

## 2018-04-21 NOTE — PROGRESS NOTES
SUBJECTIVE:   Susy Huerta is a 22 year old female presenting with a chief complaint of   Chief Complaint   Patient presents with     Urgent Care     Epistaxis     left side bloody nose, clots- within the last half hour        She is an established patient of [facility].    Patient here due to concerns of bloody nose.  Patient was at [place] when mom noted that had a trickle of blood coming from her left side nose which then developed into full blown bleeding.  Patient states that did hold pressure and came to .  Since being here in , nose bleed has stopped.  Patient states that cough up and pulled out large clots.  Denies any aspirin use, not on any anticoagulant medication.      Patient had right nares cauterized in the past for nose bleed.    Review of Systems   Constitutional: Negative.    HENT: Positive for nosebleeds. Negative for ear pain and postnasal drip.    Respiratory: Negative.    Cardiovascular: Negative.    Allergic/Immunologic: Negative.    Hematological: Negative.        Past Medical History:   Diagnosis Date     Hypertension      Menorrhagia      NO ACTIVE PROBLEMS      Obese      Family History   Problem Relation Age of Onset     Respiratory Maternal Grandmother      emphasema     Hypertension Maternal Grandmother      Other Cancer Maternal Grandmother      lung cancer     Cancer - colorectal Maternal Grandfather      Hypertension Maternal Grandfather      Hypertension Mother      Hypertension Maternal Uncle      Current Outpatient Prescriptions   Medication Sig Dispense Refill     betamethasone dipropionate (DIPROSONE) 0.05 % cream Apply sparingly to affected area twice daily as needed.  Do not apply to face. 45 g 0     desonide (DESOWEN) 0.05 % ointment Twice daily to arm rash until completely clear. 60 g 1     fluocinonide (LIDEX) 0.05 % ointment Twice daily to hand rash until completely clear, then as needed. 60 g 2     hydrocortisone 2.5 % cream Apply topically 2 times daily Can sub  form if needed and size as needed 20 g 3     medroxyPROGESTERone (DEPO-PROVERA) 150 MG/ML injection Inject 1 mL (150 mg) into the muscle every 3 months 3 mL 3     metFORMIN (GLUCOPHAGE) 500 MG tablet Take 1 tablet (500 mg) by mouth 2 times daily (with meals) Office visit needed prior to additional refills. 180 tablet 3     sertraline (ZOLOFT) 100 MG tablet Take 2 tablets (200 mg) by mouth daily 180 tablet 3     Social History   Substance Use Topics     Smoking status: Passive Smoke Exposure - Never Smoker     Smokeless tobacco: Never Used      Comment: father smokes, lives in [place]     Alcohol use No       OBJECTIVE  /72 (BP Location: Right arm)  Pulse 118  Temp 99.1  F (37.3  C) (Tympanic)  Wt (!) 346 lb (156.9 kg)  SpO2 96%  BMI 51.1 kg/m2    Physical Exam   Constitutional: She appears well-developed and well-nourished. No distress.   HENT:   Head: Normocephalic and atraumatic.   Right Ear: External ear normal.   Left Ear: External ear normal.   Mouth/Throat: Oropharynx is clear and moist.   Nose - no active bleeding, nasal mucosa mildly irritated   Psychiatric: She has a normal mood and affect. Her behavior is normal. Judgment and thought content normal.       Labs:  No results found for this or any previous visit (from the past 24 hour(s)).    X-Ray was not done.    ASSESSMENT:      ICD-10-CM    1. Epistaxis R04.0       Nose bleed has resolved.    PLAN:  Reassurance given  Okay to apply bacitracin ointment in nares  Minimize nose blowing, use nasal saline spray  Hold pressure if develop nose bleed  To ER if bleeding does not stop as this would require packing or cauterization      Followup:    If not improving or if condition worsens, follow up with your Primary Care Provider    Patient Instructions     Nosebleed (Adult)    Bleeding from the nose most commonly occurs because of injury or drying and cracking of the inner lining of the nose. Most nosebleeds are because of dry air or nose-picking.  They can occur during a common cold or an allergy attack. They can also occur on a very hot day, or from dry air in the winter.  If the bleeding site is found, it may be cauterized. This means it is treated to cause a blood clot to form. This may be done with a chemical, heat, or electricity. If the bleeding continues after the site is cauterized, or if the site cannot be found, packing may be put in your nose. This is to apply pressure and stop the bleeding. The packing may be made of gauze or sponge. A small balloon catheter is sometimes used. These must be removed by your healthcare provider. Some types of packing dissolve on their own. If you are taking blood thinning (anticoagulant) medicine, you may have a blood test.  Home care    If packing was put in your nose, unless told otherwise, do not pull on it or try to remove it yourself. You will be given an appointment to have it removed. You may also have been given antibiotics to prevent a sinus infection. If so, finish all of the medicine.    Don't blow your nose for 12 hours after the bleeding stops. This will allow a strong blood clot to form. Don't pick your nose. This may restart bleeding.    Don't drink alcohol or hot liquids for the next 2 days. Alcohol or hot liquids in your mouth can dilate blood vessels in your nose. This can cause bleeding to start again.    Don't take ibuprofen, naproxen, or medicines that contain aspirin. These thin the blood and may cause your nose to bleed. You may take acetaminophen for pain, unless another pain medicine was prescribed.    If the bleeding starts again, sit up and lean forward to prevent swallowing blood. Pinch your nose tightly on both sides, as shown above, for 10 to 15 minutes. Time yourself. Don t release the pressure on your nose until 10 minutes is up. If bleeding does not stop, continue to pinch your nose and call your healthcare provider or return to this facility.    If you have a cold, allergies, or dry  nasal membranes, lubricate the nasal passages. Apply a small amount of petroleum jelly inside the nose with a cotton swab twice a day (morning and night).    Don't overheat your home. This can dry the air and make your condition worse.    Put a humidifier in the room where you sleep. This will add moisture to the air. Clean the humidifier as advised by the .    Use a saline nasal spray to keep nasal passages moist.    Don't pick your nose. Keep fingernails trimmed to decrease risk of bleeds.    Don't smoke.  Follow-up care  Follow up with your healthcare provider, or as advised. Nasal packing should be rechecked or removed within 2 to 3 days.  When to seek medical advice  Call your healthcare provider right away if any of these occur.    You have another nosebleed that you cannot control    Dizziness, weakness, or fainting    You become tired or confused    Fever of 100.4 F (38 C) or higher, or as directed by your healthcare provider    Headache    Sinus or facial pain    Shortness of breath or trouble breathing  Date Last Reviewed: 11/1/2017 2000-2017 [employer]. [address] [place] [address]. All rights reserved. This information is not intended as a substitute for professional medical care. Always follow your healthcare professional's instructions.

## 2018-04-22 ASSESSMENT — PATIENT HEALTH QUESTIONNAIRE - PHQ9: SUM OF ALL RESPONSES TO PHQ QUESTIONS 1-9: 8

## 2018-04-22 ASSESSMENT — ANXIETY QUESTIONNAIRES: GAD7 TOTAL SCORE: 5

## 2018-04-25 ASSESSMENT — ENCOUNTER SYMPTOMS
CARDIOVASCULAR NEGATIVE: 1
HEMATOLOGIC/LYMPHATIC NEGATIVE: 1
ALLERGIC/IMMUNOLOGIC NEGATIVE: 1
RESPIRATORY NEGATIVE: 1
CONSTITUTIONAL NEGATIVE: 1

## 2018-05-22 ENCOUNTER — FCC EXTENDED DOCUMENTATION (OUTPATIENT)
Dept: PSYCHOLOGY | Facility: CLINIC | Age: 23
End: 2018-05-22

## 2018-05-22 NOTE — PROGRESS NOTES
Discharge Summary  Single Session    Client Name: Susy Huerta MRN#: [id number] YOB: 1995      Intake / Discharge Date: 5/22/2018      DSM5 Diagnoses: (Sustained by DSM5 Criteria Listed Above)  Diagnoses: Adjustment Disorders  309.28 (F43.23) With mixed anxiety and depressed mood  Psychosocial & Contextual Factors: Family discord  WHODAS 2.0 (12 item) Score: 14          Presenting Concern:  Issues with family relationships.      Reason for Discharge:  Client did not return      Disposition at Time of Last Encounter:   Comments:   N/A     Risk Management:   Client denies a history of suicidal ideation, suicide attempts, self-injurious behavior, homicidal ideation, homicidal behavior and and other safety concerns  A safety and risk management plan has not been developed at this time, however client was given the after-hours number / 911 should there be a change in any of these risk factors.      Referred To:  N/A        Syeda Andres   5/22/2018

## 2018-06-06 ENCOUNTER — ALLIED HEALTH/NURSE VISIT (OUTPATIENT)
Dept: NURSING | Facility: CLINIC | Age: 23
End: 2018-06-06
Payer: COMMERCIAL

## 2018-06-06 VITALS
BODY MASS INDEX: 50.83 KG/M2 | WEIGHT: 293 LBS | SYSTOLIC BLOOD PRESSURE: 139 MMHG | HEART RATE: 90 BPM | DIASTOLIC BLOOD PRESSURE: 82 MMHG

## 2018-06-06 PROCEDURE — 96372 THER/PROPH/DIAG INJ SC/IM: CPT

## 2018-06-06 NOTE — PROGRESS NOTES
BP: 139/82    LAST PAP/EXAM: No results found for: PAP  URINE HCG:negative    The following medication was given:     MEDICATION: Depo Provera 150mg  ROUTE: IM  SITE: Ventrogluteal - Left  : bodaplanes  LOT #: [id number]  EXP:07/31/2020  NEXT INJECTION DUE: 8/22/18 - 9/5/18   Provider: Devin  //Mercedez Green MA// June 6, 2018 4:56 PM

## 2018-06-06 NOTE — MR AVS SNAPSHOT
After Visit Summary   6/6/2018    Susy Huerta    MRN: [id number]           Patient Information     Date Of Birth          1995        Visit Information        Provider Department      6/6/2018 4:30 PM [facility]        Today's Diagnoses     Contraception    -  1       Follow-ups after your visit        Your next 10 appointments already scheduled     Aug 22, 2018  4:30 PM CDT   Nurse Only with EA NURSE AB   [facility] ([facility])    [address]  [address]  [facility] [id number] [phone]              Who to contact     If you have questions or need follow up information about today's clinic visit or your schedule please contact [facility] ([facility]] directly at [phone].  Normal or non-critical lab and imaging results will be communicated to you by MyChart, letter or phone within 4 business days after the clinic has received the results. If you do not hear from us within 7 days, please contact the clinic through MyChart or phone. If you have a critical or abnormal lab result, we will notify you by phone as soon as possible.  Submit refill requests through [employer] or call your pharmacy and they will forward the refill request to us. Please allow 3 business days for your refill to be completed.          Additional Information About Your Visit        Care EveryWhere ID     This is your Care EveryWhere ID. This could be used by other organizations to access your [place] medical records  [id number]        Your Vitals Were     Pulse BMI (Body Mass Index)                90 50.83 kg/m2           Blood Pressure from Last 3 Encounters:   06/06/18 139/82   04/21/18 118/72   04/17/18 126/60    Weight from Last 3 Encounters:   06/06/18 (!) 344 lb 3.2 oz (156.1 kg)   04/21/18 (!) 346 lb (156.9 kg)   04/17/18 (!) 346 lb (156.9 kg)              We Performed the Following     INJECTION INTRAMUSCULAR OR SUB-Q     Medroxyprogesterone inj   1mg   (Depo Provera J-Code)        Primary Care Provider Office Phone # Fax #    Yohan Beltran [phone] [address] [place] [address]        Equal Access to Services     [facility] EVELIA : Hadii aad ku hadkatelynno Soomaali, waaxda luqadaha, qaybta kaalmada adeoctaviayada, kaushal rootn patti bruno laradhalit sim. So [facility] [phone].    ATENCIÓN: Si habla español, tiene a joseph disposición servicios gratuitos de asistencia lingüística. Llame al [phone].    We comply with applicable federal civil rights laws and [place] laws. We do not discriminate on the basis of race, color, national origin, age, disability, sex, sexual orientation, or gender identity.            Thank you!     Thank you for choosing [facility] (formerly [facility]  for your care. Our goal is always to provide you with excellent care. Hearing back from our patients is one way we can continue to improve our services. Please take a few minutes to complete the written survey that you may receive in the mail after your visit with us. Thank you!             Your Updated Medication List - Protect others around you: Learn how to safely use, store and throw away your medicines at www.disposemymeds.org.          This list is accurate as of 6/6/18  5:01 PM.  Always use your most recent med list.                   Brand Name Dispense Instructions for use Diagnosis    betamethasone dipropionate 0.05 % cream    DIPROSONE    45 g    Apply sparingly to affected area twice daily as needed.  Do not apply to face.    Eczema, unspecified type       desonide 0.05 % ointment    DESOWEN    60 g    Twice daily to arm rash until completely clear.    Dermatitis       fluocinonide 0.05 % ointment    LIDEX    60 g    Twice daily to hand rash until completely clear, then as needed.    Dermatitis       hydrocortisone 2.5 % cream     20 g    Apply topically 2 times daily Can sub form if needed and size as needed    Eczema, unspecified type        medroxyPROGESTERone 150 MG/ML injection    DEPO-PROVERA    3 mL    Inject 1 mL (150 mg) into the muscle every 3 months    Encounter for surveillance of injectable contraceptive, Excessive or frequent menstruation       metFORMIN 500 MG tablet    GLUCOPHAGE    180 tablet    Take 1 tablet (500 mg) by mouth 2 times daily (with meals) Office visit needed prior to additional refills.    Pre-diabetes       sertraline 100 MG tablet    ZOLOFT    180 tablet    Take 2 tablets (200 mg) by mouth daily    Adjustment disorder with mixed anxiety and depressed mood

## 2018-06-25 ENCOUNTER — TRANSFERRED RECORDS (OUTPATIENT)
Dept: HEALTH INFORMATION MANAGEMENT | Facility: CLINIC | Age: 23
End: 2018-06-25

## 2018-07-23 ENCOUNTER — TRANSFERRED RECORDS (OUTPATIENT)
Dept: HEALTH INFORMATION MANAGEMENT | Facility: CLINIC | Age: 23
End: 2018-07-23

## 2018-08-06 ENCOUNTER — TELEPHONE (OUTPATIENT)
Dept: OBGYN | Facility: CLINIC | Age: 23
End: 2018-08-06

## 2018-08-06 NOTE — TELEPHONE ENCOUNTER
Mother calling:  Patient LMP 7/15/18  Bleeding everyday since then. Flow varies. When bleeding is heavy, there is more clotting.  Having back pain and cramping, worse in last few days.  Has tried Advil, Tylenol without relief.  On Depo Provera since D&C for abnormal bleeding 2015  Has had occasional bleeding/spotting on Depo, but nothing like this.  Mild chronic endometritis.    They want to be seen today??  Please advise.  Jamaica Montano RN

## 2018-08-15 ENCOUNTER — OFFICE VISIT (OUTPATIENT)
Dept: OBGYN | Facility: CLINIC | Age: 23
End: 2018-08-15
Payer: COMMERCIAL

## 2018-08-15 ENCOUNTER — HOSPITAL ENCOUNTER (OUTPATIENT)
Dept: ULTRASOUND IMAGING | Facility: CLINIC | Age: 23
Discharge: HOME | End: 2018-08-15
Attending: ADVANCED PRACTICE MIDWIFE | Admitting: ADVANCED PRACTICE MIDWIFE
Payer: COMMERCIAL

## 2018-08-15 VITALS
BODY MASS INDEX: 43.4 KG/M2 | HEIGHT: 69 IN | SYSTOLIC BLOOD PRESSURE: 148 MMHG | WEIGHT: 293 LBS | DIASTOLIC BLOOD PRESSURE: 102 MMHG

## 2018-08-15 DIAGNOSIS — N92.0 EXCESSIVE OR FREQUENT MENSTRUATION: ICD-10-CM

## 2018-08-15 DIAGNOSIS — N92.0 EXCESSIVE OR FREQUENT MENSTRUATION: Primary | ICD-10-CM

## 2018-08-15 PROCEDURE — 99213 OFFICE O/P EST LOW 20 MIN: CPT | Performed by: ADVANCED PRACTICE MIDWIFE

## 2018-08-15 PROCEDURE — 76830 TRANSVAGINAL US NON-OB: CPT

## 2018-08-15 PROCEDURE — 96372 THER/PROPH/DIAG INJ SC/IM: CPT | Performed by: ADVANCED PRACTICE MIDWIFE

## 2018-08-15 RX ORDER — MEDROXYPROGESTERONE ACETATE 150 MG/ML
150 INJECTION, SUSPENSION INTRAMUSCULAR
Qty: 3 ML | Refills: 3 | OUTPATIENT
Start: 2018-08-15 | End: 2021-05-03

## 2018-08-15 NOTE — MR AVS SNAPSHOT
After Visit Summary   8/15/2018    Susy Huerta    MRN: [id number]           Patient Information     Date Of Birth          1995        Visit Information        Provider Department      8/15/2018 8:45 AM Katy Roberts APRN CNM [facility]        Today's Diagnoses     Excessive or frequent menstruation    -  1       Follow-ups after your visit        Follow-up notes from your care team     Return in about 3 months (around 11/15/2018) for depo shot.      Your next 10 appointments already scheduled     Aug 22, 2018  4:30 PM CDT   Nurse Only with EA NURSE AB   [facility] [place] ([facility])    [address]  [address]  [place] [address] [phone]              Future tests that were ordered for you today     Open Future Orders        Priority Expected Expires Ordered    US Pelvic Complete w Transvaginal Routine  8/15/2019 8/15/2018            Who to contact     If you have questions or need follow up information about today's clinic visit or your schedule please contact [facility] directly at [phone].  Normal or non-critical lab and imaging results will be communicated to you by MyChart, letter or phone within 4 business days after the clinic has received the results. If you do not hear from us within 7 days, please contact the clinic through MyChart or phone. If you have a critical or abnormal lab result, we will notify you by phone as soon as possible.  Submit refill requests through [employer] or call your pharmacy and they will forward the refill request to us. Please allow 3 business days for your refill to be completed.          Additional Information About Your Visit        Care EveryWhere ID     This is your Care EveryWhere ID. This could be used by other organizations to access your [place] medical records  [id number]        Your Vitals Were     Height Last Period Breastfeeding? BMI (Body Mass Index)  "         5' 9\" (1.753 m) (LMP Unknown) No 50.8 kg/m2         Blood Pressure from Last 3 Encounters:   08/15/18 (!) 148/102   06/06/18 139/82   04/21/18 118/72    Weight from Last 3 Encounters:   08/15/18 (!) 344 lb (156 kg)   06/06/18 (!) 344 lb 3.2 oz (156.1 kg)   04/21/18 (!) 346 lb (156.9 kg)                 Today's Medication Changes          These changes are accurate as of 8/15/18  9:18 AM.  If you have any questions, ask your nurse or doctor.               These medicines have changed or have updated prescriptions.        Dose/Directions    * medroxyPROGESTERone 150 MG/ML injection   Commonly known as:  DEPO-PROVERA   This may have changed:  Another medication with the same name was added. Make sure you understand how and when to take each.   Used for:  Encounter for surveillance of injectable contraceptive, Excessive or frequent menstruation   Changed by:  Katy Roberts APRN CNM        Dose:  150 mg   Inject 1 mL (150 mg) into the muscle every 3 months   Quantity:  3 mL   Refills:  3       * medroxyPROGESTERone 150 MG/ML injection   Commonly known as:  DEPO-PROVERA   This may have changed:  You were already taking a medication with the same name, and this prescription was added. Make sure you understand how and when to take each.   Used for:  Excessive or frequent menstruation   Changed by:  Katy Roberts APRN CNM        Dose:  150 mg   Inject 1 mL (150 mg) into the muscle every 3 months   Quantity:  3 mL   Refills:  3       * Notice:  This list has 2 medication(s) that are the same as other medications prescribed for you. Read the directions carefully, and ask your doctor or other care provider to review them with you.         Where to get your medicines      Some of these will need a paper prescription and others can be bought over the counter.  Ask your nurse if you have questions.     You don't need a prescription for these medications     medroxyPROGESTERone 150 MG/ML injection "                Primary Care Provider Office Phone # Fax #    Yohan Beltran [phone] [address] [place] [address]        Equal Access to Services     BOBBI PEDRAZAKATELIN : Hadlopez kandy nunez fabián Cedeno, wapatriciada luqadaha, qaybta kaduongda sebastián, kaushal floreslit sim. So [facility] [phone].    ATENCIÓN: Si habla español, tiene a joseph disposición servicios gratuitos de asistencia lingüística. Llame al [phone].    We comply with applicable federal civil rights laws and [place] laws. We do not discriminate on the basis of race, color, national origin, age, disability, sex, sexual orientation, or gender identity.            Thank you!     Thank you for choosing [facility]  for your care. Our goal is always to provide you with excellent care. Hearing back from our patients is one way we can continue to improve our services. Please take a few minutes to complete the written survey that you may receive in the mail after your visit with us. Thank you!             Your Updated Medication List - Protect others around you: Learn how to safely use, store and throw away your medicines at www.disposemymeds.org.          This list is accurate as of 8/15/18  9:18 AM.  Always use your most recent med list.                   Brand Name Dispense Instructions for use Diagnosis    betamethasone dipropionate 0.05 % cream    DIPROSONE    45 g    Apply sparingly to affected area twice daily as needed.  Do not apply to face.    Eczema, unspecified type       desonide 0.05 % ointment    DESOWEN    60 g    Twice daily to arm rash until completely clear.    Dermatitis       fluocinonide 0.05 % ointment    LIDEX    60 g    Twice daily to hand rash until completely clear, then as needed.    Dermatitis       hydrocortisone 2.5 % cream     20 g    Apply topically 2 times daily Can sub form if needed and size as needed    Eczema, unspecified type       *  medroxyPROGESTERone 150 MG/ML injection    DEPO-PROVERA    3 mL    Inject 1 mL (150 mg) into the muscle every 3 months    Encounter for surveillance of injectable contraceptive, Excessive or frequent menstruation       * medroxyPROGESTERone 150 MG/ML injection    DEPO-PROVERA    3 mL    Inject 1 mL (150 mg) into the muscle every 3 months    Excessive or frequent menstruation       metFORMIN 500 MG tablet    GLUCOPHAGE    180 tablet    Take 1 tablet (500 mg) by mouth 2 times daily (with meals) Office visit needed prior to additional refills.    Pre-diabetes       sertraline 100 MG tablet    ZOLOFT    180 tablet    Take 2 tablets (200 mg) by mouth daily    Adjustment disorder with mixed anxiety and depressed mood       * Notice:  This list has 2 medication(s) that are the same as other medications prescribed for you. Read the directions carefully, and ask your doctor or other care provider to review them with you.

## 2018-08-15 NOTE — PROGRESS NOTES
SUBJECTIVE:                                                   Susy Huerta is a 22 year old who presents to clinic today for the following health issue(s):  Patient presents with:  Abnormal Uterine Bleeding      HPI:  Susy presents reporting new onset heavy bleeding with depo provera. She has a history of endometriosis. She had a D&C at age 19 for heavy bleeding. She had an endometrial lining of 3 inches (per patient). She has been using depo provera since. She has not had any significant bleeding until 3 weeks ago when she started having heavy bleeding with cramping. The bleeding and cramping has stopped at this time.     No LMP recorded (lmp unknown). Patient has had an injection.  Menstrual History: irregular   Patient is not sexually active  .  Using depo or other injectable for contraception.   Health maintenance updated:  yes  STI infx testing offered:  Declined    Last PHQ-9 score on record =   PHQ-9 SCORE 2018   Total Score -   Total Score 8     Last GAD7 score on record =   RAMIRO-7 SCORE 2018   Total Score -   Total Score 5     Alcohol Score = 0    Problem list and histories reviewed & adjusted, as indicated.  Additional history: as documented.    Patient Active Problem List   Diagnosis     Allergic rhinitis     Obesity     Adjustment disorder with mixed anxiety and depressed mood     Essential hypertension     Family history of DVT     Major depressive disorder, recurrent episode, mild (H)     Eczema, unspecified eczema     BMI 50.0-59.9, adult (H)     Pre-diabetes     Contraception     Past Surgical History:   Procedure Laterality Date     DILATION AND CURETTAGE, HYSTEROSCOPY DIAGNOSTIC, COMBINED N/A 2015    Procedure: COMBINED DILATION AND CURETTAGE, HYSTEROSCOPY DIAGNOSTIC;  Surgeon: Soraida Rosado MD;  Location:  OR      Social History   Substance Use Topics     Smoking status: Passive Smoke Exposure - Never Smoker     Smokeless tobacco: Never Used      Comment: father  smokes, lives in [place]     Alcohol use No      Problem (# of Occurrences) Relation (Name,Age of Onset)    Cancer - colorectal (1) Maternal Grandfather    Hypertension (4) Maternal Grandmother, Maternal Grandfather, Mother, Maternal Uncle    Other Cancer (1) Maternal Grandmother: lung cancer    Respiratory (1) Maternal Grandmother: emphasema            Current Outpatient Prescriptions   Medication Sig     betamethasone dipropionate (DIPROSONE) 0.05 % cream Apply sparingly to affected area twice daily as needed.  Do not apply to face.     desonide (DESOWEN) 0.05 % ointment Twice daily to arm rash until completely clear.     fluocinonide (LIDEX) 0.05 % ointment Twice daily to hand rash until completely clear, then as needed.     hydrocortisone 2.5 % cream Apply topically 2 times daily Can sub form if needed and size as needed     medroxyPROGESTERone (DEPO-PROVERA) 150 MG/ML injection Inject 1 mL (150 mg) into the muscle every 3 months     medroxyPROGESTERone (DEPO-PROVERA) 150 MG/ML injection Inject 1 mL (150 mg) into the muscle every 3 months     metFORMIN (GLUCOPHAGE) 500 MG tablet Take 1 tablet (500 mg) by mouth 2 times daily (with meals) Office visit needed prior to additional refills.     sertraline (ZOLOFT) 100 MG tablet Take 2 tablets (200 mg) by mouth daily     No current facility-administered medications for this visit.      Allergies   Allergen Reactions     Amoxicillin Anaphylaxis     Cefzil [Cefprozil] Hives     Sulfa Drugs Other (See Comments)     n/v       ROS:  CONSTITUTIONAL: NEGATIVE for fever, chills, change in weight  INTEGUMENTARU/SKIN: NEGATIVE for worrisome rashes, moles or lesions  EYES: NEGATIVE for vision changes or irritation  ENT: NEGATIVE for ear, mouth and throat problems  RESP: NEGATIVE for significant cough or SOB  BREAST: NEGATIVE for masses, tenderness or discharge  CV: NEGATIVE for chest pain, palpitations or peripheral edema  GI: NEGATIVE for nausea, abdominal pain, heartburn, or  "change in bowel habits  : NEGATIVE for unusual urinary or vaginal symptoms. Periods are irregular.  MUSCULOSKELETAL: NEGATIVE for significant arthralgias or myalgia  NEURO: NEGATIVE for weakness, dizziness or paresthesias  PSYCHIATRIC: NEGATIVE for changes in mood or affect    OBJECTIVE:     BP (!) 148/102  Ht 5' 9\" (1.753 m)  Wt (!) 344 lb (156 kg)  LMP  (LMP Unknown)  Breastfeeding? No  BMI 50.8 kg/m2  Body mass index is 50.8 kg/(m^2).    PHYSICAL EXAM:  Constitutional:  Appearance: Well nourished, well developed alert, in no acute distress  Chest:  Respiratory Effort:  Breathing unlabored  Skin:General Inspection:  No rashes present, no lesions present, no areas of discoloration; Genitalia and Groin:  No rashes present, no lesions present, no areas of discoloration, no masses present.  Neurologic/Psychiatric:  Mental Status:  Oriented X3      In-Clinic Test Results:  No results found for this or any previous visit (from the past 24 hour(s)).    ASSESSMENT/PLAN:                                                        ICD-10-CM    1. Excessive or frequent menstruation N92.0 US Pelvic Complete w Transvaginal     medroxyPROGESTERone (DEPO-PROVERA) 150 MG/ML injection       PLAN:    Discussed potential causes of cycle irregularity including a thickened endometrial lining again and stress. Depo is due next week. Patient agrees to get her depo shot early today to see if that keeps the bleeding away. Pelvic U/S ordered to assess endometrial lining and potential ovarian cysts. Will notify patient of results. Patient and mother verbalize understanding and agree with plan.     Katy Roberts CNM    "

## 2018-08-15 NOTE — NURSING NOTE
"Chief Complaint   Patient presents with     Abnormal Uterine Bleeding     Bleeding started 3 weeks ago with small clots.    Initial BP (!) 148/102  Ht 5' 9\" (1.753 m)  Wt (!) 344 lb (156 kg)  LMP  (LMP Unknown)  Breastfeeding? No  BMI 50.8 kg/m2 Estimated body mass index is 50.8 kg/(m^2) as calculated from the following:    Height as of this encounter: 5' 9\" (1.753 m).    Weight as of this encounter: 344 lb (156 kg).  BP completed using cuff size: large, long        Cathi Aaron CMA               "

## 2018-11-01 ENCOUNTER — ALLIED HEALTH/NURSE VISIT (OUTPATIENT)
Dept: NURSING | Facility: CLINIC | Age: 23
End: 2018-11-01
Payer: COMMERCIAL

## 2018-11-01 DIAGNOSIS — Z23 NEED FOR PROPHYLACTIC VACCINATION AND INOCULATION AGAINST INFLUENZA: Primary | ICD-10-CM

## 2018-11-01 PROCEDURE — 90686 IIV4 VACC NO PRSV 0.5 ML IM: CPT

## 2018-11-01 PROCEDURE — 99207 ZZC NO CHARGE NURSE ONLY: CPT

## 2018-11-01 PROCEDURE — 90471 IMMUNIZATION ADMIN: CPT

## 2018-11-01 NOTE — PROGRESS NOTES

## 2018-11-01 NOTE — MR AVS SNAPSHOT
"              After Visit Summary   11/1/2018    Susy Huerta    MRN: [id number]           Patient Information     Date Of Birth          1995        Visit Information        Provider Department      11/1/2018 4:15 PM RI OB NURSE [facility]        Today's Diagnoses     Need for prophylactic vaccination and inoculation against influenza    -  1       Follow-ups after your visit        Your next 10 appointments already scheduled     Jan 17, 2019  3:30 PM CST   Nurse Only with [facility] OB NURSE   [facility]    [address]  [facility] [id number] [phone]              Who to contact     If you have questions or need follow up information about today's clinic visit or your schedule please contact [facility] directly at [phone].  Normal or non-critical lab and imaging results will be communicated to you by MyChart, letter or phone within 4 business days after the clinic has received the results. If you do not hear from us within 7 days, please contact the clinic through MyChart or phone. If you have a critical or abnormal lab result, we will notify you by phone as soon as possible.  Submit refill requests through [employer] or call your pharmacy and they will forward the refill request to us. Please allow 3 business days for your refill to be completed.          Additional Information About Your Visit        [address] [employer] Information     [employer] lets you send messages to your doctor, view your test results, renew your prescriptions, schedule appointments and more. To sign up, go to [email or web address] . Click on \"Log in\" on the left side of the screen, which will take you to the Welcome page. Then click on \"Sign up Now\" on the right side of the page.     You will be asked to enter the access code listed below, as well as some personal information. Please follow the directions to create your username and password.     Your " access code is: [email or web address]  Expires: 2019  4:48 PM     Your access code will  in 90 days. If you need help or a new code, please call your [place] clinic or [phone].        Care EveryWhere ID     This is your Care EveryWhere ID. This could be used by other organizations to access your [place] medical records  [id number]         Blood Pressure from Last 3 Encounters:   08/15/18 (!) 148/102   18 139/82   18 118/72    Weight from Last 3 Encounters:   08/15/18 (!) 344 lb (156 kg)   18 (!) 344 lb 3.2 oz (156.1 kg)   18 (!) 346 lb (156.9 kg)              We Performed the Following     FLU VACCINE, SPLIT VIRUS, IM (QUADRIVALENT) [57384]- >3 YRS     Vaccine Administration, Initial [85750]        Primary Care Provider Office Phone # Fax #    Yohan Beltran [phone]       [facility] [place] [address]        Equal Access to Services     [facility]: Hadii kandy nunez hadasho Sojanet, waaxda luqadaha, qaybta kaalmaracheal lowery, kaushal pleitez . So [facility] [phone].    ATENCIÓN: Si habla español, tiene a joseph disposición servicios gratuitos de asistencia lingüística. [facility] [phone].    We comply with applicable federal civil rights laws and [place] laws. We do not discriminate on the basis of race, color, national origin, age, disability, sex, sexual orientation, or gender identity.            Thank you!     Thank you for choosing [facility]  for your care. Our goal is always to provide you with excellent care. Hearing back from our patients is one way we can continue to improve our services. Please take a few minutes to complete the written survey that you may receive in the mail after your visit with us. Thank you!             Your Updated Medication List - Protect others around you: Learn how to safely use, store and throw away your medicines at www.[facility].org.          This list is accurate  as of 11/1/18  4:24 PM.  Always use your most recent med list.                   Brand Name Dispense Instructions for use Diagnosis    betamethasone dipropionate 0.05 % cream    DIPROSONE    45 g    Apply sparingly to affected area twice daily as needed.  Do not apply to face.    Eczema, unspecified type       desonide 0.05 % ointment    DESOWEN    60 g    Twice daily to arm rash until completely clear.    Dermatitis       fluocinonide 0.05 % ointment    LIDEX    60 g    Twice daily to hand rash until completely clear, then as needed.    Dermatitis       hydrocortisone 2.5 % cream     20 g    Apply topically 2 times daily Can sub form if needed and size as needed    Eczema, unspecified type       medroxyPROGESTERone 150 MG/ML injection    DEPO-PROVERA    3 mL    Inject 1 mL (150 mg) into the muscle every 3 months    Excessive or frequent menstruation       metFORMIN 500 MG tablet    GLUCOPHAGE    60 tablet    TAKE ONE TABLET BY MOUTH TWICE A DAY WITH MEALS. (NEED TO BE SEEN IN CLINIC FOR FURTHER REFILLS)    Pre-diabetes       sertraline 100 MG tablet    ZOLOFT    180 tablet    Take 2 tablets (200 mg) by mouth daily    Adjustment disorder with mixed anxiety and depressed mood

## 2018-11-15 DIAGNOSIS — R73.03 PRE-DIABETES: ICD-10-CM

## 2018-11-15 NOTE — PROGRESS NOTES
SUBJECTIVE:   CC: Susy Huerta is an 22 year old woman who presents for preventive health visit.     Physical   Annual:     Getting at least 3 servings of Calcium per day:  Yes    Bi-annual eye exam:  Yes    Dental care twice a year:  Yes    Sleep apnea or symptoms of sleep apnea:  None    Diet:  Regular (no restrictions)    Frequency of exercise:  2-3 days/week    Duration of exercise:  15-30 minutes    Taking medications regularly:  Yes    Medication side effects:  None    Additional concerns today:  No    CONCERNS: None    Needs refill on metformin.     # Obesity  Does work on diet/exercise.   Family history of obesity.   Doesn't like needles, not interested in bariatric surgery.   Open to taking medications and meeting with Endocrine.  Doesn't binge eat.    Today's PHQ-2 Score:   PHQ-2 ( 1999 [employer]) 11/16/2018   Q1: Little interest or pleasure in doing things 0   Q2: Feeling down, depressed or hopeless 0   PHQ-2 Score 0   Q1: Little interest or pleasure in doing things Not at all   Q2: Feeling down, depressed or hopeless Not at all   PHQ-2 Score 0     Abuse: Current or Past(Physical, Sexual or Emotional)- No  Do you feel safe in your environment - Yes    Social History   Substance Use Topics     Smoking status: Never Smoker     Smokeless tobacco: Never Used     Alcohol use No     Alcohol Use 11/16/2018   If you drink alcohol do you typically have greater than 3 drinks per day OR greater than 7 drinks per week? Not Applicable   No flowsheet data found.    Reviewed orders with patient.  Reviewed health maintenance and updated orders accordingly - Yes  BP Readings from Last 3 Encounters:   11/16/18 124/62   08/15/18 (!) 148/102   06/06/18 139/82    Wt Readings from Last 3 Encounters:   11/16/18 (!) 352 lb 6.4 oz (159.8 kg)   08/15/18 (!) 344 lb (156 kg)   06/06/18 (!) 344 lb 3.2 oz (156.1 kg)            Patient Active Problem List   Diagnosis     Allergic rhinitis     Obesity     Adjustment disorder with mixed  anxiety and depressed mood     Essential hypertension     Family history of DVT     Major depressive disorder, recurrent episode, mild (H)     Eczema, unspecified eczema     BMI 50.0-59.9, adult (H)     Pre-diabetes     Contraception     Past Surgical History:   Procedure Laterality Date     DILATION AND CURETTAGE, HYSTEROSCOPY DIAGNOSTIC, COMBINED N/A 6/23/2015    Procedure: COMBINED DILATION AND CURETTAGE, HYSTEROSCOPY DIAGNOSTIC;  Surgeon: Soraida Rosado MD;  Location:  OR       Social History   Substance Use Topics     Smoking status: Never Smoker     Smokeless tobacco: Never Used     Alcohol use No     Family History   Problem Relation Age of Onset     Respiratory Maternal Grandmother      emphasema     Hypertension Maternal Grandmother      Other Cancer Maternal Grandmother      lung cancer     Cancer - colorectal Maternal Grandfather      Diagnosed at age 60     Hypertension Maternal Grandfather      Hypertension Mother      Other - See Comments Father      Not involved in her life     Arthritis Sister      SHAYLA     Pulmonary Embolism Sister      On Virginie, Mom thinks [facility] Leiden neg     Hypertension Maternal Uncle      Breast Cancer No family hx of          Current Outpatient Prescriptions   Medication Sig Dispense Refill     medroxyPROGESTERone (DEPO-PROVERA) 150 MG/ML injection Inject 1 mL (150 mg) into the muscle every 3 months 3 mL 3     metFORMIN (GLUCOPHAGE) 500 MG tablet Take 1 tablet (500 mg) by mouth 2 times daily (with meals) 180 tablet 3     sertraline (ZOLOFT) 100 MG tablet Take 2 tablets (200 mg) by mouth daily 180 tablet 3     [DISCONTINUED] metFORMIN (GLUCOPHAGE) 500 MG tablet TAKE ONE TABLET BY MOUTH TWICE A DAY WITH MEALS. (NEED TO BE SEEN IN CLINIC FOR FURTHER REFILLS) 60 tablet 0       Mammogram not appropriate for this patient based on age.    Pertinent mammograms are reviewed under the imaging tab.  History of abnormal Pap smear: NO - under age 21, PAP not appropriate for age    "  Reviewed and updated as needed this visit by clinical staff  Tobacco  Allergies  Meds  Med Hx  Surg Hx  Fam Hx  Soc Hx        Reviewed and updated as needed this visit by Provider  Med Hx  Fam Hx        Past Medical History:   Diagnosis Date     Menorrhagia      Obese       Past Surgical History:   Procedure Laterality Date     DILATION AND CURETTAGE, HYSTEROSCOPY DIAGNOSTIC, COMBINED N/A 6/23/2015    Procedure: COMBINED DILATION AND CURETTAGE, HYSTEROSCOPY DIAGNOSTIC;  Surgeon: Soraida Rosado MD;  Location:  OR     Review of Systems   Constitutional: Negative for chills and fever.   HENT: Positive for congestion. Negative for ear pain, hearing loss and sore throat.    Eyes: Negative for pain and visual disturbance.   Respiratory: Positive for cough. Negative for shortness of breath.    Cardiovascular: Negative for chest pain, palpitations and peripheral edema.   Gastrointestinal: Negative for abdominal pain, constipation, diarrhea, heartburn, hematochezia and nausea.   Breasts:  Negative for tenderness, breast mass and discharge.   Genitourinary: Negative for dysuria, frequency, genital sores, hematuria, pelvic pain, urgency, vaginal bleeding and vaginal discharge.   Musculoskeletal: Negative for arthralgias, joint swelling and myalgias.   Skin: Positive for rash.   Neurological: Negative for dizziness, weakness, headaches and paresthesias.   Psychiatric/Behavioral: Negative for mood changes. The patient is not nervous/anxious.      OBJECTIVE:   /62  Pulse 92  Temp 97.4  F (36.3  C) (Oral)  Ht 5' 8\" (1.727 m)  Wt (!) 352 lb 6.4 oz (159.8 kg)  SpO2 98%  Breastfeeding? No  BMI 53.58 kg/m2     Physical Exam  GENERAL: healthy, alert and no distress, obese  EYES: Eyes grossly normal to inspection, PERRL and conjunctivae and sclerae normal  HENT: ear canals and TM's normal, nose and mouth without ulcers or lesions  NECK: no adenopathy, no asymmetry, masses, or scars and thyroid normal to " "palpation  RESP: lungs clear to auscultation - no rales, rhonchi or wheezes  CV: regular rate and rhythm, normal S1 S2, no S3 or S4, no murmur, click or rub, no peripheral edema and peripheral pulses strong  ABDOMEN: soft, nontender, no hepatosplenomegaly, no masses and bowel sounds normal  MS: no gross musculoskeletal defects noted, no edema  SKIN: no suspicious lesions or rashes  NEURO: Normal strength and tone, mentation intact and speech normal  PSYCH: mentation appears normal, affect normal/bright    Diagnostic Test Results:  Labs in process, see results.    ASSESSMENT/PLAN:       ICD-10-CM    1. Routine general medical examination at a health care facility Z00.00    2. Pre-diabetes R73.03 metFORMIN (GLUCOPHAGE) 500 MG tablet   3. BMI 50.0-59.9, adult (H) Z68.43 Hemoglobin A1c     ENDOCRINOLOGY ADULT REFERRAL     Never sexually active.     Repeat hgb a1c today - continue metformin.     Discussed obesity - open to meeting with Endo and talking about meds. Thought [facility] was overwhelming and does better with one person at a time. Assisted in scheduling.     F/u in 6 months for obesity and mood.     COUNSELING:  Reviewed preventive health counseling, as reflected in patient instructions       Regular exercise       Healthy diet/nutrition       Family planning       Safe sex practices/STD prevention    BP Readings from Last 1 Encounters:   11/16/18 124/62     Estimated body mass index is 53.58 kg/(m^2) as calculated from the following:    Height as of this encounter: 5' 8\" (1.727 m).    Weight as of this encounter: 352 lb 6.4 oz (159.8 kg).    BP Screening:   Last 3 BP Readings:    BP Readings from Last 3 Encounters:   11/16/18 124/62   08/15/18 (!) 148/102   06/06/18 139/82       The following was recommended to the patient:  Re-screen BP within a year and recommended lifestyle modifications  Weight management plan: Patient referred to endocrine and/or weight management specialty     reports that " she has never smoked. She has never used smokeless tobacco.    Counseling Resources:  ATP IV Guidelines  Pooled Cohorts Equation Calculator  Breast Cancer Risk Calculator  FRAX Risk Assessment  ICSI Preventive Guidelines  Dietary Guidelines for Americans, 2010  USDA's MyPlate  ASA Prophylaxis  Lung CA Screening    Ran Rodriguez MD  [facility]

## 2018-11-15 NOTE — TELEPHONE ENCOUNTER
"Requested Prescriptions   Pending Prescriptions Disp Refills     metFORMIN (GLUCOPHAGE) 500 MG tablet [Pharmacy Med Name: METFORMIN HCL 500MG TABS] 60 tablet 0    Last Written Prescription Date:  10/3/18  Last Fill Quantity: 60,  # refills: 0   Last office visit: 4/17/2018 with prescribing provider:  NO   Future Office Visit:   Next 5 appointments (look out 90 days)     Jan 17, 2019  3:30 PM CST   [facility]    [address]  [facility] [id number] [phone]                  Sig: TAKE ONE TABLET BY MOUTH TWICE A DAY WITH MEALS (NEED TO BE SEEN IN CLINIC FOR FURTHER REFILLS)    Biguanide Agents Failed    11/15/2018 10:38 AM       Failed - Blood pressure less than 140/90 in past 6 months    BP Readings from Last 3 Encounters:   08/15/18 (!) 148/102   06/06/18 139/82   04/21/18 118/72                Failed - Patient has documented LDL within the past 12 mos.    Recent Labs   Lab Test  09/08/17   1049   LDL  66            Failed - Patient has had a Microalbumin in the past 15 mos.    No lab results found.         Failed - Patient has documented A1c within the specified period of time.    If HgbA1C is 8 or greater, it needs to be on file within the past 3 months.  If less than 8, must be on file within the past 6 months.     Recent Labs   Lab Test  09/08/17   1049   A1C  5.6            Failed - Recent (6 mo) or future (30 days) visit within the authorizing provider's specialty    Patient had office visit in the last 6 months or has a visit in the next 30 days with authorizing provider or within the authorizing provider's specialty.  See \"Patient Info\" tab in inbasket, or \"Choose Columns\" in Meds & Orders section of the refill encounter.           Passed - Patient is age 10 or older       Passed - Patient's CR is NOT>1.4 OR Patient's EGFR is NOT<45 within past 12 mos.    Recent Labs   Lab Test  07/27/16   1101   GFRESTIMATED  " >90  Non  GFR Calc     GFRESTBLACK  >90   GFR Calc         Recent Labs   Lab Test  07/27/16   1101   CR  0.62            Passed - Patient does NOT have a diagnosis of CHF.       Passed - Patient is not pregnant       Passed - Patient has not had a positive pregnancy test within the past 12 mos.

## 2018-11-15 NOTE — PATIENT INSTRUCTIONS
Nice to meet you today!    Referral to Endocrinology - we want to keep you healthy as long as you can. Often times we need extra tools for medical conditions. We'll help you schedule.     Follow up in 6 months - check in on mental health & weight  - let me know if the obsessive thoughts start to be more bothersome -> I would start CBT (therapy/counseling).     Preventive Health Recommendations  Female Ages 18 to 20     Yearly exam:     See your health care provider every year in order to  o Review health changes.   o Discuss preventive care.    o Review your medicines if your doctor has prescribed any.      You should be tested each year for STDs (sexually transmitted diseases).       After age 20, talk to your provider about how often you should have cholesterol testing.      If you are at risk for diabetes, you should have a diabetes test (fasting glucose).     Shots:     Get a flu shot each year.     Get a tetanus shot every 10 years.     Consider getting the shot (vaccine) that prevents cervical cancer (Gardasil).    Nutrition:     Eat at least 5 servings of fruits and vegetables each day.    Eat whole-grain bread, whole-wheat pasta and brown rice instead of white grains and rice.    Get adequate Calcium and Vitamin D.     Lifestyle    Exercise at least 150 minutes a week each week (30 minutes a day, 5 days a week). This will help you control your weight and prevent disease.    No smoking.     Wear sunscreen to prevent skin cancer.    See your dentist every six months for an exam and cleaning.

## 2018-11-16 ENCOUNTER — OFFICE VISIT (OUTPATIENT)
Dept: PEDIATRICS | Facility: CLINIC | Age: 23
End: 2018-11-16
Payer: COMMERCIAL

## 2018-11-16 VITALS
DIASTOLIC BLOOD PRESSURE: 62 MMHG | OXYGEN SATURATION: 98 % | BODY MASS INDEX: 44.41 KG/M2 | TEMPERATURE: 97.4 F | WEIGHT: 293 LBS | SYSTOLIC BLOOD PRESSURE: 124 MMHG | HEIGHT: 68 IN | HEART RATE: 92 BPM

## 2018-11-16 DIAGNOSIS — R73.03 PRE-DIABETES: ICD-10-CM

## 2018-11-16 DIAGNOSIS — Z00.00 ROUTINE GENERAL MEDICAL EXAMINATION AT A HEALTH CARE FACILITY: Primary | ICD-10-CM

## 2018-11-16 LAB — HBA1C MFR BLD: 5.7 % (ref 0–5.6)

## 2018-11-16 PROCEDURE — 83036 HEMOGLOBIN GLYCOSYLATED A1C: CPT | Performed by: INTERNAL MEDICINE

## 2018-11-16 PROCEDURE — 99395 PREV VISIT EST AGE 18-39: CPT | Performed by: INTERNAL MEDICINE

## 2018-11-16 PROCEDURE — 36415 COLL VENOUS BLD VENIPUNCTURE: CPT | Performed by: INTERNAL MEDICINE

## 2018-11-16 ASSESSMENT — ENCOUNTER SYMPTOMS
CONSTIPATION: 0
JOINT SWELLING: 0
PALPITATIONS: 0
PARESTHESIAS: 0
EYE PAIN: 0
CHILLS: 0
HEMATOCHEZIA: 0
ARTHRALGIAS: 0
MYALGIAS: 0
DYSURIA: 0
FEVER: 0
SORE THROAT: 0
BREAST MASS: 0
DIARRHEA: 0
HEARTBURN: 0
NAUSEA: 0
HEADACHES: 0
COUGH: 1
DIZZINESS: 0
WEAKNESS: 0
HEMATURIA: 0
SHORTNESS OF BREATH: 0
ABDOMINAL PAIN: 0
FREQUENCY: 0
NERVOUS/ANXIOUS: 0

## 2018-11-16 NOTE — MR AVS SNAPSHOT
After Visit Summary   11/16/2018    Susy Huerta    MRN: [id number]           Patient Information     Date Of Birth          1995        Visit Information        Provider Department      11/16/2018 12:50 PM Ran Rodriguez MD [facility]        Today's Diagnoses     Routine general medical examination at a health care facility    -  1    Pre-diabetes        BMI 50.0-59.9, adult (H)          Care Instructions    Nice to meet you today!    Referral to Endocrinology - we want to keep you healthy as long as you can. Often times we need extra tools for medical conditions. We'll help you schedule.     Follow up in 6 months - check in on mental health & weight  - let me know if the obsessive thoughts start to be more bothersome -> I would start CBT (therapy/counseling).     Preventive Health Recommendations  Female Ages 18 to 20     Yearly exam:     See your health care provider every year in order to  o Review health changes.   o Discuss preventive care.    o Review your medicines if your doctor has prescribed any.      You should be tested each year for STDs (sexually transmitted diseases).       After age 20, talk to your provider about how often you should have cholesterol testing.      If you are at risk for diabetes, you should have a diabetes test (fasting glucose).     Shots:     Get a flu shot each year.     Get a tetanus shot every 10 years.     Consider getting the shot (vaccine) that prevents cervical cancer (Gardasil).    Nutrition:     Eat at least 5 servings of fruits and vegetables each day.    Eat whole-grain bread, whole-wheat pasta and brown rice instead of white grains and rice.    Get adequate Calcium and Vitamin D.     Lifestyle    Exercise at least 150 minutes a week each week (30 minutes a day, 5 days a week). This will help you control your weight and prevent disease.    No smoking.     Wear sunscreen to prevent skin cancer.    See your dentist every six  months for an exam and cleaning.          Follow-ups after your visit        Additional Services     ENDOCRINOLOGY ADULT REFERRAL       Your provider has referred you to: [facility]: [facility] [phone]   [email or web address]/  [facility]: [facility] [phone]   [email or web address]/  [facility]: [facility] [phone]   [email or web address]/      Please be aware that coverage of these services is subject to the terms and limitations of your health insurance plan.  Call member services at your health plan with any benefit or coverage questions.      Please bring the following to your appointment:    >>   Any x-rays, CTs or MRIs which have been performed.  Contact the facility where they were done to arrange for  prior to your scheduled appointment.    >>   List of current medications   >>   This referral request   >>   Any documents/labs given to you for this referral                  Follow-up notes from your care team     Return in about 6 months (around 5/16/2019) for Follow Up mental health, weight.      Your next 10 appointments already scheduled     Jan 09, 2019  2:30 PM CST   [facility]    [address]  [facility] [address] [phone]            Jan 17, 2019  3:30 PM CST   Nurse Only with [place] OB NURSE   [facility]    [address]  [facility] [address] [phone]              Who to contact     If you have questions or need follow up information about today's clinic visit or your schedule please contact [facility] directly at [phone].  Normal or non-critical lab and imaging results will be communicated to you by MyChart, letter or phone within 4 business days after the clinic has  "received the results. If you do not hear from us within 7 days, please contact the clinic through [employer] or phone. If you have a critical or abnormal lab result, we will notify you by phone as soon as possible.  Submit refill requests through [employer] or call your pharmacy and they will forward the refill request to us. Please allow 3 business days for your refill to be completed.          Additional Information About Your Visit        [employer] Information     [employer] gives you secure access to your electronic health record. If you see a primary care provider, you can also send messages to your care team and make appointments. If you have questions, please call your primary care clinic.  If you do not have a primary care provider, please call [phone] and they will assist you.        Care EveryWhere ID     This is your Care EveryWhere ID. This could be used by other organizations to access your [facility] medical records  [id number]        Your Vitals Were     Pulse Temperature Height Pulse Oximetry Breastfeeding? BMI (Body Mass Index)    92 97.4  F (36.3  C) (Oral) 5' 8\" (1.727 m) 98% No 53.58 kg/m2       Blood Pressure from Last 3 Encounters:   11/16/18 124/62   08/15/18 (!) 148/102   06/06/18 139/82    Weight from Last 3 Encounters:   11/16/18 (!) 352 lb 6.4 oz (159.8 kg)   08/15/18 (!) 344 lb (156 kg)   06/06/18 (!) 344 lb 3.2 oz (156.1 kg)              We Performed the Following     ENDOCRINOLOGY ADULT REFERRAL     Hemoglobin A1c          Today's Medication Changes          These changes are accurate as of 11/16/18  1:47 PM.  If you have any questions, ask your nurse or doctor.               These medicines have changed or have updated prescriptions.        Dose/Directions    metFORMIN 500 MG tablet   Commonly known as:  GLUCOPHAGE   This may have changed:  See the new instructions.   Used for:  Pre-diabetes   Changed by:  Ran Rodriguez MD        Dose:  500 mg   Take 1 tablet (500 mg) by mouth 2 " times daily (with meals)   Quantity:  180 tablet   Refills:  3            Where to get your medicines      These medications were sent to [facility] - [address]   [address], [place] [address]     Phone:  [phone]     metFORMIN 500 MG tablet                Primary Care Provider Office Phone # Fax #    Yohan Beltran [phone] [address] [place] [address]        Equal Access to Services     [facility]: Hadii aad ku hadasho Soomaali, waaxda luqadaha, qaybta kaalmada adeegyada, waxay idiin hayaan adeeg kharashruthi lajohn . So [facility] [phone].    ATENCIÓN: Si habla español, tiene a joseph disposición servicios gratuitos de asistencia lingüística. [facility] [phone].    We comply with applicable federal civil rights laws and [place] laws. We do not discriminate on the basis of race, color, national origin, age, disability, sex, sexual orientation, or gender identity.            Thank you!     Thank you for choosing [facility]  for your care. Our goal is always to provide you with excellent care. Hearing back from our patients is one way we can continue to improve our services. Please take a few minutes to complete the written survey that you may receive in the mail after your visit with us. Thank you!             Your Updated Medication List - Protect others around you: Learn how to safely use, store and throw away your medicines at www.disposemymeds.org.          This list is accurate as of 11/16/18  1:47 PM.  Always use your most recent med list.                   Brand Name Dispense Instructions for use Diagnosis    medroxyPROGESTERone 150 MG/ML injection    DEPO-PROVERA    3 mL    Inject 1 mL (150 mg) into the muscle every 3 months    Excessive or frequent menstruation       metFORMIN 500 MG tablet    GLUCOPHAGE    180 tablet    Take 1 tablet (500 mg) by mouth 2 times daily (with meals)     Pre-diabetes       sertraline 100 MG tablet    ZOLOFT    180 tablet    Take 2 tablets (200 mg) by mouth daily    Adjustment disorder with mixed anxiety and depressed mood

## 2018-11-16 NOTE — LETTER
My Depression Action Plan  Name: Susy Huerta   Date of Birth 1995  Date: 11/16/2018    My doctor: Yohan Beltran   My clinic: [address]  [address]  [facility] [address] [phone]          GREEN    ZONE   Good Control    What it looks like:     Things are going generally well. You have normal up s and down s. You may even feel depressed from time to time, but bad moods usually last less than a day.   What you need to do:  1. Continue to care for yourself (see self care plan)  2. Check your depression survival kit and update it as needed  3. Follow your physician s recommendations including any medication.  4. Do not stop taking medication unless you consult with your physician first.           YELLOW         ZONE Getting Worse    What it looks like:     Depression is starting to interfere with your life.     It may be hard to get out of bed; you may be starting to isolate yourself from others.    Symptoms of depression are starting to last most all day and this has happened for several days.     You may have suicidal thoughts but they are not constant.   What you need to do:     1. Call your care team, your response to treatment will improve if you keep your care team informed of your progress. Yellow periods are signs an adjustment may need to be made.     2. Continue your self-care, even if you have to fake it!    3. Talk to someone in your support network    4. Open up your depression survival kit           RED    ZONE Medical Alert - Get Help    What it looks like:     Depression is seriously interfering with your life.     You may experience these or other symptoms: You can t get out of bed most days, can t work or engage in other necessary activities, you have trouble taking care of basic hygiene, or basic responsibilities, thoughts of suicide or death that will not go away, self-injurious behavior.     What you need to do:  1. Call your care  team and request a same-day appointment. If they are not available (weekends or after hours) call your local crisis line, emergency room or 911.            Depression Self Care Plan / Survival Kit    Self-Care for Depression  Here s the deal. Your body and mind are really not as separate as most people think.  What you do and think affects how you feel and how you feel influences what you do and think. This means if you do things that people who feel good do, it will help you feel better.  Sometimes this is all it takes.  There is also a place for medication and therapy depending on how severe your depression is, so be sure to consult with your medical provider and/ or Behavioral Health Consultant if your symptoms are worsening or not improving.     In order to better manage my stress, I will:    Exercise  Get some form of exercise, every day. This will help reduce pain and release endorphins, the  feel good  chemicals in your brain. This is almost as good as taking antidepressants!  This is not the same as joining a gym and then never going! (they count on that by the way ) It can be as simple as just going for a walk or doing some gardening, anything that will get you moving.      Hygiene   Maintain good hygiene (Get out of bed in the morning, Make your bed, Brush your teeth, Take a shower, and Get dressed like you were going to work, even if you are unemployed).  If your clothes don't fit try to get ones that do.    Diet  I will strive to eat foods that are good for me, drink plenty of water, and avoid excessive sugar, caffeine, alcohol, and other mood-altering substances.  Some foods that are helpful in depression are: complex carbohydrates, B vitamins, flaxseed, fish or fish oil, fresh fruits and vegetables.    Psychotherapy  I agree to participate in Individual Therapy (if recommended).    Medication  If prescribed medications, I agree to take them.  Missing doses can result in serious side effects.  I  understand that drinking alcohol, or other illicit drug use, may cause potential side effects.  I will not stop my medication abruptly without first discussing it with my provider.    Staying Connected With Others  I will stay in touch with my friends, family members, and my primary care provider/team.    Use your imagination  Be creative.  We all have a creative side; it doesn t matter if it s oil painting, sand castles, or mud pies! This will also kick up the endorphins.    Witness Beauty  (AKA stop and smell the roses) Take a look outside, even in mid-winter. Notice colors, textures. Watch the squirrels and birds.     Service to others  Be of service to others.  There is always someone else in need.  By helping others we can  get out of ourselves  and remember the really important things.  This also provides opportunities for practicing all the other parts of the program.    Humor  Laugh and be silly!  Adjust your TV habits for less news and crime-drama and more comedy.    Control your stress  Try breathing deep, massage therapy, biofeedback, and meditation. Find time to relax each day.     My support system    Clinic Contact:  Phone number:    Contact 1:  Phone number:    Contact 2:  Phone number:    Sikh/:  Phone number:    Therapist:  Phone number:    Local crisis center:    Phone number:    Other community support:  Phone number:

## 2019-01-09 ENCOUNTER — OFFICE VISIT (OUTPATIENT)
Dept: ENDOCRINOLOGY | Facility: CLINIC | Age: 24
End: 2019-01-09
Payer: COMMERCIAL

## 2019-01-09 VITALS
SYSTOLIC BLOOD PRESSURE: 134 MMHG | BODY MASS INDEX: 44.41 KG/M2 | HEIGHT: 68 IN | TEMPERATURE: 98 F | HEART RATE: 104 BPM | WEIGHT: 293 LBS | OXYGEN SATURATION: 97 % | DIASTOLIC BLOOD PRESSURE: 84 MMHG

## 2019-01-09 DIAGNOSIS — E66.813 CLASS 3 SEVERE OBESITY WITHOUT SERIOUS COMORBIDITY WITH BODY MASS INDEX (BMI) OF 50.0 TO 59.9 IN ADULT, UNSPECIFIED OBESITY TYPE (H): ICD-10-CM

## 2019-01-09 DIAGNOSIS — E66.01 CLASS 3 SEVERE OBESITY WITHOUT SERIOUS COMORBIDITY WITH BODY MASS INDEX (BMI) OF 50.0 TO 59.9 IN ADULT, UNSPECIFIED OBESITY TYPE (H): ICD-10-CM

## 2019-01-09 PROCEDURE — 36415 COLL VENOUS BLD VENIPUNCTURE: CPT | Performed by: INTERNAL MEDICINE

## 2019-01-09 PROCEDURE — 84443 ASSAY THYROID STIM HORMONE: CPT | Performed by: INTERNAL MEDICINE

## 2019-01-09 PROCEDURE — 99243 OFF/OP CNSLTJ NEW/EST LOW 30: CPT | Performed by: INTERNAL MEDICINE

## 2019-01-09 ASSESSMENT — MIFFLIN-ST. JEOR: SCORE: 2448.72

## 2019-01-09 NOTE — PROGRESS NOTES
Name: Susy Huerta  Seen in consultation with Yohan Beltran for obesity.  HPI:  Susy Huerta is a 23 year old female who presents for the evaluation of obestiy.  Body mass index is 54.81 kg/m .    Wt Readings from Last 2 Encounters:   01/09/19 (!) 163.5 kg (360 lb 8 oz)   11/16/18 (!) 159.8 kg (352 lb 6.4 oz)     Past medical history of adjustment disorder with mixed anxiety and depressed mood, allergic rhinitis, eczema, essential hypertension, depression and prediabetes.    Growing up: heavy  Weight gain started: gradual  Diets tried: weight watchers- lost 25 lbs and gained back    Gastric bypass history: no  No wt loss medication in past    H/o preDm- on metformin    Hypothyroidism: no    Use of Steroids:No  Family history of Obesity:Yes: mother and sisters.  Diet: currently pt is working on-- healthy diet  Exercise:not structured regimen  Menses: on Depo shot  Diarrhea/Constipation:No  Changes in Hair or Skin:No  Sleep Apnea/Snores:No  Hypertension:No  Hyperlipidemia:No  Hirsutism:No  Easy Brusing:No    PMH/PSH:  Past Medical History:   Diagnosis Date     Menorrhagia      Obese      Past Surgical History:   Procedure Laterality Date     DILATION AND CURETTAGE, HYSTEROSCOPY DIAGNOSTIC, COMBINED N/A 6/23/2015    Procedure: COMBINED DILATION AND CURETTAGE, HYSTEROSCOPY DIAGNOSTIC;  Surgeon: Soraida Rosado MD;  Location: [facility] OR     Family Hx:  Family History   Problem Relation Age of Onset     Respiratory Maternal Grandmother         emphasema     Hypertension Maternal Grandmother      Other Cancer Maternal Grandmother         lung cancer     Cancer - colorectal Maternal Grandfather         Diagnosed at age 60     Hypertension Maternal Grandfather      Hypertension Mother      Other - See Comments Father         Not involved in her life     Arthritis Sister         SHAYLA     Pulmonary Embolism Sister         On Virginie, Mom thinks [facility] Leiden neg     Hypertension Maternal Uncle      Breast Cancer No family hx of   "    Social Hx:  Social History     Socioeconomic History     Marital status: Single     Spouse name: Not on file     Number of children: Not on file     Years of education: Not on file     Highest education level: Not on file   Social Needs     Financial resource strain: Not on file     Food insecurity - worry: Not on file     Food insecurity - inability: Not on file     Transportation needs - medical: Not on file     Transportation needs - non-medical: Not on file   Occupational History     Not on file   Tobacco Use     Smoking status: Never Smoker     Smokeless tobacco: Never Used   Substance and Sexual Activity     Alcohol use: No     Alcohol/week: 0.0 oz     Drug use: No     Sexual activity: No   Other Topics Concern      Service Not Asked     Blood Transfusions Not Asked     Caffeine Concern Not Asked     Occupational Exposure Not Asked     Hobby Hazards Not Asked     Sleep Concern Not Asked     Stress Concern Not Asked     Weight Concern Not Asked     Special Diet Not Asked     Back Care Not Asked     Exercise Not Asked     Bike Helmet Not Asked     Seat Belt Not Asked     Self-Exams Not Asked     Parent/sibling w/ CABG, MI or angioplasty before 65F 55M? No   Social History Narrative    moved from [place], [place] 2003; lives with mom and 2 sisters    flys, with mom, back to [place] to visit father 1-3x/month.  Doesn't like flying.        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        Enjoy reading (adventure)        Stays active by walking on trails, doing errands.           MEDICATIONS:  has a current medication list which includes the following prescription(s): medroxyprogesterone, metformin, and sertraline.    ROS     ROS: 10 point ROS neg other than the symptoms noted above in the HPI.    Physical Exam   VS: /84 (BP Location: Right arm, Patient Position: Chair, Cuff Size: Adult Large)   Pulse 104   Temp 98  F (36.7  C) (Oral)   Ht 1.727 m (5' 8\")  "  Wt (!) 163.5 kg (360 lb 8 oz)   SpO2 97%   Breastfeeding? No   BMI 54.81 kg/m    GENERAL: AXOX3, NAD, well dressed, answering questions appropriately, appears stated age.  HEENT: No exopthalmous, no proptosis, EOMI, no lig lag, no retraction  NECK: Thyroid normal in size, non tender, no nodules were palpated.  CV: RRR, no rubs, gallops, no murmurs  LUNGS: CTAB, no wheezes, rales, or ronchi  ABDOMEN: +BS, no hyperpigmented stretch marks  EXTREMITIES: no edema, +pulses, no rashes, no lesions  NEUROLOGY: CN grossly intact, + DTR upper and lower extremity, no tremors  MSK: grossly intact  SKIN: no rashes, no lesions    LABS:  Last Basic Metabolic Panel:  Lab Results   Component Value Date     07/27/2016      Lab Results   Component Value Date    POTASSIUM 4.4 07/27/2016     Lab Results   Component Value Date    CHLORIDE 109 07/27/2016     Lab Results   Component Value Date    MONICA 8.8 07/27/2016     Lab Results   Component Value Date    CO2 22 07/27/2016     Lab Results   Component Value Date    BUN 10 07/27/2016     Lab Results   Component Value Date    CR 0.62 07/27/2016     Lab Results   Component Value Date    GLC 68 07/27/2016       Lab Results   Component Value Date    TSH 2.38 09/08/2017       Lab Results   Component Value Date    A1C 5.7 11/16/2018    A1C 5.6 09/08/2017    A1C 6.1 07/27/2016    A1C 5.7 04/03/2015         All pertinent notes, labs, and images personally reviewed by me.     A/P  Ms.Holly Huerta is a 23 year old here for the evaluation of obestiy:    1. Obesity-    Body mass index is 54.81 kg/m .  Obesity is associated with a significant increase in mortality and risk of many disorders, including diabetes mellitus, hypertension, dyslipidemia, heart disease, stroke, sleep apnea, cancer, and many others. Conversely, weight loss is associated with a reduction in obesity-associated morbidity.  Endocrine evaluation of obesity includes Cushing's and thyroid dysfunction.  Will obtain TSH and  freeT4 along with 24 hour urine collection.   Obesity complicated by prediabetes.  She denies snoring or sleep apnea  She needs to work on diet and exercise.  Currently there is no structured diet regimen or exercise regimen  Plan  Labs as noted below  Health  referral  Dietitian referral  Consider medical weight loss management after above  Also discussed bariatric surgery.  Patient interested to attend patient information seminar.  Information given  TSH with free T4 reflex, Cortisol free urine,         WEIGHT MANAGEMENT INFO, HEALTH  REFERRAL,         NUTRITION REFERRAL        Discussed:  I informed the pt that:  1.Weight loss medications can be taken to assist with weight reduction when combined with appropriate healthy lifestyle changes.  2.I discussed possible s/e, risks and benefits of weight loss medications.  3.All medications are FDA approved, however, some may be used ''off label'' for their weight loss benefitIs and some ''short term'' medications can be used for longer periods to achieve desired clinical outcomes.  4.All patients taking weight loss medications must be seen in clinic for refill authorization.  Risks of obestiy discussed. Encourage healthy diet. Limit snacks, fluid calories, portions. Limit TV/computer time to one hour a day. Encourage physical activity. Recheck in six months or sooner with concerns.    Obesity is a biological preventable and treatable disease, which is associated with significantly increased risk of many acute and chronic health conditions. Obesity has now been recognized as a chronic disease by the American Medical Association.    A range of serious co-morbidities are associated with obesity including increased risk for hypertension, stroke, coronary artery disease, dyslipidemia, Type II diabetes, depression, sleep apnea, cancers of the colon, breast and endometrium, osteoarthritis and female infertility. Therefore, obesity is not just a problem; it s a  disease that warrants serious evidence based treatements.    I explained to the patient relevant work up for the diagnosis and management of obesity and discussed treatment options. Body Mass Index (BMI) has been a standard means for calculating risk for overweight and obesity. The new American Association of Clinical Endocrinology (AACE) algorithm recommends lifestyle modifications as the initial phase of treatment for all patients with the BMI equal or greater than 25 kg/m2. Lifestyle modifications includes use of medical nutrition therapy, exercise, tobacco cessation, adequate quality and quantity of sleep, limited consumption of alcohol and reduced stress through implementation of a structured, multidisciplinary program.    In patients with complications associated with obesity, graded interventions are recommended including pharmacological therapy such as phentermine, orlistat, lorcaserin and phentermine/topiramate ER, contrave ( buproprion/naltreone) and the use of very low calorie meal replacement programs.    If medical intervention is insufficient, surgical therapy may be considered, especially in patients with BMI greater than or equal to 35 kg/m2 with multiple complications. Surgical treatments include lap-band, gastric sleeve or gastric bypass surgery.      More than 50% of the time spent with Ms. Huerta on counseling / coordinating her care.        Follow-up:  After above    Leonila Jasmine MD  Endocrinology   [facility]    Addendum to above note and clinic visit:    Labs reviewed.    See result note/telephone encounter.          CC: Yohan Beltran

## 2019-01-09 NOTE — NURSING NOTE
"ENDOCRINOLOGY INTAKE FORM    Patient Name:  Susy Huerta  :  1995    Is patient Diabetic?   No  Does patient have non-diabetic or other endocrine issues?  Yes: obesity    Vitals: /84 (BP Location: Right arm, Patient Position: Chair, Cuff Size: Adult Large)   Pulse 104   Temp 98  F (36.7  C) (Oral)   Ht 1.727 m (5' 8\")   Wt (!) 163.5 kg (360 lb 8 oz)   SpO2 97%   Breastfeeding? No   BMI 54.81 kg/m    BMI= Body mass index is 54.81 kg/m .    Flu vaccine:  Yes: 20  Pneumonia vaccine:  No    Smoking and Alcohol use:  Social History     Tobacco Use     Smoking status: Never Smoker     Smokeless tobacco: Never Used   Substance Use Topics     Alcohol use: No     Alcohol/week: 0.0 oz     Drug use: No         OBESITY CONCERNS:  No ref. provider found       Initial Visit Previous Visit Current Change   Weight       Height       BMI         Weight at graduation of high school:   Diabetes:  No  Sleep Apnea/Snores: Yes: snores when really tired  Hypertension:  No  Hyperlipidemia:  No  Use of steroids:  No  Family history of obesity:  Yes:   Diet:  no  Exercise: No    Staff Signature:  Kerry Valdez CMA      "

## 2019-01-09 NOTE — LETTER
1/9/2019         RE: Susy Huerta  [address] [place] [address]        Dear Colleague,    Thank you for referring your patient, Susy Huerta, to the [facility]. Please see a copy of my visit note below.    Name: Susy Huerta  Seen in consultation with Yohan Beltran for obesity.  HPI:  Susy Huerta is a 23 year old female who presents for the evaluation of obestiy.  Body mass index is 54.81 kg/m .    Wt Readings from Last 2 Encounters:   01/09/19 (!) 163.5 kg (360 lb 8 oz)   11/16/18 (!) 159.8 kg (352 lb 6.4 oz)     Past medical history of adjustment disorder with mixed anxiety and depressed mood, allergic rhinitis, eczema, essential hypertension, depression and prediabetes.    Growing up: heavy  Weight gain started: gradual  Diets tried: weight watchers- lost 25 lbs and gained back    Gastric bypass history: no  No wt loss medication in past    H/o preDm- on metformin    Hypothyroidism: no    Use of Steroids:No  Family history of Obesity:Yes: mother and sisters.  Diet: currently pt is working on-- healthy diet  Exercise:not structured regimen  Menses: on Depo shot  Diarrhea/Constipation:No  Changes in Hair or Skin:No  Sleep Apnea/Snores:No  Hypertension:No  Hyperlipidemia:No  Hirsutism:No  Easy Brusing:No    PMH/PSH:  Past Medical History:   Diagnosis Date     Menorrhagia      Obese      Past Surgical History:   Procedure Laterality Date     DILATION AND CURETTAGE, HYSTEROSCOPY DIAGNOSTIC, COMBINED N/A 6/23/2015    Procedure: COMBINED DILATION AND CURETTAGE, HYSTEROSCOPY DIAGNOSTIC;  Surgeon: Soraida Rosado MD;  Location:  OR     Family Hx:  Family History   Problem Relation Age of Onset     Respiratory Maternal Grandmother         emphasema     Hypertension Maternal Grandmother      Other Cancer Maternal Grandmother         lung cancer     Cancer - colorectal Maternal Grandfather         Diagnosed at age 60     Hypertension Maternal Grandfather      Hypertension Mother       Other - See Comments Father         Not involved in her life     Arthritis Sister         SHAYLA     Pulmonary Embolism Sister         On Virginie, Mom thinks [facility] neg     Hypertension Maternal Uncle      Breast Cancer No family hx of      Social Hx:  Social History     Socioeconomic History     Marital status: Single     Spouse name: Not on file     Number of children: Not on file     Years of education: Not on file     Highest education level: Not on file   Social Needs     Financial resource strain: Not on file     Food insecurity - worry: Not on file     Food insecurity - inability: Not on file     Transportation needs - medical: Not on file     Transportation needs - non-medical: Not on file   Occupational History     Not on file   Tobacco Use     Smoking status: Never Smoker     Smokeless tobacco: Never Used   Substance and Sexual Activity     Alcohol use: No     Alcohol/week: 0.0 oz     Drug use: No     Sexual activity: No   Other Topics Concern      Service Not Asked     Blood Transfusions Not Asked     Caffeine Concern Not Asked     Occupational Exposure Not Asked     Hobby Hazards Not Asked     Sleep Concern Not Asked     Stress Concern Not Asked     Weight Concern Not Asked     Special Diet Not Asked     Back Care Not Asked     Exercise Not Asked     Bike Helmet Not Asked     Seat Belt Not Asked     Self-Exams Not Asked     Parent/sibling w/ CABG, MI or angioplasty before 65F 55M? No   Social History Narrative    moved from [place], [place] 2003; lives with mom and 2 sisters    flys, with mom, back to [place] to visit father 1-3x/month.  Doesn't like flying.        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        Enjoy reading (adventure)        Stays active by walking on trails, doing errands.           MEDICATIONS:  has a current medication list which includes the following prescription(s): medroxyprogesterone, metformin, and sertraline.    ROS     ROS: 10  "point ROS neg other than the symptoms noted above in the HPI.    Physical Exam   VS: /84 (BP Location: Right arm, Patient Position: Chair, Cuff Size: Adult Large)   Pulse 104   Temp 98  F (36.7  C) (Oral)   Ht 1.727 m (5' 8\")   Wt (!) 163.5 kg (360 lb 8 oz)   SpO2 97%   Breastfeeding? No   BMI 54.81 kg/m     GENERAL: AXOX3, NAD, well dressed, answering questions appropriately, appears stated age.  HEENT: No exopthalmous, no proptosis, EOMI, no lig lag, no retraction  NECK: Thyroid normal in size, non tender, no nodules were palpated.  CV: RRR, no rubs, gallops, no murmurs  LUNGS: CTAB, no wheezes, rales, or ronchi  ABDOMEN: +BS, no hyperpigmented stretch marks  EXTREMITIES: no edema, +pulses, no rashes, no lesions  NEUROLOGY: CN grossly intact, + DTR upper and lower extremity, no tremors  MSK: grossly intact  SKIN: no rashes, no lesions    LABS:  Last Basic Metabolic Panel:  Lab Results   Component Value Date     07/27/2016      Lab Results   Component Value Date    POTASSIUM 4.4 07/27/2016     Lab Results   Component Value Date    CHLORIDE 109 07/27/2016     Lab Results   Component Value Date    MONICA 8.8 07/27/2016     Lab Results   Component Value Date    CO2 22 07/27/2016     Lab Results   Component Value Date    BUN 10 07/27/2016     Lab Results   Component Value Date    CR 0.62 07/27/2016     Lab Results   Component Value Date    GLC 68 07/27/2016       Lab Results   Component Value Date    TSH 2.38 09/08/2017       Lab Results   Component Value Date    A1C 5.7 11/16/2018    A1C 5.6 09/08/2017    A1C 6.1 07/27/2016    A1C 5.7 04/03/2015         All pertinent notes, labs, and images personally reviewed by me.     A/P  Ms.Holly Huerta is a 23 year old here for the evaluation of obestiy:    1. Obesity-    Body mass index is 54.81 kg/m .  Obesity is associated with a significant increase in mortality and risk of many disorders, including diabetes mellitus, hypertension, dyslipidemia, heart " disease, stroke, sleep apnea, cancer, and many others. Conversely, weight loss is associated with a reduction in obesity-associated morbidity.  Endocrine evaluation of obesity includes Cushing's and thyroid dysfunction.  Will obtain TSH and freeT4 along with 24 hour urine collection.   Obesity complicated by prediabetes.  She denies snoring or sleep apnea  She needs to work on diet and exercise.  Currently there is no structured diet regimen or exercise regimen  Plan  Labs as noted below  Health  referral  Dietitian referral  Consider medical weight loss management after above  Also discussed bariatric surgery.  Patient interested to attend patient information seminar.  Information given  TSH with free T4 reflex, Cortisol free urine,         WEIGHT MANAGEMENT INFO, HEALTH  REFERRAL,         NUTRITION REFERRAL        Discussed:  I informed the pt that:  1.Weight loss medications can be taken to assist with weight reduction when combined with appropriate healthy lifestyle changes.  2.I discussed possible s/e, risks and benefits of weight loss medications.  3.All medications are FDA approved, however, some may be used ''off label'' for their weight loss benefitIs and some ''short term'' medications can be used for longer periods to achieve desired clinical outcomes.  4.All patients taking weight loss medications must be seen in clinic for refill authorization.  Risks of obestiy discussed. Encourage healthy diet. Limit snacks, fluid calories, portions. Limit TV/computer time to one hour a day. Encourage physical activity. Recheck in six months or sooner with concerns.    Obesity is a biological preventable and treatable disease, which is associated with significantly increased risk of many acute and chronic health conditions. Obesity has now been recognized as a chronic disease by the American Medical Association.    A range of serious co-morbidities are associated with obesity including increased risk for  hypertension, stroke, coronary artery disease, dyslipidemia, Type II diabetes, depression, sleep apnea, cancers of the colon, breast and endometrium, osteoarthritis and female infertility. Therefore, obesity is not just a problem; it s a disease that warrants serious evidence based treatements.    I explained to the patient relevant work up for the diagnosis and management of obesity and discussed treatment options. Body Mass Index (BMI) has been a standard means for calculating risk for overweight and obesity. The new American Association of Clinical Endocrinology (AACE) algorithm recommends lifestyle modifications as the initial phase of treatment for all patients with the BMI equal or greater than 25 kg/m2. Lifestyle modifications includes use of medical nutrition therapy, exercise, tobacco cessation, adequate quality and quantity of sleep, limited consumption of alcohol and reduced stress through implementation of a structured, multidisciplinary program.    In patients with complications associated with obesity, graded interventions are recommended including pharmacological therapy such as phentermine, orlistat, lorcaserin and phentermine/topiramate ER, contrave ( buproprion/naltreone) and the use of very low calorie meal replacement programs.    If medical intervention is insufficient, surgical therapy may be considered, especially in patients with BMI greater than or equal to 35 kg/m2 with multiple complications. Surgical treatments include lap-band, gastric sleeve or gastric bypass surgery.      More than 50% of the time spent with Ms. Huerta on counseling / coordinating her care.        Follow-up:  After above    Leonila Jasmine MD  Endocrinology   [facility]    Addendum to above note and clinic visit:    Labs reviewed.    See result note/telephone encounter.          CC: Yohan Bletran, thank you for allowing me to participate in the care of your patient.         Sincerely,        Leonila Jasmine MD

## 2019-01-09 NOTE — PATIENT INSTRUCTIONS
[facility] & [facility] locations   Dr Jasmine, Endocrinology Department      [facility]   [address]  [place] [address]  Appointment [phone]  Fax: [phone]  [place]: Monday and Tuesday         [facility]   [address]  [place] [address]  Appointment [phone]  Fax: [phone]  [place]: Wednesday and Thursday          Labs today  Need 24 hr urine collection  Health  referral in place  Follow up after above  The patient is advised to Make better food choices: reduce carbs, Reduce portion size, weight loss and exercise 3-4 times a week.        24 Hour Urine Collection    - During a 24-hour urine collection, follow your usual diet and drink fluids as you ordinarily would.  - Avoid drinking alcohol before and during the urine collection.    - For a 24-hour urine collection, all of the urine that you pass over a 24-hour time period must be collected. You will receive a special container to collect the sample.    The following are directions for collecting a 24-hour urine sample while at home:    In the morning scheduled to begin the urine collection, urinate in the toilet and flush away the first urine you pass. Write down the date and time. That is the start date and time for the collection.    Collect all urine you pass, day and night, for 24 hours. Use the container given to you to collect the urine. Avoid using other containers. The urine sample must include the last urine that you pass 24 hours after starting the collection. Do not allow toilet paper, stool, or anything else to be added to the urine sample.    Write down the date and time that the last sample is collected.    Health :  A health  will reach out to you over the telephone within the next 3 business days to answer any questions you may have and set up your first coaching session. If they are not able to  reach you please feel free to call back at the number left on your voicemail. Thank you and we are excited you are taking advantage of this wonderful opportunity to improve your health!   With this program, patients have an opportunity to work with a Health , over the phone at no cost for 6 coaching total sessions over a period of seven months.   [facility] use a non-judgmental collaborative approach when working with patients. Coaches support patients in gaining knowledge, skills, tools and confidence to become active participants in their health. Your  will work with you one-on-one to set small, achievable goals and provide accountability to help you meet your vision.   [facility] have had success supporting patients in the following areas:   * Chronic Disease Management   * Weight Management   * Nutrition   * Exercise   * Stress Management   * Other Health Related Goals          You can consider attending patient information seminar offered at [facility] bariat[facility].  Sessions are held several times a month at [facility].  You can get more information and you can register online:    [email or web address]

## 2019-01-10 LAB — TSH SERPL DL<=0.005 MIU/L-ACNC: 2.97 MU/L (ref 0.4–4)

## 2019-01-14 ENCOUNTER — PATIENT OUTREACH (OUTPATIENT)
Dept: NURSING | Facility: CLINIC | Age: 24
End: 2019-01-14

## 2019-01-14 ENCOUNTER — ALLIED HEALTH/NURSE VISIT (OUTPATIENT)
Dept: NURSING | Facility: CLINIC | Age: 24
End: 2019-01-14
Payer: COMMERCIAL

## 2019-01-14 DIAGNOSIS — Z30.9 ENCOUNTER FOR CONTRACEPTIVE MANAGEMENT, UNSPECIFIED TYPE: Primary | ICD-10-CM

## 2019-01-14 DIAGNOSIS — E66.01 CLASS 3 SEVERE OBESITY WITHOUT SERIOUS COMORBIDITY WITH BODY MASS INDEX (BMI) OF 50.0 TO 59.9 IN ADULT, UNSPECIFIED OBESITY TYPE (H): ICD-10-CM

## 2019-01-14 DIAGNOSIS — E66.813 CLASS 3 SEVERE OBESITY WITHOUT SERIOUS COMORBIDITY WITH BODY MASS INDEX (BMI) OF 50.0 TO 59.9 IN ADULT, UNSPECIFIED OBESITY TYPE (H): ICD-10-CM

## 2019-01-14 PROCEDURE — 99000 SPECIMEN HANDLING OFFICE-LAB: CPT | Performed by: INTERNAL MEDICINE

## 2019-01-14 PROCEDURE — 82530 CORTISOL FREE: CPT | Mod: 90 | Performed by: INTERNAL MEDICINE

## 2019-01-14 PROCEDURE — 99207 ZZC NO CHARGE NURSE ONLY: CPT

## 2019-01-14 PROCEDURE — 96372 THER/PROPH/DIAG INJ SC/IM: CPT

## 2019-01-14 RX ORDER — MEDROXYPROGESTERONE ACETATE 150 MG/ML
150 INJECTION, SUSPENSION INTRAMUSCULAR
Status: DISCONTINUED | OUTPATIENT
Start: 2019-01-14 | End: 2024-03-05

## 2019-01-14 RX ADMIN — MEDROXYPROGESTERONE ACETATE 150 MG: 150 INJECTION, SUSPENSION INTRAMUSCULAR at 16:38

## 2019-01-14 NOTE — PROGRESS NOTES
Prior to injection, verified patient identity using patient's name and date of birth.  Due to injection administration, patient instructed to remain in clinic for 15 minutes  afterwards, and to report any adverse reaction to me immediately. Patient is 3 days early for depo - verbal OK by Yohan Beltran MD to give early.    BP: Data Unavailable    LAST PAP/EXAM: No results found for: PAP  URINE HCG:not indicated    NEXT INJECTION DUE: 4/1/19 - 4/15/19     Drug Amount Wasted:  None.  Vial/Syringe: Single dose vial  Expiration Date:  05/2020  Socorro Kessler CMA

## 2019-01-14 NOTE — PROGRESS NOTES
Received Health Coaching Referral-Outreached patient for the first time and was able to connect.  Patient agrees to coaching and scheduled first appointment for 1/25/2019 by phone.    Branodn [facility]    1/14/2019    3:32 PM

## 2019-01-17 LAB
COLLECT DURATION TIME SPEC: 24 H
CORTIS F 24H UR HPLC-MCNC: 22.4 UG/L
CORTIS F 24H UR-MRATE: 28 UG/D
CORTIS F/CREAT 24H UR: 14.74 UG/G CRT
CREAT 24H UR-MRATE: 1900 MG/D (ref 700–1600)
CREAT UR-MCNC: 152 MG/DL
IMP & REVIEW OF LAB RESULTS: ABNORMAL
SPECIMEN VOL ?TM UR: 1250 ML

## 2019-01-25 ENCOUNTER — PATIENT OUTREACH (OUTPATIENT)
Dept: NURSING | Facility: CLINIC | Age: 24
End: 2019-01-25

## 2019-01-25 PROCEDURE — 99207 ZZC HEALTH COACHING, NO CHARGE: CPT

## 2019-01-25 NOTE — PROGRESS NOTES
January 25, 2019    Health Coaching Progress Note    Patient Name: Susy Huerta Date: January 25, 2019      Session Length: 60      DATA    PRM Master Survey Scores Reviewed: Yes    Core Healthy Days Survey:    Would you say that in general your health is: : Good    MANOLO Score (Last Two) 1/25/2019   MANOLO Raw Score 31   Activation Score 59.3   MANOLO Level 3       PHQ-2 Score 1/25/2019 11/16/2018   PHQ-2 Total Score (Adult) - Positive if 3 or more points; Administer PHQ-9 if positive 2 0   MyC PHQ-2 Score - 0       PHQ-9 SCORE 9/19/2017 4/21/2018   PHQ-9 Total Score - -   PHQ-9 Total Score 3 8       Treatment Objective(s) Addressed in This Session:  Target Behavior(s): diet/weight loss and disease management/lifestyle changes of working on being more physically active each day by tracking her daily step count using her smart phone, completing regular set exercise like walking stairs more, completing exercise workout videos at home, or provided list for indoor walking options 2-3 days per week, being more aware of her food choices-eating less carbs, smaller portions with smaller meals more frequently, making healthier choices, drinking more water daily, accessing health resources and attending appointments as scheduled.    Current Stressors / Issues:  Recently moved to a new apartment/[facility] as her old condo had a workout facility and new one does not, not a huge fan of cold weather/scared of falling on the ice, limited options for exercise right now, learning more about healthier eating and keeping less junk food/sweets around her house, weight loss and maintenance, blood sugars are in pre-diabetic zone  What Patient Does Well:   1) Patient is motivated to improve her health by losing weight-she feels this will help improve other aspects of her health and how she feels.  Previous Successes:   Patient states she was able to lose some weight in the past using the Weight Watchers eating plan, but wasn't able to  maintain  Areas in Need of Improvement:   1) Regular exercise-walking the stairs where she lives, exercise tapes, indoor walking options  2) Healthier eating-smaller portions, less carbs, eating small meals more frequently  3) More water intake  Barriers to Change:   1) Moved to new location/old [facility] had a gym facility in it  2) Doesn't have access to exercise equipment currently/enjoys walking outside when weather is nice  2) Has struggled with weight for most of her life-tried weight watchers and was able to lose 25lbs but it came back  Reasons for Change:   1) Lose weight  2) Be healthier   3) Prevent other health issues down the road  Plan/Goal for the Next 4 Weeks:   Goals Addressed as of 1/25/2019 at 2:13 PM                 Today      Being Active (pt-stated)   0%    Added 1/25/19 by Brandon Nicole     My Goal: I will start tracking my daily step count each day and find my daily step count average    What I need to meet my goal: Smartphone/Step tracking Lily    I plan to meet my goal by this date: Next Coaching Session          Being Active (pt-stated)   0%    Added 1/25/19 by Brandon Nicole     My Goal: I will complete some form of set exercise (exercise DVD's, going up and down stairs, or walking) 2-3 days per week    What I need to meet my goal: Set aside time for exercise    I plan to meet my goal by this date: Next Coaching Session          Healthy Eating (pt-stated)   0%    Added 1/25/19 by Brandon Nicole     My Goal: I will be more aware of my carbohydrate intake in grams at meals and snacks to make healthier choices    What I need to meet my goal: Awareness/reading food labels    I plan to meet my goal by this date: Next Coaching Session        Water Intake (pt-stated)   0%    Added 1/25/19 by Brandon Nicole     My Goal: I will drink more water daily and track my intake by bottle or in ounces each day    What I need to meet my goal: Water bottle, tracking intake    I plan to meet my goal by this date:  Next Coaching Session            Intervention:  Motivational Interviewing    MI Intervention: Expressed Empathy/Understanding, Supported Autonomy, Collaboration, Evocation, Permission to raise concern or advise, Open-ended questions, Reflections: simple and complex and Change talk (evoked)     Change Talk Expressed by the Patient: Desire to change Ability to change Reasons to change Need to change Committment to change Activation Taking steps    Provider Response to Change Talk: E - Evoked more info from patient about behavior change, A - Affirmed patient's thoughts, decisions, or attempts at behavior change, R - Reflected patient's change talk and S - Summarized patient's change talk statements    Assessment / Progress on Treatment Objective(s) / Homework:    New Objective established this session - PREPARATION (Decided to change - considering how); Intervened by negotiating a change plan and determining options / strategies for behavior change, identifying triggers, exploring social supports, and working towards setting a date to begin behavior change     Plan: (Homework, other):  Patient was encouraged to continue to seek condition-related information and education, as well as schedule a follow up appointment with the Health  in 4 weeks. Patient has set self-identified goals and will monitor progress until the next appointment.  Scheduled our next coaching call for Friday February 22nd at 2pm.      [facility]    1/25/2019    2:14 PM

## 2019-02-12 ENCOUNTER — OFFICE VISIT (OUTPATIENT)
Dept: PEDIATRICS | Facility: CLINIC | Age: 24
End: 2019-02-12
Payer: COMMERCIAL

## 2019-02-12 VITALS
SYSTOLIC BLOOD PRESSURE: 130 MMHG | OXYGEN SATURATION: 98 % | TEMPERATURE: 98.9 F | HEART RATE: 93 BPM | HEIGHT: 68 IN | BODY MASS INDEX: 44.41 KG/M2 | DIASTOLIC BLOOD PRESSURE: 86 MMHG | WEIGHT: 293 LBS

## 2019-02-12 DIAGNOSIS — H60.391 INFECTIVE OTITIS EXTERNA, RIGHT: Primary | ICD-10-CM

## 2019-02-12 PROCEDURE — 99213 OFFICE O/P EST LOW 20 MIN: CPT | Performed by: PHYSICIAN ASSISTANT

## 2019-02-12 RX ORDER — NEOMYCIN SULFATE, POLYMYXIN B SULFATE AND HYDROCORTISONE 10; 3.5; 1 MG/ML; MG/ML; [USP'U]/ML
4 SUSPENSION/ DROPS AURICULAR (OTIC) 4 TIMES DAILY
Qty: 10 ML | Refills: 0 | Status: SHIPPED | OUTPATIENT
Start: 2019-02-12 | End: 2019-04-16

## 2019-02-12 RX ORDER — DOXYCYCLINE HYCLATE 100 MG
100 TABLET ORAL 2 TIMES DAILY
Qty: 14 TABLET | Refills: 0 | Status: SHIPPED | OUTPATIENT
Start: 2019-02-12 | End: 2019-04-16

## 2019-02-12 ASSESSMENT — MIFFLIN-ST. JEOR: SCORE: 2441.46

## 2019-02-12 NOTE — PROGRESS NOTES
"  SUBJECTIVE:   Susy Huerta is a 23 year old female, accompanied by mother, who presents to clinic today for the following health issues:    Acute Illness   Acute illness concerns: ear  Onset: 2 days    Fever: no    Chills/Sweats: no    Headache (location?): YES- rt temporal    Sinus Pressure:YES- post-nasal drainage and facial pain    Conjunctivitis:  no    Ear Pain: YES: bilateral    Rhinorrhea: no    Congestion: YES- nasal    Sore Throat: no     Cough: YES-productive of yellow sputum    Wheeze: YES    Decreased Appetite: no    Nausea: no     Vomiting: no     Diarrhea:  no     Dysuria/Freq.: no     Fatigue/Achiness: no     Sick/Strep Exposure: no      Therapies Tried and outcome: none    ROS:  ROS otherwise negative    OBJECTIVE:                                                    /86 (BP Location: Other (Comment), Patient Position: Sitting, Cuff Size: Adult Regular)   Pulse 93   Temp 98.9  F (37.2  C) (Tympanic)   Ht 1.727 m (5' 8\")   Wt (!) 162.8 kg (358 lb 14.4 oz)   SpO2 98%   BMI 54.57 kg/m    Body mass index is 54.57 kg/m .   GENERAL: alert, no distress  HENT: ear canals- erythemic and edematous in the right ear canal; TMs- normal; Nose- normal; Mouth- no ulcers, no lesions  NECK: right tonsillar LAD  RESP: lungs clear to auscultation - no rales, no rhonchi, no wheezes  CV: regular rates and rhythm, normal S1 S2, no S3 or S4 and no murmur, no click or rub    Diagnostic test results:  No results found for this or any previous visit (from the past 24 hour(s)).     ASSESSMENT/PLAN:                                                    (H60.391) Infective otitis externa, right  (primary encounter diagnosis)  Comment: begin antibiotics and ear drops as directed.  Plan: neomycin-polymyxin-hydrocortisone (CORTISPORIN)        [id number] otic suspension, doxycycline         hyclate (VIBRA-TABS) 100 MG tablet          Nathaly Mcclellan PA-C  [facility]  "

## 2019-02-13 ENCOUNTER — TELEPHONE (OUTPATIENT)
Dept: PEDIATRICS | Facility: CLINIC | Age: 24
End: 2019-02-13

## 2019-02-13 DIAGNOSIS — H60.393 INFECTIVE OTITIS EXTERNA, BILATERAL: Primary | ICD-10-CM

## 2019-02-13 NOTE — TELEPHONE ENCOUNTER
Mother calling in for patient.  Mother stated patient is having drainage from left ear. Wanting to know if drops that were prescribed for right ear yesterday could be placed in the left ear as well?    Patient having clear liquid coming from left ear with pain in ear and jaw.    Patient may have preferred left ear drum. Advised not to use drops in that ear and to continue with antibiotic that were prescribed yesterday for right ear infection, use a warm wash cloth and Tylenol/Ibprofen for pain.    Patient/mother expressed understanding and acceptance of the plan.  Pt had no further questions at this time.  Advised can call back to clinic at any time with concerns.     Soraida Lackey RN Flex

## 2019-02-14 RX ORDER — CIPROFLOXACIN AND DEXAMETHASONE 3; 1 MG/ML; MG/ML
4 SUSPENSION/ DROPS AURICULAR (OTIC) 2 TIMES DAILY
Qty: 7.5 ML | Refills: 0 | Status: SHIPPED | OUTPATIENT
Start: 2019-02-14 | End: 2019-04-16

## 2019-02-14 NOTE — TELEPHONE ENCOUNTER
Pt calling to update:    - now has severe pain in L ear(more than R ear)  - has continuous yellow discharge from L ear, muffled hearing, severe pain, pressure, jaw pain & mild dizziness only while bending over  - denies bleeding, hearing loss, balancing issues, ringing in the ear, fever, chills, dizziness, vomiting, HA  - is currently taking abx(doxy) as rx'ed, using ear drops only in R ear & tylenol(not helping with pain)  - requesting something for L ear pain(ear drops, oral med)    Please advise. Need to notify pt at [phone](OK to LM) with provider's advise.    Chris, RN  Triage Nurse

## 2019-02-14 NOTE — TELEPHONE ENCOUNTER
Called pt back & advised as per 's advise. Pt agrees to the plan.    Offered to schedule a f/uy appointment with SDP tomorrow. Pt is unable to come in before 4, so scheduled an appointment with (open slot was available) at 4 pm. Pt will call to cancel, if feeling better.    Chris, RN  Triage Nurse

## 2019-02-14 NOTE — TELEPHONE ENCOUNTER
Would change to ciprodex ear drops if concern for ear perforation, sent to pharmacy. Would do tylenol or ibuprofen for pain (whatever patient tolerates). Ear pain relief drops have been pulled from the market.     Recheck if pain so severe that not controlled with above and not improving. Reasonable to schedule recheck tomorrow and can cancel if better.    Fausto Menon MD

## 2019-03-05 ENCOUNTER — PATIENT OUTREACH (OUTPATIENT)
Dept: NURSING | Facility: CLINIC | Age: 24
End: 2019-03-05

## 2019-03-05 NOTE — PROGRESS NOTES
Outreached patient to follow-up on No Show of our last scheduled Health Coaching appointment, but wasn't able to connect. Left message for call back.    Brandon Nicole, Health    3/5/2019    2:48 PM

## 2019-03-12 ENCOUNTER — ALLIED HEALTH/NURSE VISIT (OUTPATIENT)
Dept: NURSING | Facility: CLINIC | Age: 24
End: 2019-03-12
Payer: COMMERCIAL

## 2019-03-12 DIAGNOSIS — Z23 NEED FOR VACCINATION: Primary | ICD-10-CM

## 2019-03-12 PROCEDURE — 90471 IMMUNIZATION ADMIN: CPT

## 2019-03-12 PROCEDURE — 99207 ZZC NO CHARGE NURSE ONLY: CPT

## 2019-03-12 PROCEDURE — 90715 TDAP VACCINE 7 YRS/> IM: CPT

## 2019-03-12 NOTE — NURSING NOTE
Screening Questionnaire for Adult Immunization    Are you sick today?   No   Do you have allergies to medications, food, a vaccine component or latex?   No   Have you ever had a serious reaction after receiving a vaccination?   No   Do you have a long-term health problem with heart disease, lung disease, asthma, kidney disease, metabolic disease (e.g. diabetes), anemia, or other blood disorder?   No   Do you have cancer, leukemia, HIV/AIDS, or any other immune system problem?   No   In the past 3 months, have you taken medications that affect  your immune system, such as prednisone, other steroids, or anticancer drugs; drugs for the treatment of rheumatoid arthritis, Crohn s disease, or psoriasis; or have you had radiation treatments?   No   Have you had a seizure, or a brain or other nervous system problem?   No   During the past year, have you received a transfusion of blood or blood     products, or been given immune (gamma) globulin or antiviral drug?   No   For women: Are you pregnant or is there a chance you could become        pregnant during the next month?   No   Have you received any vaccinations in the past 4 weeks?   No     Immunization questionnaire answers were all negative.        Per orders of Dr. Beltran, injection of Adacel given by Jong Ohara. Patient instructed to remain in clinic for 15 minutes afterwards, and to report any adverse reaction to me immediately.       Screening performed by Jong Ohara on 3/12/2019 at 9:10 AM.

## 2019-03-13 ENCOUNTER — PATIENT OUTREACH (OUTPATIENT)
Dept: NURSING | Facility: CLINIC | Age: 24
End: 2019-03-13

## 2019-03-13 NOTE — PROGRESS NOTES
Outreached patient for second time, but wasn't able to connect as phone line was busy on several attempts.  Unable to leave a message for call back as I wasn't able to get through.  Will try again another time.    Brandon Nicole, Health    3/13/2019    2:42 PM

## 2019-03-22 ENCOUNTER — PATIENT OUTREACH (OUTPATIENT)
Dept: NURSING | Facility: CLINIC | Age: 24
End: 2019-03-22

## 2019-03-22 NOTE — PROGRESS NOTES
Outreached patient for third and final time,but wasn't able to connect as phone line was busy on several attempts.  Unable to leave a message for call back as I wasn't able to get through.      Brandon Nicole, Health    3/22/2019    3:57 PM

## 2019-04-01 ENCOUNTER — MYC MEDICAL ADVICE (OUTPATIENT)
Dept: PEDIATRICS | Facility: CLINIC | Age: 24
End: 2019-04-01

## 2019-04-01 ENCOUNTER — ALLIED HEALTH/NURSE VISIT (OUTPATIENT)
Dept: NURSING | Facility: CLINIC | Age: 24
End: 2019-04-01
Payer: COMMERCIAL

## 2019-04-01 ENCOUNTER — TELEPHONE (OUTPATIENT)
Dept: PEDIATRICS | Facility: CLINIC | Age: 24
End: 2019-04-01

## 2019-04-01 VITALS — SYSTOLIC BLOOD PRESSURE: 131 MMHG | HEART RATE: 103 BPM | DIASTOLIC BLOOD PRESSURE: 83 MMHG

## 2019-04-01 DIAGNOSIS — Z30.42 ENCOUNTER FOR SURVEILLANCE OF INJECTABLE CONTRACEPTIVE: Primary | ICD-10-CM

## 2019-04-01 PROCEDURE — 99207 ZZC NO CHARGE NURSE ONLY: CPT

## 2019-04-01 PROCEDURE — 96372 THER/PROPH/DIAG INJ SC/IM: CPT

## 2019-04-01 RX ADMIN — MEDROXYPROGESTERONE ACETATE 150 MG: 150 INJECTION, SUSPENSION INTRAMUSCULAR at 14:10

## 2019-04-01 NOTE — TELEPHONE ENCOUNTER
"Pt has been receiving depo shots since 2015. Checked with FEDE Caldwell who administered the depo inj for pt today. She clarifies that she is sure that she administered inj in \"Right Ventrogluteal\", not the abdomen.     Called pt back to get details. Pt says that she received the inj in fatty muscles on the R front towards her side, not on her hip. Also mentions that where she received the inj was full fatty tissues, no muscles.     Advised pt to abram the spot with pen where she received the inj & take a picture and send it through . So that we can visualize the administration site & advise on the efficacy of the med. Pt agrees to the plan. Will await for the  message.     She has never sent attachment through . If she is unable to send us a picture, she might need to stop by for one of the triage nurses to eval the inj site.     Vital Signs 2/12/2019   Weight (LB) 358 lb 14.4 oz     Chris RN  Triage Nurse          "

## 2019-04-01 NOTE — TELEPHONE ENCOUNTER
Patient got her depo today. Patient states it was injected into a fatty part of her stomach instead of a muscle. Patient wants to know if it I will still work as well.     Leni STILLF - TC/FD  4/1/2019 2:42 PM

## 2019-04-01 NOTE — PROGRESS NOTES
Prior to injection, verified patient identity using patient's name and date of birth.  Due to injection administration, patient instructed to remain in clinic for 15 minutes  afterwards, and to report any adverse reaction to me immediately.    BP: 131/83    LAST PAP/EXAM: No results found for: PAP  URINE HCG:not indicated    NEXT INJECTION DUE: 6/17/19 - 7/1/19         Drug Amount Wasted:  None.  Vial/Syringe: Single dose vial  Expiration Date:

## 2019-04-02 NOTE — TELEPHONE ENCOUNTER
Patient sent photo of injection site in following my chart message.  Message reviewed with Brigida TERRELL    Message handled by Nurse Triage with [facility] - provider name: Brigida TERRELL. PCS - advised to discuss medication with Dr. Menon in PCP absense    Message handled by Nurse Triage with [facility] - provider name: Dr. Menon     Advised to huddle with MTM regarding medication concerns and when safe to administer    Contact patient if appropriate that medication needs to be re administered and note that she would not be charged for the second administration.      Message handled by Nurse Triage with [facility]uddle - provider name: [facility] - Marcela España   Spoke with Marcela who notes there is also a sub Q formulation which is a lower dose given Sub Q but the same formulation and frequency as the IM injection.  Notes that side effects should be monitored due to giving a larger dose sub Q than what is noted on sub Q injection.      Call  to find out any additional concerns.        Message handled by Nurse Triage with [facility] - provider name: [facility]  [facility] [phone]    Spoke with Cathi at [facility] who notes that concerns with injection given Sub Q vs. IM are noted reactions at the site of injection.      Notes that effectives and need repeat dosing would need to be advised by a health care provider.      Message handled by Nurse Triage with [facility] - provider name: Dr. Menon advised should not need an additional injection.  Use barrier protection as well until next injection is due.       Contacted patient who will monitor her injection site, use barrier method and call us with any concerns or changes.    Sabrina CASE RN - Triage  [facility]    .

## 2019-04-15 ENCOUNTER — TELEPHONE (OUTPATIENT)
Dept: PEDIATRICS | Facility: CLINIC | Age: 24
End: 2019-04-15

## 2019-04-15 NOTE — TELEPHONE ENCOUNTER
"Dr. Beltran, can we use one of your same day   Patient has had rectal pain for the past 3-4 days.  Has noted \"something hard\" near anal opening.   Describes this as the size of a small marble.  No rectal bleeding.  Patient has a history of loose stools and frequent bowel movements.  Appointment advised and scheduled tomorrow am  MANPREET Ye RN      "

## 2019-04-15 NOTE — TELEPHONE ENCOUNTER
Reason for call:  Symptom   Symptom or request: inflammation and pain near backside (butt)    Duration (how long have symptoms been present): 3-4 days  Have you been treated for this before? No    Additional comments: Patient would like a callback from nurse.    Phone number to reach patient:  Other phone number:  [phone]    Best Time:  any    Can we leave a detailed message on this number?  NO

## 2019-04-16 ENCOUNTER — OFFICE VISIT (OUTPATIENT)
Dept: PEDIATRICS | Facility: CLINIC | Age: 24
End: 2019-04-16
Payer: COMMERCIAL

## 2019-04-16 VITALS
HEART RATE: 86 BPM | WEIGHT: 293 LBS | BODY MASS INDEX: 41.95 KG/M2 | TEMPERATURE: 98.3 F | SYSTOLIC BLOOD PRESSURE: 136 MMHG | OXYGEN SATURATION: 98 % | DIASTOLIC BLOOD PRESSURE: 70 MMHG | HEIGHT: 70 IN

## 2019-04-16 DIAGNOSIS — K64.4 EXTERNAL HEMORRHOID: Primary | ICD-10-CM

## 2019-04-16 PROCEDURE — 99213 OFFICE O/P EST LOW 20 MIN: CPT | Performed by: INTERNAL MEDICINE

## 2019-04-16 ASSESSMENT — MIFFLIN-ST. JEOR: SCORE: 2491.34

## 2019-04-16 NOTE — PROGRESS NOTES
"  SUBJECTIVE:   Susy Huerta is a 23 year old female who presents to clinic today for the following   health issues:    Concern - Rectal Pain  Onset: 4days    Description:   Intermittent pain, can be sharp. Near the anus. No bleeding. Small nodule palpated.     Intensity: moderate    Progression of Symptoms:  same    Accompanying Signs & Symptoms:  No bleeding, discharge    Previous history of similar problem:   no    Precipitating factors:   Worsened by: pressure, BM    Alleviating factors:  Improved by: nothing specific    Therapies Tried and outcome: no meds tried      OBJECTIVE:     /70 (BP Location: Right arm, Patient Position: Sitting, Cuff Size: Adult Large)   Pulse 86   Temp 98.3  F (36.8  C) (Tympanic)   Ht 1.77 m (5' 9.69\")   Wt (!) 165.1 kg (364 lb)   SpO2 98%   BMI 52.70 kg/m    Body mass index is 52.7 kg/m .  GEN: no distress  SKIN: no rashes  RECTAL: Small hemorrhoid noted at 3:00 position. No bleeding or erythema noted.     ASSESSMENT/PLAN:       ICD-10-CM    1. External hemorrhoid K64.4    2. BMI 50.0-59.9, adult (H) Z68.43      Clinically c/w external hemorrhoid. No infection signs noted.  Discussed management.   Fiber, fluids, gentle wiping, weight loss especially with morbid obesity.  Handout given.  Call if sx progress or do not improve for colorectal referral.       Yohan Beltran MD  [facility], [place] [facility]      "

## 2019-04-16 NOTE — PATIENT INSTRUCTIONS
Patient Education     Treating Hemorrhoids: Self-Care    Follow your healthcare provider s advice about caring for your hemorrhoids at home. Some treatments help relieve symptoms right away. Others involve making changes in your diet and exercise habits. These can help ease constipation and prevent hemorrhoid symptoms from coming back.  Relieving symptoms  Your healthcare provider may prescribe anti-inflammatory medicine to help ease your symptoms. The following tips will also help relieve pain and swelling.    Take sitz baths. Taking a sitz bath means sitting in a few inches of warm bath water. Soaking for 10 minutes twice a day can provide welcome relief from painful hemorrhoids. It can also help the area stay clean.    Develop good bowel habits. Use the bathroom when you need to. Don t ignore the urge to move your bowels. This can lead to constipation, hard stools, and straining. Also, don t read while on the toilet. Sit only as long as needed. Wipe gently with soft, unscented toilet tissue or baby wipes.    Use ice packs. Placing an ice pack on a thrombosed external hemorrhoid can help relieve pain right away. It will also help reduce the blood clot. Use the ice for 15 to 20 minutes at a time. Keep a cloth between the ice and your skin to prevent skin damage.    Use other measures. Laxatives and enemas can help ease constipation. But use them only on your healthcare provider s advice. For symptom relief, try using cotton pads soaked in witch hazel. These are available at most drugstores. Over-the-counter hemorrhoid ointments and petroleum jelly can also provide relief.  Add fiber to your diet  Adding fiber to your diet can help relieve constipation by making stools softer and easier to pass. To increase your fiber intake, your healthcare provider may recommend a bulking agent, such as psyllium. This is a high-fiber supplement available at most grocery stores and drugstores. Eating more fiber-rich foods will  also help. There are two types of fiber:    Insoluble fiber is the main ingredient in bulking agents. It s also found in foods such as wheat bran, whole-grain breads, fresh fruits, and vegetables.    Soluble fiber is found in foods such as oat bran. Although soluble fiber is good for you, it may not ease constipation as much as foods high in insoluble fiber.  Drink more water  Along with a high-fiber diet, drinking more water can help ease constipation. This is because insoluble fiber absorbs water, making stools soft and bulky. Be sure to drink plenty of water throughout the day. Drinking fruit juices, such as prune juice or apple juice, can also help prevent constipation.  Get more exercise  Regular exercise aids digestion and helps prevent constipation. It s also great for your health. So talk with your healthcare provider about starting an exercise program. Low-impact activities, such as swimming or walking, are good places to start. Take it easy at first. And remember to drink plenty of water when you exercise.  High-fiber foods  High-fiber foods offer many benefits. By making your stools softer, they help heal and prevent swollen hemorrhoids. They may also help reduce the risk of colon and rectal cancer. Best of all, they re usually low in calories and taste great. Here are some examples of fiber-rich foods.    Whole grains, such as wheat bran, corn bran, and brown rice.    Vegetables, especially carrots, broccoli, cabbage, and peas.    Fruits, such as apples, bananas, raisins, peaches, and pears.    Nuts and legumes, especially peanuts, lentils, and kidney beans.  Easy ways to add fiber  The tips below offer some simple ways to add more high-fiber foods to your meals.    Start your day with a high-fiber breakfast. Eat a wheat bran cereal along with a sliced banana. Or, try peanut butter on whole-wheat toast.    Eat carrot sticks for snacks. They re easy to prepare, taste great, and are low in calories.    Use  whole-grain breads instead of white bread for sandwiches.    Eat fruits for treats. Try an apple and some raisins instead of a candy bar.   Date Last Reviewed: 7/1/2016 2000-2018 [employer]. [address] [place] [address], [place] [address]. All rights reserved. This information is not intended as a substitute for professional medical care. Always follow your healthcare professional's instructions.

## 2019-05-13 DIAGNOSIS — F43.23 ADJUSTMENT DISORDER WITH MIXED ANXIETY AND DEPRESSED MOOD: ICD-10-CM

## 2019-05-13 NOTE — TELEPHONE ENCOUNTER
"Requested Prescriptions   Pending Prescriptions Disp Refills     sertraline (ZOLOFT) 100 MG tablet  Last Written Prescription Date:  4/17/18  Last Fill Quantity: 180,  # refills: 3    Last office visit: 4/16/2019 with prescribing provider:  Yohan Beltran MD       Future Office Visit:   Next 5 appointments (look out 90 days)    Jul 01, 2019  4:00 PM CDT  Nurse Only with EA NURSE AB  [facility] ([facility] [address] [place] [address]  [address]  [facility] [id number] [phone]          180 tablet 3     Sig: Take 2 tablets (200 mg) by mouth daily       SSRIs Protocol Passed - 5/13/2019  1:37 PM  PHQ-9 SCORE 9/8/2017 9/19/2017 4/21/2018   PHQ-9 Total Score - - -   PHQ-9 Total Score 8 3 8     RAMIRO-7 SCORE 9/8/2017 9/19/2017 4/21/2018   Total Score - - -   Total Score 2 2 5               Passed - Recent (12 mo) or future (30 days) visit within the authorizing provider's specialty     Patient had office visit in the last 12 months or has a visit in the next 30 days with authorizing provider or within the authorizing provider's specialty.  See \"Patient Info\" tab in inbasket, or \"Choose Columns\" in Meds & Orders section of the refill encounter.              Passed - Medication is active on med list        Passed - Patient is age 18 or older        Passed - No active pregnancy on record        Passed - No positive pregnancy test in last 12 months          "

## 2019-05-15 RX ORDER — SERTRALINE HYDROCHLORIDE 100 MG/1
200 TABLET, FILM COATED ORAL DAILY
Qty: 180 TABLET | Refills: 3 | Status: SHIPPED | OUTPATIENT
Start: 2019-05-15 | End: 2020-07-23

## 2019-05-15 ASSESSMENT — ANXIETY QUESTIONNAIRES
5. BEING SO RESTLESS THAT IT IS HARD TO SIT STILL: NOT AT ALL
2. NOT BEING ABLE TO STOP OR CONTROL WORRYING: SEVERAL DAYS
6. BECOMING EASILY ANNOYED OR IRRITABLE: NOT AT ALL
1. FEELING NERVOUS, ANXIOUS, OR ON EDGE: NOT AT ALL
7. FEELING AFRAID AS IF SOMETHING AWFUL MIGHT HAPPEN: NOT AT ALL
GAD7 TOTAL SCORE: 2
3. WORRYING TOO MUCH ABOUT DIFFERENT THINGS: SEVERAL DAYS

## 2019-05-15 ASSESSMENT — PATIENT HEALTH QUESTIONNAIRE - PHQ9
5. POOR APPETITE OR OVEREATING: NOT AT ALL
SUM OF ALL RESPONSES TO PHQ QUESTIONS 1-9: 7

## 2019-05-16 ASSESSMENT — ANXIETY QUESTIONNAIRES: GAD7 TOTAL SCORE: 2

## 2019-07-02 ENCOUNTER — ALLIED HEALTH/NURSE VISIT (OUTPATIENT)
Dept: NURSING | Facility: CLINIC | Age: 24
End: 2019-07-02
Payer: COMMERCIAL

## 2019-07-02 DIAGNOSIS — Z30.9 ENCOUNTER FOR CONTRACEPTIVE MANAGEMENT, UNSPECIFIED TYPE: Primary | ICD-10-CM

## 2019-07-02 PROCEDURE — 96372 THER/PROPH/DIAG INJ SC/IM: CPT

## 2019-07-02 PROCEDURE — 99207 ZZC NO CHARGE NURSE ONLY: CPT

## 2019-07-02 RX ADMIN — MEDROXYPROGESTERONE ACETATE 150 MG: 150 INJECTION, SUSPENSION INTRAMUSCULAR at 10:30

## 2019-07-02 NOTE — PROGRESS NOTES
BP: Data Unavailable    LAST PAP/EXAM: No results found for: PAP  URINE HCG:not indicated    The following medication was given:     MEDICATION: Depo Provera 150mg  ROUTE: IM  SITE: Ventrogluteal - Left  : [employer]  LOT #: [id number]  EXP:06/20  NEXT INJECTION DUE: 9/17/19 - 10/1/19   Provider: Yohan Beltran MA

## 2019-07-10 ENCOUNTER — MYC MEDICAL ADVICE (OUTPATIENT)
Dept: PEDIATRICS | Facility: CLINIC | Age: 24
End: 2019-07-10

## 2019-09-17 ENCOUNTER — ALLIED HEALTH/NURSE VISIT (OUTPATIENT)
Dept: NURSING | Facility: CLINIC | Age: 24
End: 2019-09-17
Payer: COMMERCIAL

## 2019-09-17 PROCEDURE — 99207 ZZC NO CHARGE NURSE ONLY: CPT

## 2019-09-17 PROCEDURE — 96372 THER/PROPH/DIAG INJ SC/IM: CPT

## 2019-09-17 RX ADMIN — MEDROXYPROGESTERONE ACETATE 150 MG: 150 INJECTION, SUSPENSION INTRAMUSCULAR at 08:20

## 2019-09-17 NOTE — PROGRESS NOTES
Clinic Administered Medication Documentation    MEDICATION LIST:   Depo Provera Documentation    Prior to injection, verified patient identity using patient's name and date of birth. Medication was administered. Please see MAR and medication order for additional information. Patient instructed to remain in clinic for 15 minutes.    BP: Data Unavailable    LAST PAP/EXAM: No results found for: PAP  URINE HCG:negative 1/13/17    NEXT INJECTION DUE: 12/5/19 - 12/19/19    Was entire vial of medication used? Yes  Vial/Syringe: Single dose vial  Expiration Date:  06/2020    Mercedez Green MA//

## 2019-10-29 ENCOUNTER — ALLIED HEALTH/NURSE VISIT (OUTPATIENT)
Dept: NURSING | Facility: CLINIC | Age: 24
End: 2019-10-29
Payer: COMMERCIAL

## 2019-10-29 DIAGNOSIS — Z23 NEED FOR PROPHYLACTIC VACCINATION AND INOCULATION AGAINST INFLUENZA: Primary | ICD-10-CM

## 2019-10-29 PROCEDURE — 90471 IMMUNIZATION ADMIN: CPT

## 2019-10-29 PROCEDURE — 99207 ZZC NO CHARGE NURSE ONLY: CPT

## 2019-10-29 PROCEDURE — 90686 IIV4 VACC NO PRSV 0.5 ML IM: CPT

## 2019-11-29 DIAGNOSIS — R73.03 PRE-DIABETES: ICD-10-CM

## 2019-12-02 NOTE — TELEPHONE ENCOUNTER
"Requested Prescriptions   Pending Prescriptions Disp Refills     metFORMIN (GLUCOPHAGE) 500 MG tablet 180 tablet 3     Sig: Take 1 tablet (500 mg) by mouth 2 times daily (with meals)       Biguanide Agents Failed - 11/29/2019  6:53 PM        Failed - Blood pressure less than 140/90 in past 6 months     BP Readings from Last 3 Encounters:   04/16/19 136/70   04/01/19 131/83   02/12/19 130/86                 Failed - Patient has documented LDL within the past 12 mos.     Recent Labs   Lab Test 09/08/17  1049   LDL 66             Failed - Patient has had a Microalbumin in the past 15 mos.     No lab results found.          Failed - Patient has documented A1c within the specified period of time.     If HgbA1C is 8 or greater, it needs to be on file within the past 3 months.  If less than 8, must be on file within the past 6 months.     Recent Labs   Lab Test 11/16/18  1348   A1C 5.7*             Failed - Patient's CR is NOT>1.4 OR Patient's EGFR is NOT<45 within past 12 mos.     Recent Labs   Lab Test 07/27/16  1101   GFRESTIMATED >90  Non  GFR Calc     GFRESTBLACK >90   GFR Calc         Recent Labs   Lab Test 07/27/16  1101   CR 0.62             Passed - Patient is age 10 or older        Passed - Patient does NOT have a diagnosis of CHF.        Passed - Medication is active on med list        Passed - Patient is not pregnant        Passed - Patient has not had a positive pregnancy test within the past 12 mos.         Passed - Recent (6 mo) or future (30 days) visit within the authorizing provider's specialty     Patient had office visit in the last 6 months or has a visit in the next 30 days with authorizing provider or within the authorizing provider's specialty.  See \"Patient Info\" tab in inbasket, or \"Choose Columns\" in Meds & Orders section of the refill encounter.            Routing refill request to provider for review/approval because:  Labs not current  Patient needs to be seen " because it has been more than 1 year since last office visit.- please call and schedule patient for annual exam

## 2019-12-03 ENCOUNTER — ALLIED HEALTH/NURSE VISIT (OUTPATIENT)
Dept: NURSING | Facility: CLINIC | Age: 24
End: 2019-12-03
Payer: COMMERCIAL

## 2019-12-03 VITALS — SYSTOLIC BLOOD PRESSURE: 138 MMHG | DIASTOLIC BLOOD PRESSURE: 62 MMHG

## 2019-12-03 DIAGNOSIS — Z78.9 USES BIRTH CONTROL: Primary | ICD-10-CM

## 2019-12-03 PROCEDURE — 96372 THER/PROPH/DIAG INJ SC/IM: CPT

## 2019-12-03 RX ADMIN — MEDROXYPROGESTERONE ACETATE 150 MG: 150 INJECTION, SUSPENSION INTRAMUSCULAR at 10:48

## 2019-12-03 NOTE — PROGRESS NOTES
Clinic Administered Medication Documentation    MEDICATION LIST:   Depo Provera Documentation    Prior to injection, verified patient identity using patient's name and date of birth. Medication was administered. Please see MAR and medication order for additional information. Patient instructed to remain in clinic for 15 minutes and report any adverse reaction to staff immediately .    BP: Data Unavailable    LAST PAP/EXAM: No results found for: PAP  URINE HCG:negative    NEXT INJECTION DUE: 2/18/20 - 3/3/20    Was entire vial of medication used? Yes  Vial/Syringe: Single dose vial  Expiration Date:  07/20    Rose Mary Carrion MA

## 2020-02-16 ENCOUNTER — HEALTH MAINTENANCE LETTER (OUTPATIENT)
Age: 25
End: 2020-02-16

## 2020-02-18 ENCOUNTER — ALLIED HEALTH/NURSE VISIT (OUTPATIENT)
Dept: NURSING | Facility: CLINIC | Age: 25
End: 2020-02-18
Payer: COMMERCIAL

## 2020-02-18 VITALS — DIASTOLIC BLOOD PRESSURE: 68 MMHG | SYSTOLIC BLOOD PRESSURE: 124 MMHG

## 2020-02-18 DIAGNOSIS — Z30.42 ENCOUNTER FOR SURVEILLANCE OF INJECTABLE CONTRACEPTIVE: Primary | ICD-10-CM

## 2020-02-18 PROCEDURE — 96372 THER/PROPH/DIAG INJ SC/IM: CPT

## 2020-02-18 RX ADMIN — MEDROXYPROGESTERONE ACETATE 150 MG: 150 INJECTION, SUSPENSION INTRAMUSCULAR at 15:12

## 2020-02-18 NOTE — PROGRESS NOTES
Clinic Administered Medication Documentation    MEDICATION LIST:   Depo Provera Documentation    Prior to injection, verified patient identity using patient's name and date of birth. Medication was administered. Please see MAR and medication order for additional information. Patient instructed to remain in clinic for 15 minutes and report any adverse reaction to staff immediately    BP: 124/68    LAST PAP/EXAM: No results found for: PAP  URINE HCG:negative    NEXT INJECTION DUE: 5/5/20 - 5/19/20    Was entire vial of medication used? Yes  Vial/Syringe: Single dose vial  Expiration Date:  Jan 2021    MEDICATION: Depo Provera 150mg  ROUTE: IM  SITE: Ventrogluteal - Right  DOSE: 150 mg  LOT #: [id number]  :  [employer]  EXPIRATION DATE:  Jan 2021  NDC#: 97586-476-15     Rose Mary Carrion MA

## 2020-04-17 ENCOUNTER — OFFICE VISIT (OUTPATIENT)
Dept: PEDIATRICS | Facility: CLINIC | Age: 25
End: 2020-04-17
Payer: COMMERCIAL

## 2020-04-17 VITALS
RESPIRATION RATE: 18 BRPM | DIASTOLIC BLOOD PRESSURE: 64 MMHG | OXYGEN SATURATION: 99 % | SYSTOLIC BLOOD PRESSURE: 130 MMHG | TEMPERATURE: 99.1 F | HEART RATE: 97 BPM

## 2020-04-17 DIAGNOSIS — J01.00 ACUTE NON-RECURRENT MAXILLARY SINUSITIS: Primary | ICD-10-CM

## 2020-04-17 PROCEDURE — 99213 OFFICE O/P EST LOW 20 MIN: CPT | Performed by: INTERNAL MEDICINE

## 2020-04-17 RX ORDER — LEVOFLOXACIN 500 MG/1
500 TABLET, FILM COATED ORAL DAILY
Qty: 10 TABLET | Refills: 0 | Status: SHIPPED | OUTPATIENT
Start: 2020-04-17 | End: 2021-02-26

## 2020-04-17 NOTE — PATIENT INSTRUCTIONS
Try taking Claritin (loratadine) 10 mg once daily    If your symptoms worsen, or if your symptoms are not improving over the next few days, fill the prescription for levofloxacin

## 2020-04-17 NOTE — PROGRESS NOTES
Subjective     Susy Huerta is a 24 year old female who presents to clinic today for the following health issues:    HPI   RESPIRATORY SYMPTOMS      Duration: 4 days     Description  nasal congestion, facial pain/pressure, wheezing, fever and headache    Severity: mild    Accompanying signs and symptoms: None    History (predisposing factors):  Exposed to sick parent, and sister with pneumonia     Precipitating or alleviating factors: None    Therapies tried and outcome:  rest and fluids oral decongestant acetaminophen    Does not typically have sinusitis or seasonal allergies.  Is home w/ her mother who has been battling sinus symptoms for the past 3 weeks.     HA is typically cheeks, forehead, behind the eyes and on the top of the head.     Reviewed and updated as needed this visit by Provider  Tobacco  Allergies  Meds  Problems  Med Hx  Surg Hx  Fam Hx             Objective    /64 (BP Location: Right arm, Patient Position: Sitting, Cuff Size: Adult Large)   Pulse 97   Temp 99.1  F (37.3  C) (Tympanic)   Resp 18   SpO2 99%   Physical Exam   GEN: no distress  HEENT: PERRL. EOMI. TM's clear bilaterally. Nasal mucosa erythematous and edematous. OP moist without lesions.  NECK: Supple  LUNGS: Clear to auscultation bilaterally. No rhonchi, rales, wheezes or retractions.  CV: Regular rate and rhythm.  No murmurs, rubs or gallops. Pulses 2+ radial.          Assessment & Plan       ICD-10-CM    1. Acute non-recurrent maxillary sinusitis  J01.00 levofloxacin (LEVAQUIN) 500 MG tablet       Sinusitis. Potentially viral but cannot r/o allergic or early bacterial    Try taking Claritin (loratadine) 10 mg once daily    If your symptoms worsen, or if your symptoms are not improving over the next few days, fill the prescription for levofloxacin       Yohan Beltran MD  [facility]

## 2020-07-23 DIAGNOSIS — F43.23 ADJUSTMENT DISORDER WITH MIXED ANXIETY AND DEPRESSED MOOD: ICD-10-CM

## 2020-07-23 RX ORDER — SERTRALINE HYDROCHLORIDE 100 MG/1
TABLET, FILM COATED ORAL
Qty: 180 TABLET | Refills: 0 | Status: SHIPPED | OUTPATIENT
Start: 2020-07-23 | End: 2020-11-05

## 2020-07-23 NOTE — TELEPHONE ENCOUNTER
Routing refill request to provider for review/approval because:  PHQ-9 not current and was abnormal.     Jasmin Pike, RN   [facility] -- Triage Nurse

## 2020-09-29 ENCOUNTER — ALLIED HEALTH/NURSE VISIT (OUTPATIENT)
Dept: NURSING | Facility: CLINIC | Age: 25
End: 2020-09-29
Payer: COMMERCIAL

## 2020-09-29 DIAGNOSIS — Z23 NEED FOR PROPHYLACTIC VACCINATION AND INOCULATION AGAINST INFLUENZA: Primary | ICD-10-CM

## 2020-09-29 PROCEDURE — 90686 IIV4 VACC NO PRSV 0.5 ML IM: CPT

## 2020-09-29 PROCEDURE — 90471 IMMUNIZATION ADMIN: CPT

## 2020-11-03 ENCOUNTER — OFFICE VISIT (OUTPATIENT)
Dept: OBGYN | Facility: CLINIC | Age: 25
End: 2020-11-03
Payer: COMMERCIAL

## 2020-11-03 VITALS — WEIGHT: 293 LBS | SYSTOLIC BLOOD PRESSURE: 128 MMHG | BODY MASS INDEX: 55.31 KG/M2 | DIASTOLIC BLOOD PRESSURE: 80 MMHG

## 2020-11-03 DIAGNOSIS — Z30.013 ENCOUNTER FOR INITIAL PRESCRIPTION OF INJECTABLE CONTRACEPTIVE: Primary | ICD-10-CM

## 2020-11-03 PROCEDURE — 96372 THER/PROPH/DIAG INJ SC/IM: CPT | Performed by: ADVANCED PRACTICE MIDWIFE

## 2020-11-03 PROCEDURE — 99213 OFFICE O/P EST LOW 20 MIN: CPT | Mod: 25 | Performed by: ADVANCED PRACTICE MIDWIFE

## 2020-11-03 RX ORDER — MEDROXYPROGESTERONE ACETATE 150 MG/ML
150 INJECTION, SUSPENSION INTRAMUSCULAR
Status: DISCONTINUED | OUTPATIENT
Start: 2020-11-03 | End: 2020-11-03 | Stop reason: CLARIF

## 2020-11-03 RX ORDER — MEDROXYPROGESTERONE ACETATE 150 MG/ML
150 INJECTION, SUSPENSION INTRAMUSCULAR
Status: COMPLETED | OUTPATIENT
Start: 2020-11-03 | End: 2021-10-12

## 2020-11-03 RX ADMIN — MEDROXYPROGESTERONE ACETATE 150 MG: 150 INJECTION, SUSPENSION INTRAMUSCULAR at 09:52

## 2020-11-03 NOTE — PATIENT INSTRUCTIONS
Patient Education     Patient Education    Medroxyprogesterone Acetate Oral tablet    Medroxyprogesterone Acetate Suspension for injection [Contraception]    Medroxyprogesterone Acetate Suspension for injection [Malignancy]  Medroxyprogesterone Acetate Suspension for injection [Contraception]  What is this medicine?  MEDROXYPROGESTERONE (me DROX ee proe NATACHA te ariela) contraceptive injections prevent pregnancy. They provide effective birth control for 3 months. Depo-subQ Provera 104 is also used for treating pain related to endometriosis.  This medicine may be used for other purposes; ask your health care provider or pharmacist if you have questions.  What should I tell my health care provider before I take this medicine?  They need to know if you have any of these conditions:    frequently drink alcohol    asthma    blood vessel disease or a history of a blood clot in the lungs or legs    bone disease such as osteoporosis    breast cancer    diabetes    eating disorder (anorexia nervosa or bulimia)    high blood pressure    HIV infection or AIDS    kidney disease    liver disease    mental depression    migraine    seizures (convulsions)    stroke    tobacco smoker    vaginal bleeding    an unusual or allergic reaction to medroxyprogesterone, other hormones, medicines, foods, dyes, or preservatives    pregnant or trying to get pregnant    breast-feeding  How should I use this medicine?  Depo-Provera Contraceptive injection is given into a muscle. Depo-subQ Provera 104 injection is given under the skin. These injections are given by a health care professional. You must not be pregnant before getting an injection. The injection is usually given during the first 5 days after the start of a menstrual period or 6 weeks after delivery of a baby.  Talk to your pediatrician regarding the use of this medicine in children. Special care may be needed. These injections have been used in female children who have started having  menstrual periods.  Overdosage: If you think you have taken too much of this medicine contact a poison control center or emergency room at once.  NOTE: This medicine is only for you. Do not share this medicine with others.  What if I miss a dose?  Try not to miss a dose. You must get an injection once every 3 months to maintain birth control. If you cannot keep an appointment, call and reschedule it. If you wait longer than 13 weeks between Depo-Provera contraceptive injections or longer than 14 weeks between Depo-subQ Provera 104 injections, you could get pregnant. Use another method for birth control if you miss your appointment. You may also need a pregnancy test before receiving another injection.  What may interact with this medicine?  Do not take this medicine with any of the following medications:    bosentan  This medicine may also interact with the following medications:    aminoglutethimide    antibiotics or medicines for infections, especially rifampin, rifabutin, rifapentine, and griseofulvin    aprepitant    barbiturate medicines such as phenobarbital or primidone    bexarotene    carbamazepine    medicines for seizures like ethotoin, felbamate, oxcarbazepine, phenytoin, topiramate    modafinil    Marielle's wort  This list may not describe all possible interactions. Give your health care provider a list of all the medicines, herbs, non-prescription drugs, or dietary supplements you use. Also tell them if you smoke, drink alcohol, or use illegal drugs. Some items may interact with your medicine.  What should I watch for while using this medicine?  This drug does not protect you against HIV infection (AIDS) or other sexually transmitted diseases.  Use of this product may cause you to lose calcium from your bones. Loss of calcium may cause weak bones (osteoporosis). Only use this product for more than 2 years if other forms of birth control are not right for you. The longer you use this product for birth  control the more likely you will be at risk for weak bones. Ask your health care professional how you can keep strong bones.  You may have a change in bleeding pattern or irregular periods. Many females stop having periods while taking this drug.  If you have received your injections on time, your chance of being pregnant is very low. If you think you may be pregnant, see your health care professional as soon as possible.  Tell your health care professional if you want to get pregnant within the next year. The effect of this medicine may last a long time after you get your last injection.  What side effects may I notice from receiving this medicine?  Side effects that you should report to your doctor or health care professional as soon as possible:    allergic reactions like skin rash, itching or hives, swelling of the face, lips, or tongue    breast tenderness or discharge    breathing problems    changes in vision    depression    feeling faint or lightheaded, falls    fever    pain in the abdomen, chest, groin, or leg    problems with balance, talking, walking    unusually weak or tired    yellowing of the eyes or skin  Side effects that usually do not require medical attention (report to your doctor or health care professional if they continue or are bothersome):    acne    fluid retention and swelling    headache    irregular periods, spotting, or absent periods    temporary pain, itching, or skin reaction at site where injected    weight gain  This list may not describe all possible side effects. Call your doctor for medical advice about side effects. You may report side effects to FDA at 4-263-FDA-7804.  Where should I keep my medicine?  This does not apply. The injection will be given to you by a health care professional.  NOTE:This sheet is a summary. It may not cover all possible information. If you have questions about this medicine, talk to your doctor, pharmacist, or health care provider. Copyright   2016 Gold Standard

## 2020-11-03 NOTE — NURSING NOTE
"Chief Complaint   Patient presents with     Contraception     discuss restarting depo       Initial /80 (BP Location: Right arm, Cuff Size: Adult Large)   Wt (!) 173.3 kg (382 lb)   BMI 55.31 kg/m   Estimated body mass index is 55.31 kg/m  as calculated from the following:    Height as of 19: 1.77 m (5' 9.69\").    Weight as of this encounter: 173.3 kg (382 lb).  BP completed using cuff size: large    Questioned patient about current smoking habits.  Pt. has never smoked.          The following  Due: pap    Dilcia Christian CMA         Clinic Administered Medication Documentation      Depo Provera Documentation    URINE HCG: not indicated    Depo-Provera Standing Order inclusion/exclusion criteria reviewed.   Patient meets: inclusion criteria     BP: 128/80  LAST PAP/EXAM: No results found for: PAP    Prior to injection, verified patient identity using patient's name and date of birth. Medication was administered. Please see MAR and medication order for additional information.     Was entire vial of medication used? Yes  Vial/Syringe: Single dose vial  Expiration Date:  2022    Patient instructed to remain in clinic for 15 minutes   .  NEXT INJECTION DUE: 21 - 21  Given RUG       Emely Graham CMA on 11/3/2020 at 9:54 AM  "

## 2020-11-03 NOTE — PROGRESS NOTES
SUBJECTIVE:   Susy Huerta is a 24 year old who presents to the clinic for discussion of birth control methods.   She has used the following methods in the past: Depo Provera, last injection was in February of this year, didn't return to clinic when due for repeat injection due to COVID-19 pandemic. Would like to re-start depo today. Doesn't normally get a period with this method which she is happy about. Has a hx of irregular periods and endometriosis, had a D&C at 19 years old for heavy bleeding and thickened endometrial lining, normal endometrial stripe on ultrasound done in 2018.   Today she is interested in discussing Depo Provera  Histories reviewed and updated  Past Medical History:   Diagnosis Date     Menorrhagia      Obese      Past Surgical History:   Procedure Laterality Date     DILATION AND CURETTAGE, HYSTEROSCOPY DIAGNOSTIC, COMBINED N/A 6/23/2015    Procedure: COMBINED DILATION AND CURETTAGE, HYSTEROSCOPY DIAGNOSTIC;  Surgeon: Soraida Rosado MD;  Location:  OR     Social History     Socioeconomic History     Marital status: Single     Spouse name: Not on file     Number of children: Not on file     Years of education: Not on file     Highest education level: Not on file   Occupational History     Not on file   Social Needs     Financial resource strain: Not on file     Food insecurity     Worry: Not on file     Inability: Not on file     Transportation needs     Medical: Not on file     Non-medical: Not on file   Tobacco Use     Smoking status: Never Smoker     Smokeless tobacco: Never Used   Substance and Sexual Activity     Alcohol use: No     Alcohol/week: 0.0 standard drinks     Drug use: No     Sexual activity: Never   Lifestyle     Physical activity     Days per week: Not on file     Minutes per session: Not on file     Stress: Not on file   Relationships     Social connections     Talks on phone: Not on file     Gets together: Not on file     Attends Mandaeism service: Not  on file     Active member of club or organization: Not on file     Attends meetings of clubs or organizations: Not on file     Relationship status: Not on file     Intimate partner violence     Fear of current or ex partner: Not on file     Emotionally abused: Not on file     Physically abused: Not on file     Forced sexual activity: Not on file   Other Topics Concern      Service Not Asked     Blood Transfusions Not Asked     Caffeine Concern Not Asked     Occupational Exposure Not Asked     Hobby Hazards Not Asked     Sleep Concern Not Asked     Stress Concern Not Asked     Weight Concern Not Asked     Special Diet Not Asked     Back Care Not Asked     Exercise Not Asked     Bike Helmet Not Asked     Seat Belt Not Asked     Self-Exams Not Asked     Parent/sibling w/ CABG, MI or angioplasty before 65F 55M? No   Social History Narrative    moved from [place], [place] 2003; lives with mom and 2 sisters    flys, with mom, back to [place] to visit father 1-3x/month.  Doesn't like flying.        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        Enjoy reading (adventure)        Stays active by walking on trails, doing errands.      Family History   Problem Relation Age of Onset     Respiratory Maternal Grandmother         emphasema     Hypertension Maternal Grandmother      Other Cancer Maternal Grandmother         lung cancer     Cancer - colorectal Maternal Grandfather         Diagnosed at age 60     Hypertension Maternal Grandfather      Hypertension Mother      Other - See Comments Father         Not involved in her life     Arthritis Sister         SHAYLA     Pulmonary Embolism Sister         On Virginie, Mom thinks [facility] Leiden neg     Hypertension Maternal Uncle      Breast Cancer No family hx of        Menstrual History:  Menses every irregular days.  Menstrual description: heavy, irregular and oligomenorrheic    Denies the following contraindications to estrogen/progesterone  combined contraception:  Migraine with aura  Smoking over age 35  Liver disease  Personal history of blood clot or stroke   History of heart disease  History of breast cancer  Undiagnosed vaginal bleeding  Hypertension  Pregnancy, not sexually active     Health maintenance updated:  no    ROS:   CONSTITUTIONAL: NEGATIVE for fever, chills, change in weight  INTEGUMENTARU/SKIN: NEGATIVE for worrisome rashes, moles or lesions  BREAST: NEGATIVE for masses, tenderness or discharge  CV: NEGATIVE for chest pain, palpitations or peripheral edema  GI: NEGATIVE for nausea, abdominal pain, heartburn, or change in bowel habits  : NEGATIVE for unusual urinary or vaginal symptoms. Periods are regular.  MUSCULOSKELETAL: NEGATIVE for significant arthralgias or myalgia  NEURO: NEGATIVE for weakness, dizziness or paresthesias  PSYCHIATRIC: NEGATIVE for changes in mood or affect    EXAM:  /80 (BP Location: Right arm, Cuff Size: Adult Large)   Wt (!) 173.3 kg (382 lb)   BMI 55.31 kg/m    Body mass index is 55.31 kg/m .  Exam:  Constitutional: healthy, alert and no distress  Respiratory: negative, Percussion normal. Good diaphragmatic excursion.   Psychiatric: mentation appears normal and affect normal/bright  No further exam needed as this is a counseling appointment.    ASSESSMENT/PLAN:    ICD-10-CM    1. Contraceptive management  Z30.9 HCL HCG, URINE, NURSE BACKOFFICE     There are no contraindications to the use of Depo Provera    COUNSELING:  Reviewed risks and benefits of contraceptive use  (insert ocp counseling)  Discussed proper use of chosen method, reviewed adequate calcium intake with Depo   Handouts/Instructions provided   Pt due for PAP and physical today, doesn't want to do them today, prefers to schedule a follow-up appointment.       10 minutes was spent face to face with the patient today discussing her history, diagnosis, and follow-up plan as noted above. Over 50% of the visit was spent in counseling and  coordination of care.      ANATOLIY Haley CNM 11/3/2020 9:20 AM

## 2020-11-05 DIAGNOSIS — F43.23 ADJUSTMENT DISORDER WITH MIXED ANXIETY AND DEPRESSED MOOD: ICD-10-CM

## 2020-11-05 RX ORDER — SERTRALINE HYDROCHLORIDE 100 MG/1
200 TABLET, FILM COATED ORAL DAILY
Qty: 180 TABLET | Refills: 0 | Status: SHIPPED | OUTPATIENT
Start: 2020-11-05 | End: 2021-02-26

## 2020-11-05 NOTE — TELEPHONE ENCOUNTER
Routing refill request to provider for review/approval because:  Labs out of range:  PHQ9  Labs not current:  PHQ9  Patient needs to be seen because it has been more than 1 year since last office visit.      Routing to team to assist with making annual visit.     Soraida Lackey RN Flex

## 2020-12-04 ENCOUNTER — TELEPHONE (OUTPATIENT)
Dept: PEDIATRICS | Facility: CLINIC | Age: 25
End: 2020-12-04

## 2020-12-04 NOTE — TELEPHONE ENCOUNTER
Called patient back for more information. She is currently at work, her mother took a message  Discussed that I would like to gather some more information from Susy when she is available.  Planned for Susy to call back on Monday 12/7 at 800am to discuss further  Reviewed heavy bleeding/ pain precautions and when to seek emergency treatment  ANATOLIY Haley CNM 12/4/2020 4:59 PM

## 2020-12-04 NOTE — TELEPHONE ENCOUNTER
"  Symptoms    Describe your symptoms: Had a depo shot and ever since has had a \"really bad\" period. Patient stated it's not a heavy period but she is experiencing painful cramps and a lot of little clots.    Any pain: Yes: cramps    How long have you been having symptoms: 2  weeks    Have you been seen for this:  No    Appointment offered?: No    Triage offered?: Yes: encounter    Home remedies tried: n/a    Requested Pharmacy: n/a    Okay to leave a detailed message? Yes at Home number on file [phone] (home)    [place],             "

## 2020-12-04 NOTE — TELEPHONE ENCOUNTER
Routing to OB pool.  Medication was ordered and discussed at appt with a nurse midwife in OB    Moe Alarcon RN on 12/4/2020 at 3:56 PM

## 2020-12-07 ENCOUNTER — TELEPHONE (OUTPATIENT)
Dept: OBGYN | Facility: CLINIC | Age: 25
End: 2020-12-07

## 2020-12-07 NOTE — TELEPHONE ENCOUNTER
Pt calling with concerns of bleeding x 2 weeks. She restarted depo provera on 11/3/20. Currently the bleeding is less than a period. Also states she is passing blood clots.     Pt also c/o menstrual cramps.    I did advise that irregular bleeding can be normal when starting the depo. Pt is concerned as she had to have a D&C previously due to heavy bleeding.    Please advise.     Ynes DUPREE RN

## 2020-12-07 NOTE — TELEPHONE ENCOUNTER
This sounds pretty normal after starting depo. Given that the bleeding has lessened and is < a period, let's have her continue to monitor for now. She can certainly use ibuprofen for the cramping. Given her history of endometrial hyperplasia requiring D&C, if the bleeding continues over the next few weeks, or becomes heavy, we can order a pelvic ultrasound.     Please let her know    Thanks!  Heather Haley CNM

## 2021-02-12 ENCOUNTER — ALLIED HEALTH/NURSE VISIT (OUTPATIENT)
Dept: PEDIATRICS | Facility: CLINIC | Age: 26
End: 2021-02-12
Payer: COMMERCIAL

## 2021-02-12 VITALS
WEIGHT: 293 LBS | BODY MASS INDEX: 53.6 KG/M2 | DIASTOLIC BLOOD PRESSURE: 90 MMHG | HEART RATE: 92 BPM | SYSTOLIC BLOOD PRESSURE: 140 MMHG

## 2021-02-12 DIAGNOSIS — Z78.9 USES BIRTH CONTROL: Primary | ICD-10-CM

## 2021-02-12 LAB — HCG UR QL: NEGATIVE

## 2021-02-12 PROCEDURE — 96372 THER/PROPH/DIAG INJ SC/IM: CPT

## 2021-02-12 PROCEDURE — 99207 PR NO CHARGE NURSE ONLY: CPT

## 2021-02-12 PROCEDURE — 81025 URINE PREGNANCY TEST: CPT | Performed by: INTERNAL MEDICINE

## 2021-02-12 RX ADMIN — MEDROXYPROGESTERONE ACETATE 150 MG: 150 INJECTION, SUSPENSION INTRAMUSCULAR at 16:18

## 2021-02-25 ENCOUNTER — TELEPHONE (OUTPATIENT)
Dept: PEDIATRICS | Facility: CLINIC | Age: 26
End: 2021-02-25

## 2021-02-25 DIAGNOSIS — R73.03 PRE-DIABETES: ICD-10-CM

## 2021-02-25 DIAGNOSIS — F43.23 ADJUSTMENT DISORDER WITH MIXED ANXIETY AND DEPRESSED MOOD: ICD-10-CM

## 2021-02-25 NOTE — TELEPHONE ENCOUNTER
Routing refill request to provider for review/approval because:  Carmelina given x1 and patient did not follow up, please advise  Labs out of range: A1c amd PHQ9  Labs not current:  Last A1C 2018, Phq 9   Patient needs to be seen because it has been more than 1 year since last office visit.      Routing to team to assist with scheduling annual visit.     Soraida Lackey RN Flex

## 2021-02-25 NOTE — LETTER
March 19, 2021      [place]y Huerta  [address] [place] [place] [address]        Dear Susy,       We care about your health and have reviewed your health plan including your medical conditions, medications, and lab results.  Based on this review, it is recommended that you follow up regarding the following health topic(s):  -Depression  -Diabetes  -High Blood Pressure  -Wellness (Physical) Visit     We recommend you take the following action(s):  -schedule a FOLLOWUP APPOINTMENT.  -schedule a WELLNESS (Physical) APPOINTMENT.  We will perform the following labs: A1c, Lipids (fasting cholesterol - nothing to eat except water and/or meds for 8-10 hours), CMP(complete metabolic panel) and CBC (complete blood cell counts).     Please call us at the [facility] - [phone] (or use i[employer]) to address the above recommendations.     Thank you for trusting [facility] and we appreciate the opportunity to serve you.  We look forward to supporting your healthcare needs in the future.    Healthy Regards,    Your Health Care Team  [facility]

## 2021-02-26 RX ORDER — SERTRALINE HYDROCHLORIDE 100 MG/1
TABLET, FILM COATED ORAL
Qty: 180 TABLET | Refills: 0 | Status: SHIPPED | OUTPATIENT
Start: 2021-02-26 | End: 2021-04-25

## 2021-03-18 NOTE — TELEPHONE ENCOUNTER
CHG call to assist with scheduling. Patient did not answer and CHG got message saying voicemail box is not set up. Unable to leave voicemail.    Attempt #1    Josias Barron at 9:37 AM on 3/18/2021  [facility] Health Guide  Phone [phone]

## 2021-03-19 NOTE — TELEPHONE ENCOUNTER
CHG call to assist with scheduling. Patient did not answer and CHG got message saying voicemail box is not set up. Unable to leave voicemail.     Attempt #3.    CHG sending letter informing patient they are due for yearly visit.    Josias Barron at 11:48 AM on 3/19/2021  [facility] Health Guide  Phone [phone]

## 2021-04-10 ENCOUNTER — HEALTH MAINTENANCE LETTER (OUTPATIENT)
Age: 26
End: 2021-04-10

## 2021-04-19 DIAGNOSIS — F43.23 ADJUSTMENT DISORDER WITH MIXED ANXIETY AND DEPRESSED MOOD: ICD-10-CM

## 2021-04-19 DIAGNOSIS — R73.03 PRE-DIABETES: ICD-10-CM

## 2021-04-21 RX ORDER — SERTRALINE HYDROCHLORIDE 100 MG/1
TABLET, FILM COATED ORAL
Qty: 180 TABLET | Refills: 0 | OUTPATIENT
Start: 2021-04-21

## 2021-04-25 DIAGNOSIS — R73.03 PRE-DIABETES: ICD-10-CM

## 2021-04-25 DIAGNOSIS — F43.23 ADJUSTMENT DISORDER WITH MIXED ANXIETY AND DEPRESSED MOOD: ICD-10-CM

## 2021-04-26 RX ORDER — SERTRALINE HYDROCHLORIDE 100 MG/1
TABLET, FILM COATED ORAL
Qty: 180 TABLET | Refills: 0 | Status: SHIPPED | OUTPATIENT
Start: 2021-04-26 | End: 2021-05-03

## 2021-04-26 NOTE — TELEPHONE ENCOUNTER
Called patient and scheduled her for a phone visit with Monica Navarrete for 5/3.   Torres Estimable   MRN: [id number]    Department:  THE [facility] Emergency Department in [place]   Date of Visit:  8/14/2018           Disclosure     Insurance plans vary and the physician(s) referred by the ER may not be covered by your plan.  Please conta tell this physician (or your personal doctor if your instructions are to return to your personal doctor) about any new or lasting problems. The primary care or specialist physician will see patients referred from the [facility] Emergency Department.  Puma Murray

## 2021-04-26 NOTE — TELEPHONE ENCOUNTER
Medication is being filled for 1 time refill only due to:  Patient needs to be seen because it has been more than one year since last visit.    Please call patient and assist with scheduling prior to additional refills.    Sabrina MIMS - Registered Nurse  [facility]  Acute and Diagnostic Services

## 2021-05-03 ENCOUNTER — VIRTUAL VISIT (OUTPATIENT)
Dept: PEDIATRICS | Facility: CLINIC | Age: 26
End: 2021-05-03
Payer: COMMERCIAL

## 2021-05-03 ENCOUNTER — ALLIED HEALTH/NURSE VISIT (OUTPATIENT)
Dept: PEDIATRICS | Facility: CLINIC | Age: 26
End: 2021-05-03
Payer: COMMERCIAL

## 2021-05-03 VITALS — HEART RATE: 97 BPM | DIASTOLIC BLOOD PRESSURE: 82 MMHG | SYSTOLIC BLOOD PRESSURE: 144 MMHG

## 2021-05-03 DIAGNOSIS — R73.03 PRE-DIABETES: ICD-10-CM

## 2021-05-03 DIAGNOSIS — Z30.42 ENCOUNTER FOR SURVEILLANCE OF INJECTABLE CONTRACEPTIVE: Primary | ICD-10-CM

## 2021-05-03 DIAGNOSIS — F43.23 ADJUSTMENT DISORDER WITH MIXED ANXIETY AND DEPRESSED MOOD: ICD-10-CM

## 2021-05-03 PROCEDURE — 96127 BRIEF EMOTIONAL/BEHAV ASSMT: CPT | Performed by: NURSE PRACTITIONER

## 2021-05-03 PROCEDURE — 99207 PR NO CHARGE NURSE ONLY: CPT

## 2021-05-03 PROCEDURE — 96372 THER/PROPH/DIAG INJ SC/IM: CPT

## 2021-05-03 PROCEDURE — 99214 OFFICE O/P EST MOD 30 MIN: CPT | Mod: TEL | Performed by: NURSE PRACTITIONER

## 2021-05-03 RX ORDER — SERTRALINE HYDROCHLORIDE 100 MG/1
TABLET, FILM COATED ORAL
Qty: 180 TABLET | Refills: 3 | Status: SHIPPED | OUTPATIENT
Start: 2021-05-03 | End: 2022-05-26

## 2021-05-03 RX ADMIN — MEDROXYPROGESTERONE ACETATE 150 MG: 150 INJECTION, SUSPENSION INTRAMUSCULAR at 15:45

## 2021-05-03 ASSESSMENT — PATIENT HEALTH QUESTIONNAIRE - PHQ9
5. POOR APPETITE OR OVEREATING: NOT AT ALL
SUM OF ALL RESPONSES TO PHQ QUESTIONS 1-9: 2

## 2021-05-03 ASSESSMENT — ANXIETY QUESTIONNAIRES
1. FEELING NERVOUS, ANXIOUS, OR ON EDGE: SEVERAL DAYS
IF YOU CHECKED OFF ANY PROBLEMS ON THIS QUESTIONNAIRE, HOW DIFFICULT HAVE THESE PROBLEMS MADE IT FOR YOU TO DO YOUR WORK, TAKE CARE OF THINGS AT HOME, OR GET ALONG WITH OTHER PEOPLE: NOT DIFFICULT AT ALL
2. NOT BEING ABLE TO STOP OR CONTROL WORRYING: NOT AT ALL
GAD7 TOTAL SCORE: 1
6. BECOMING EASILY ANNOYED OR IRRITABLE: NOT AT ALL
7. FEELING AFRAID AS IF SOMETHING AWFUL MIGHT HAPPEN: NOT AT ALL
3. WORRYING TOO MUCH ABOUT DIFFERENT THINGS: NOT AT ALL
5. BEING SO RESTLESS THAT IT IS HARD TO SIT STILL: NOT AT ALL

## 2021-05-03 NOTE — PROGRESS NOTES
Susy is a 25 year old who is being evaluated via a billable telephone visit.      What phone number would you like to be contacted at? [phone]  How would you like to obtain your AVS? MyChart    Assessment & Plan     (Z68.43) BMI 50.0-59.9, adult (H)  (primary encounter diagnosis)  Comment: Discussed that her weight likely contributes to her high blood sugar.   Plan: COMPREHENSIVE WEIGHT MANAGEMENT  -Agrees to see weight clinic  -Continue metformin for blood sugar  -In the future, consider switching to Prozac-more weight neutral  -Consider switching to non depo birth control as this can cause weight gain  -At physical, consider TSH and morning cortisol    (F43.23) Adjustment disorder with mixed anxiety and depressed mood  Comment: Stable  Plan: sertraline (ZOLOFT) 100 MG tablet      (R73.03) Pre-diabetes  Plan: metFORMIN (GLUCOPHAGE) 500 MG tablet,         COMPREHENSIVE WEIGHT MANAGEMENT  Referral to weight clinic  -Physical scheduled with Dr. Beltran. Asked her to come fasting.    Monica Navarrete, EDDI CNP  [facility]   Susy is a 25 year old who presents for the following health issues     HPI     Depression and Anxiety Follow-Up    How are you doing with your depression since your last visit? No change    How are you doing with your anxiety since your last visit?  No change    Are you having other symptoms that might be associated with depression or anxiety? No    Have you had a significant life event? No     Do you have any concerns with your use of alcohol or other drugs? No    Social History     Tobacco Use     Smoking status: Never Smoker     Smokeless tobacco: Never Used   Substance Use Topics     Alcohol use: No     Alcohol/week: 0.0 standard drinks     Drug use: No     PHQ 4/21/2018 5/15/2019 5/3/2021   PHQ-9 Total Score 8 7 2   Q9: Thoughts of better off dead/self-harm past 2 weeks Not at all Not at all Not at all     RAMIRO-7 SCORE 4/21/2018 5/15/2019 5/3/2021    Total Score - - -   Total Score 5 2 1     Last PHQ-9 5/3/2021   1.  Little interest or pleasure in doing things 0   2.  Feeling down, depressed, or hopeless 0   3.  Trouble falling or staying asleep, or sleeping too much 1   4.  Feeling tired or having little energy 1   5.  Poor appetite or overeating 0   6.  Feeling bad about yourself 0   7.  Trouble concentrating 0   8.  Moving slowly or restless 0   Q9: Thoughts of better off dead/self-harm past 2 weeks 0   PHQ-9 Total Score 2   Difficulty at work, home, or with people Not difficult at all     RAMIRO-7  5/3/2021   1. Feeling nervous, anxious, or on edge 1   2. Not being able to stop or control worrying 0   3. Worrying too much about different things 0   4. Trouble relaxing 0   5. Being so restless that it is hard to sit still 0   6. Becoming easily annoyed or irritable 0   7. Feeling afraid, as if something awful might happen 0   RAMIRO-7 Total Score 1   If you checked any problems, how difficult have they made it for you to do your work, take care of things at home, or get along with other people? Not difficult at all       Suicide Assessment Five-step Evaluation and Treatment (SAFE-T)      How many servings of fruits and vegetables do you eat daily?  2-3    On average, how many sweetened beverages do you drink each day (Examples: soda, juice, sweet tea, etc.  Do NOT count diet or artificially sweetened beverages)?   0-1    How many days per week do you exercise enough to make your heart beat faster? 3 or less    How many minutes a day do you exercise enough to make your heart beat faster? 60 or more    How many days per week do you miss taking your medication? 0    Review of Systems   Constitutional, HEENT, cardiovascular, pulmonary, GI, , musculoskeletal, neuro, skin, endocrine and psych systems are negative, except as otherwise noted.          Phone call duration: 10 minutes

## 2021-05-03 NOTE — NURSING NOTE
Verbal per Dr. Anand to administer Depo Provera with BP of 144/82 pulse 97, patient has physical scheduled 5/28/21, will take BP a few times per week and report to Dr. Beltran at her physical.

## 2021-05-03 NOTE — NURSING NOTE
Clinic Administered Medication Documentation      Depo Provera Documentation    URINE HCG: not indicated    Depo-Provera Standing Order inclusion/exclusion criteria reviewed.   Patient meets: inclusion criteria     BP: Data Unavailable  LAST PAP/EXAM: No results found for: PAP    Prior to injection, verified patient identity using patient's name and date of birth. Medication was administered. Please see MAR and medication order for additional information.     Was entire vial of medication used? Yes  Vial/Syringe: Single dose vial  Expiration Date:      Patient instructed to stay in clinic after the injection but patient declined.  NEXT INJECTION DUE: 7/19/21 - 8/2/21

## 2021-05-04 ASSESSMENT — ANXIETY QUESTIONNAIRES: GAD7 TOTAL SCORE: 1

## 2021-05-28 ENCOUNTER — OFFICE VISIT (OUTPATIENT)
Dept: PEDIATRICS | Facility: CLINIC | Age: 26
End: 2021-05-28
Payer: COMMERCIAL

## 2021-05-28 VITALS
DIASTOLIC BLOOD PRESSURE: 78 MMHG | RESPIRATION RATE: 16 BRPM | HEIGHT: 69 IN | HEART RATE: 92 BPM | TEMPERATURE: 99.1 F | BODY MASS INDEX: 43.4 KG/M2 | WEIGHT: 293 LBS | OXYGEN SATURATION: 98 % | SYSTOLIC BLOOD PRESSURE: 128 MMHG

## 2021-05-28 DIAGNOSIS — E66.813 CLASS 3 SEVERE OBESITY WITHOUT SERIOUS COMORBIDITY WITH BODY MASS INDEX (BMI) OF 50.0 TO 59.9 IN ADULT, UNSPECIFIED OBESITY TYPE (H): ICD-10-CM

## 2021-05-28 DIAGNOSIS — E66.01 CLASS 3 SEVERE OBESITY WITHOUT SERIOUS COMORBIDITY WITH BODY MASS INDEX (BMI) OF 50.0 TO 59.9 IN ADULT, UNSPECIFIED OBESITY TYPE (H): ICD-10-CM

## 2021-05-28 DIAGNOSIS — Z00.00 ROUTINE GENERAL MEDICAL EXAMINATION AT A HEALTH CARE FACILITY: Primary | ICD-10-CM

## 2021-05-28 DIAGNOSIS — R73.03 PRE-DIABETES: ICD-10-CM

## 2021-05-28 LAB
CHOLEST SERPL-MCNC: 151 MG/DL
HBA1C MFR BLD: 5.6 % (ref 0–5.6)
HDLC SERPL-MCNC: 46 MG/DL
LDLC SERPL CALC-MCNC: 84 MG/DL
NONHDLC SERPL-MCNC: 105 MG/DL
TRIGL SERPL-MCNC: 104 MG/DL

## 2021-05-28 PROCEDURE — 80061 LIPID PANEL: CPT | Performed by: INTERNAL MEDICINE

## 2021-05-28 PROCEDURE — 83036 HEMOGLOBIN GLYCOSYLATED A1C: CPT | Performed by: INTERNAL MEDICINE

## 2021-05-28 PROCEDURE — 36415 COLL VENOUS BLD VENIPUNCTURE: CPT | Performed by: INTERNAL MEDICINE

## 2021-05-28 PROCEDURE — 99395 PREV VISIT EST AGE 18-39: CPT | Performed by: INTERNAL MEDICINE

## 2021-05-28 ASSESSMENT — ENCOUNTER SYMPTOMS
JOINT SWELLING: 0
PARESTHESIAS: 0
HEARTBURN: 0
DIARRHEA: 0
HEMATURIA: 0
ARTHRALGIAS: 0
FREQUENCY: 0
ABDOMINAL PAIN: 0
HEMATOCHEZIA: 0
NAUSEA: 0
COUGH: 0
DIZZINESS: 0
CONSTIPATION: 0
NERVOUS/ANXIOUS: 0
WEAKNESS: 0
FEVER: 0
CHILLS: 0
EYE PAIN: 0
DYSURIA: 0
SORE THROAT: 0
SHORTNESS OF BREATH: 0
PALPITATIONS: 0
MYALGIAS: 0
HEADACHES: 0

## 2021-05-28 ASSESSMENT — MIFFLIN-ST. JEOR: SCORE: 2515.7

## 2021-05-28 NOTE — PROGRESS NOTES
SUBJECTIVE:   CC: Susy Huerta is an 25 year old woman who presents for preventive health visit.     Patient has been advised of split billing requirements and indicates understanding: Yes  Healthy Habits:     Getting at least 3 servings of Calcium per day:  Yes    Bi-annual eye exam:  Yes    Dental care twice a year:  Yes    Sleep apnea or symptoms of sleep apnea:  None    Diet:  Regular (no restrictions)    Frequency of exercise:  None    Taking medications regularly:  Yes    Medication side effects:  None    PHQ-2 Total Score: 0    Additional concerns today:  No    Depression / anxiety. Feels is well controlled.   Had a recent visit to discuss, does not need med refills at this time for sertraline.  Today's PHQ-2 Score:   PHQ-2 ( 1999 Pfizer) 5/28/2021   Q1: Little interest or pleasure in doing things 0   Q2: Feeling down, depressed or hopeless 0   PHQ-2 Score 0   Q1: Little interest or pleasure in doing things Not at all   Q2: Feeling down, depressed or hopeless Not at all   PHQ-2 Score 0     Impaired fasting glucose. Taking metformin to prevent DM and to help with weight.  Wt Readings from Last 4 Encounters:   05/28/21 (!) 168.9 kg (372 lb 6.4 oz)   02/12/21 (!) 167.9 kg (370 lb 3.2 oz)   11/03/20 (!) 173.3 kg (382 lb)   04/16/19 (!) 165.1 kg (364 lb)     Reviewed obesity, need to lose weight.    Abuse: Current or Past (Physical, Sexual or Emotional) - No  Do you feel safe in your environment? Yes    Have you ever done Advance Care Planning? (For example, a Health Directive, POLST, or a discussion with a medical provider or your loved ones about your wishes): No, advance care planning information given to patient to review.  Patient declined advance care planning discussion at this time.    Social History     Tobacco Use     Smoking status: Never Smoker     Smokeless tobacco: Never Used   Substance Use Topics     Alcohol use: No     Alcohol/week: 0.0 standard drinks     If you drink alcohol do you  "typically have >3 drinks per day or >7 drinks per week? No    Alcohol Use 5/28/2021   Prescreen: >3 drinks/day or >7 drinks/week? Not Applicable   Prescreen: >3 drinks/day or >7 drinks/week? -       Reviewed orders with patient.  Reviewed health maintenance and updated orders accordingly - Yes        Review of Systems   Constitutional: Negative for chills and fever.   HENT: Negative for congestion, ear pain, hearing loss and sore throat.    Eyes: Negative for pain and visual disturbance.   Respiratory: Negative for cough and shortness of breath.    Cardiovascular: Negative for chest pain, palpitations and peripheral edema.   Gastrointestinal: Negative for abdominal pain, constipation, diarrhea, heartburn, hematochezia and nausea.   Genitourinary: Negative for dysuria, frequency, genital sores, hematuria and urgency.   Musculoskeletal: Negative for arthralgias, joint swelling and myalgias.   Skin: Negative for rash.   Neurological: Negative for dizziness, weakness, headaches and paresthesias.   Psychiatric/Behavioral: Negative for mood changes. The patient is not nervous/anxious.         OBJECTIVE:   /78 (BP Location: Right arm, Patient Position: Sitting, Cuff Size: Adult Regular)   Pulse 92   Temp 99.1  F (37.3  C) (Tympanic)   Resp 16   Ht 1.764 m (5' 9.45\")   Wt (!) 168.9 kg (372 lb 6.4 oz)   SpO2 98%   BMI 54.29 kg/m    Physical Exam   GEN: No distress  HEENT: PERRL. EOMI. TM's clear david.  NECK: Supple. No LAD or TM.  LUNGS: Clear to auscultation bilaterally. No rhonchi, rales, wheezes or retractions.  CV: Regular rate and rhythm.  No murmurs, rubs or gallops. Pulses 2+ radial.  ABD: normal bowel sounds. Soft, nontender.  EXTR: no edema  PSYCH: Normal affect. Well groomed. Good eye contact.  NEURO: no focal deficits.         ASSESSMENT/PLAN:       ICD-10-CM    1. Routine general medical examination at a health care facility  Z00.00 HEMOGLOBIN A1C     Lipid panel reflex to direct LDL Fasting   2. " "Pre-diabetes  R73.03 HEMOGLOBIN A1C   3. Class 3 severe obesity without serious comorbidity with body mass index (BMI) of 50.0 to 59.9 in adult, unspecified obesity type (H)  E66.01     Z68.43      COUNSELING:  Reviewed preventive health counseling, as reflected in patient instructions    Estimated body mass index is 54.29 kg/m  as calculated from the following:    Height as of this encounter: 1.764 m (5' 9.45\").    Weight as of this encounter: 168.9 kg (372 lb 6.4 oz).    Weight management plan: Discussed healthy diet and exercise guidelines    She reports that she has never smoked. She has never used smokeless tobacco.      Yohan Beltran MD  [facility] [place]  "

## 2021-06-18 NOTE — Clinical Note
I will be seeing Susy for individual therapy.  Syeda Andres, [facility]
No

## 2021-07-19 ENCOUNTER — ALLIED HEALTH/NURSE VISIT (OUTPATIENT)
Dept: PEDIATRICS | Facility: CLINIC | Age: 26
End: 2021-07-19
Payer: COMMERCIAL

## 2021-07-19 VITALS — DIASTOLIC BLOOD PRESSURE: 76 MMHG | SYSTOLIC BLOOD PRESSURE: 128 MMHG

## 2021-07-19 DIAGNOSIS — Z30.9 CONTRACEPTIVE MANAGEMENT: Primary | ICD-10-CM

## 2021-07-19 PROCEDURE — 99207 PR NO CHARGE NURSE ONLY: CPT

## 2021-07-19 NOTE — PROGRESS NOTES
BP: 128/76    LAST PAP/EXAM: No results found for: PAP  URINE HCG:not indicated    The following medication was given:     MEDICATION: Depo Provera 150mg  ROUTE: IM  SITE: Ventrogluteal - Left  : Mylan  LOT #: [id number]  EXP:12/2022  NEXT INJECTION DUE: 10/4/21 - 10/18/21   Provider: Dr Beltran

## 2021-08-13 ENCOUNTER — OFFICE VISIT (OUTPATIENT)
Dept: URGENT CARE | Facility: URGENT CARE | Age: 26
End: 2021-08-13
Payer: COMMERCIAL

## 2021-08-13 VITALS
OXYGEN SATURATION: 100 % | SYSTOLIC BLOOD PRESSURE: 162 MMHG | HEART RATE: 94 BPM | TEMPERATURE: 98.2 F | DIASTOLIC BLOOD PRESSURE: 92 MMHG

## 2021-08-13 DIAGNOSIS — R21 RASH AND NONSPECIFIC SKIN ERUPTION: Primary | ICD-10-CM

## 2021-08-13 PROCEDURE — 99213 OFFICE O/P EST LOW 20 MIN: CPT | Performed by: FAMILY MEDICINE

## 2021-08-13 RX ORDER — PREDNISONE 20 MG/1
40 TABLET ORAL DAILY
Qty: 14 TABLET | Refills: 0 | Status: SHIPPED | OUTPATIENT
Start: 2021-08-13 | End: 2021-08-20

## 2021-08-13 NOTE — LETTER
August 13, 2021      Susy Huerta  [address]  [place] [address]        To Whom It May Concern:    Susy Huerta was seen in our clinic today for  an appointment   Sincerely,        Ruperto Villatoro MD

## 2021-08-13 NOTE — PROGRESS NOTES
Assessment & Plan     Rash and nonspecific skin eruption  Differentials discussed in detail including allergic etiology.  Physical examination as described.  Prednisone prescribed, common side effects discussed.  Suggested well hydration, over-the-counter antihistamine and avoiding heat.  Follow-up if symptoms persist.  All questions answered.  - predniSONE (DELTASONE) 20 MG tablet; Take 2 tablets (40 mg) by mouth daily for 7 days      Ruperto Villatoro MD  [facility] [place] Jain is a 25 year old who presents for the following health issues     HPI     Rash  Onset/Duration: 3 days   Description  Location: lower legs   Character: red  Itching: moderate  Intensity:  moderate  Progression of Symptoms:  worsening  Accompanying signs and symptoms:   Fever: no  Body aches or joint pain: no  Sore throat symptoms: no  Recent cold symptoms: no  History:           Previous episodes of similar rash: eczema   New exposures:  None  Recent travel: no  Exposure to similar rash: no  Therapies tried and outcome: benadryl itch spray   No history of bug bite      Review of Systems   Constitutional, HEENT, cardiovascular, pulmonary, gi and gu systems are negative, except as otherwise noted.      Objective    BP (!) 162/92   Pulse 94   Temp 98.2  F (36.8  C) (Tympanic)   SpO2 100%   There is no height or weight on file to calculate BMI.  Physical Exam   GENERAL: alert and no distress  NECK: no adenopathy, no asymmetry, masses, or scars and thyroid normal to palpation  HEENT: normal exam   RESP: lungs clear to auscultation - no rales, rhonchi or wheezes  CV: regular rate and rhythm, normal S1 S2, no S3 or S4, no murmur, click or rub, no peripheral edema and peripheral pulses strong  MS: no gross musculoskeletal defects noted, no edema  SKIN: light erythematous papular rashes involving lower legs as shown below, no tenderness, vesicles or discharge noted

## 2021-08-16 ENCOUNTER — TELEPHONE (OUTPATIENT)
Dept: PEDIATRICS | Facility: CLINIC | Age: 26
End: 2021-08-16

## 2021-08-16 NOTE — TELEPHONE ENCOUNTER
"The patient called in reporting that she is not tolerating her prednisone.  She reports having severe hot flashes to the point of needing to apply an ice pack to her body.   Denies having a fever, Describes it as \"feeling like hot fudge\" running through her veins.     Advise follow-up visit to discuss with provider.  May need a change in course of treatment.  Advised virtual visit would be okay as rash has not worsened.     Transferred to central scheduling to look at virtual options today.    Jasmin Pike RN   [facility] -- Triage Nurse          "

## 2021-08-29 ENCOUNTER — E-VISIT (OUTPATIENT)
Dept: PEDIATRICS | Facility: CLINIC | Age: 26
End: 2021-08-29
Payer: COMMERCIAL

## 2021-08-29 DIAGNOSIS — H92.09 OTALGIA, UNSPECIFIED LATERALITY: Primary | ICD-10-CM

## 2021-08-29 PROCEDURE — 99207 PR NON-BILLABLE SERV PER CHARTING: CPT | Performed by: INTERNAL MEDICINE

## 2021-08-30 NOTE — PATIENT INSTRUCTIONS
Thank you for choosing us for your care. I think an in-clinic visit would be best next steps based on your symptoms. Please schedule a clinic appointment; you won t be charged for this eVisit.      You can schedule an appointment right here in [facility], or call [phone]

## 2021-08-30 NOTE — TELEPHONE ENCOUNTER
Would recommend an exam - need to determine if otitis media or otitis externa. They are treated differently.     MADHU Rodriguez MD  Internal Medicine-Pediatrics

## 2021-08-30 NOTE — TELEPHONE ENCOUNTER
Called patient - no answer. Lvm to call the clinic to set up an appointment.     Rose Mary Carrion MA

## 2021-09-01 ENCOUNTER — OFFICE VISIT (OUTPATIENT)
Dept: URGENT CARE | Facility: URGENT CARE | Age: 26
End: 2021-09-01
Payer: COMMERCIAL

## 2021-09-01 VITALS
SYSTOLIC BLOOD PRESSURE: 148 MMHG | OXYGEN SATURATION: 99 % | DIASTOLIC BLOOD PRESSURE: 91 MMHG | HEART RATE: 87 BPM | TEMPERATURE: 99 F

## 2021-09-01 DIAGNOSIS — H60.393 INFECTIVE OTITIS EXTERNA, BILATERAL: Primary | ICD-10-CM

## 2021-09-01 PROCEDURE — 99213 OFFICE O/P EST LOW 20 MIN: CPT | Performed by: FAMILY MEDICINE

## 2021-09-01 RX ORDER — NEOMYCIN SULFATE, POLYMYXIN B SULFATE, HYDROCORTISONE 3.5; 10000; 1 MG/ML; [USP'U]/ML; MG/ML
3 SOLUTION/ DROPS AURICULAR (OTIC) 4 TIMES DAILY
Qty: 10 ML | Refills: 0 | Status: SHIPPED | OUTPATIENT
Start: 2021-09-01 | End: 2021-09-07

## 2021-09-01 NOTE — PATIENT INSTRUCTIONS
Patient Education     External Ear Infection (Adult)    External otitis (also called  swimmer s ear ) is an infection in the ear canal. It's often caused by bacteria or fungus. It can occur a few days after water gets trapped in the ear canal (from swimming or bathing). It can also occur after cleaning too deeply in the ear canal with a cotton swab or other object. Sometimes, hair care products get into the ear canal and cause this problem.   Symptoms can include pain, fever, itching, redness, drainage, or swelling of the ear canal. Temporary hearing loss may also occur.   Home care    Don't try to clean the ear canal. This can push pus and bacteria deeper into the canal.    Use prescribed ear drops as directed. These help reduce swelling and fight the infection. If an ear wick was placed in the ear canal, apply drops right onto the end of the wick. The wick will draw the medicine into the ear canal even if it's swollen closed.    A cotton ball may be loosely placed in the outer ear to absorb any drainage.    You may use over-the-counter medicines to control pain as directed by the healthcare provider, unless another medicine was prescribed. Talk with your provider before using these medicines if you have chronic liver or kidney disease or ever had a stomach ulcer or digestive tract bleeding.    Don't allow water to get into your ear when bathing. Also don't swim until the infection has cleared.    Prevention    Keep your ears dry. This helps lower the risk of infection. Dry your ears with a towel or hair dryer after getting wet. Also, use ear plugs when swimming.    Don't stick any objects in the ear to remove wax.    Talk with your provider about using ear drops to prevent swimmer's ear in case you feel water trapped in your ear canal. You can get these drops over the counter at most drugstores. They work by removing water from the ear canal.    Follow-up care  Follow up with your healthcare provider in 1 week,  or as advised.   When to seek medical advice  Call your healthcare provider right away if any of these occur:     Ear pain becomes worse or doesn t improve after 3 days of treatment    Redness or swelling of the outer ear occurs or gets worse    Headache    Fever of 100.4 F (38 C) or higher, or as directed by your healthcare provider  Call 911  Call 911 or get immediate medical care if any of the following occur:     Seizure    Unusual drowsiness or confusion    Unusual painful or stiff neck    [employer] last reviewed this educational content on 8/1/2020 2000-2021 [employer]. All rights reserved. This information is not intended as a substitute for professional medical care. Always follow your healthcare professional's instructions.

## 2021-09-01 NOTE — PROGRESS NOTES
ASSESSMENT/ PLAN  Infective otitis externa, bilateral     - neomycin-polymyxin-hydrocortisone (CORTISPORIN) 3.5-19681-8 otic solution; Place 3 drops into both ears 4 times daily for 6 days     Symptomatic relief of pain and fever with acetaminophen and/or ibuprofen   May apply a warm pack to the region of the ear for symptomatic relief  --------------------------------------------------------------------------------------------------------------------------------------      SUBJECTIVE:  Chief Complaint   Patient presents with     Urgent Care     Otalgia     bilateral ear pain x 4-5 days     Susy Huerta is a 25 year old female who presents with bilateral ear pain, fullness and pressure for 5 day(s).   Severity: moderate   Timing:gradual onset, still present and worsening  Additional symptoms include none.      History of recurrent otitis: no    Past Medical History:   Diagnosis Date     Menorrhagia      Obese      Patient Active Problem List   Diagnosis     Allergic rhinitis     Obesity     Adjustment disorder with mixed anxiety and depressed mood     Essential hypertension     Family history of DVT     Major depressive disorder, recurrent episode, mild (H)     Eczema, unspecified eczema     BMI 50.0-59.9, adult (H)     Pre-diabetes     Contraception     External hemorrhoid       ALLERGIES:  Amoxicillin, Sulfa drugs, Cefprozil, and Cefzil [cefprozil]    metFORMIN (GLUCOPHAGE) 500 MG tablet, TAKE 1 TABLET BY MOUTH TWICE DAILY WITH MEALS  sertraline (ZOLOFT) 100 MG tablet, Take 2 tablets daily    medroxyPROGESTERone (DEPO-PROVERA) injection 150 mg  medroxyPROGESTERone (DEPO-PROVERA) injection 150 mg        Social History     Tobacco Use     Smoking status: Never Smoker     Smokeless tobacco: Never Used   Substance Use Topics     Alcohol use: No     Alcohol/week: 0.0 standard drinks       Family History   Problem Relation Age of Onset     Respiratory Maternal Grandmother         emphasema     Hypertension  Maternal Grandmother      Other Cancer Maternal Grandmother         lung cancer     Cancer - colorectal Maternal Grandfather         Diagnosed at age 60     Hypertension Maternal Grandfather      Hypertension Mother      Other - See Comments Father         Not involved in her life     Arthritis Sister         SHAYLA     Pulmonary Embolism Sister         On Virginie, Mom thinks [facility] Leiden neg     Hypertension Maternal Uncle      Breast Cancer No family hx of          ROS:   CONSTITUTIONAL:NEGATIVE for fever, chills,    INTEGUMENTARY/SKIN: NEGATIVE for worrisome rashes,  or lesions  EYES: NEGATIVE for vision changes or irritation  RESP:NEGATIVE for significant cough or SOB  GI: NEGATIVE for nausea, abdominal pain,   or change in bowel habits    OBJECTIVE:  BP (!) 148/91   Pulse 87   Temp 99  F (37.2  C) (Tympanic)   SpO2 99%   Breastfeeding No    EXAM:  The right TM is normal: no effusions, no erythema, and normal landmarks     The right auditory canal is erythematous, swollen and tender  The left TM is normal: no effusions, no erythema, and normal landmarks  The left auditory canal is erythematous, swollen and tender  Oropharynx exam is normal: no lesions, erythema, adenopathy or exudate.  GENERAL: no acute distress  EYES: EOMI,  PERRL, conjunctiva clear  NECK: supple, non-tender to palpation, no adenopathy noted  RESP: lungs clear to auscultation - no rales, rhonchi or wheezes  CV: regular rates and rhythm, normal S1 S2, no murmur noted  SKIN: no suspicious lesions or rashes

## 2021-09-19 ENCOUNTER — HEALTH MAINTENANCE LETTER (OUTPATIENT)
Age: 26
End: 2021-09-19

## 2021-10-12 ENCOUNTER — TELEPHONE (OUTPATIENT)
Dept: PEDIATRICS | Facility: CLINIC | Age: 26
End: 2021-10-12

## 2021-10-12 ENCOUNTER — ALLIED HEALTH/NURSE VISIT (OUTPATIENT)
Dept: PEDIATRICS | Facility: CLINIC | Age: 26
End: 2021-10-12
Payer: COMMERCIAL

## 2021-10-12 VITALS — SYSTOLIC BLOOD PRESSURE: 142 MMHG | DIASTOLIC BLOOD PRESSURE: 80 MMHG

## 2021-10-12 DIAGNOSIS — Z78.9 USES BIRTH CONTROL: Primary | ICD-10-CM

## 2021-10-12 PROCEDURE — 96372 THER/PROPH/DIAG INJ SC/IM: CPT | Performed by: ADVANCED PRACTICE MIDWIFE

## 2021-10-12 PROCEDURE — 99207 PR NO CHARGE NURSE ONLY: CPT

## 2021-10-12 RX ADMIN — MEDROXYPROGESTERONE ACETATE 150 MG: 150 INJECTION, SUSPENSION INTRAMUSCULAR at 12:00

## 2021-10-12 NOTE — TELEPHONE ENCOUNTER
Pt needs her next depo scheduled for between 12/28/21 and 1/1/22, preferably at 3:30. [facility] schedule is still closed. Please call patient on her cell when [facility] schedule is open to schedule.    Gloria Gonzales on 10/12/2021 at 12:24 PM

## 2021-10-12 NOTE — PROGRESS NOTES
BP: 142/80    LAST PAP/EXAM: No results found for: PAP  URINE HCG:not indicated    The following medication was given:     MEDICATION: Depo Provera 150mg  ROUTE: IM  SITE: Ventrogluteal - Right  : Aj  LOT #: [id number]  EXP: 04/30/2023  NEXT INJECTION DUE: 12/28/21 - 1/11/22   Provider: Devin

## 2021-10-21 DIAGNOSIS — R73.03 PRE-DIABETES: ICD-10-CM

## 2021-10-22 NOTE — TELEPHONE ENCOUNTER
Routing refill request to provider for review/approval because:  Biguanide Agents Iotccv02/21/2021 02:56 PM   Patient's CR is NOT>1.4 OR Patient's EGFR is NOT<45 within past 12 mos.     Creatinine   Date Value Ref Range Status   07/27/2016 0.62 0.50 - 1.00 mg/dL Final     Gloria Hylton RN

## 2022-01-09 ENCOUNTER — HEALTH MAINTENANCE LETTER (OUTPATIENT)
Age: 27
End: 2022-01-09

## 2022-01-19 ENCOUNTER — ALLIED HEALTH/NURSE VISIT (OUTPATIENT)
Dept: PEDIATRICS | Facility: CLINIC | Age: 27
End: 2022-01-19
Payer: COMMERCIAL

## 2022-01-19 VITALS — DIASTOLIC BLOOD PRESSURE: 78 MMHG | SYSTOLIC BLOOD PRESSURE: 124 MMHG

## 2022-01-19 DIAGNOSIS — Z30.42 DEPO-PROVERA CONTRACEPTIVE STATUS: Primary | ICD-10-CM

## 2022-01-19 LAB — HCG UR QL: NEGATIVE

## 2022-01-19 PROCEDURE — 99207 PR NO CHARGE NURSE ONLY: CPT

## 2022-01-19 PROCEDURE — 96372 THER/PROPH/DIAG INJ SC/IM: CPT | Performed by: PHYSICIAN ASSISTANT

## 2022-01-19 PROCEDURE — 81025 URINE PREGNANCY TEST: CPT

## 2022-01-19 RX ADMIN — MEDROXYPROGESTERONE ACETATE 150 MG: 150 INJECTION, SUSPENSION INTRAMUSCULAR at 16:09

## 2022-01-19 NOTE — PROGRESS NOTES
BP: Data Unavailable    LAST PAP/EXAM: No results found for: PAP  URINE HCG:negative    The following medication was given:     MEDICATION: Depo Provera 150mg  ROUTE: IM  SITE: Ventrogluteal - Right  : Nataliia  LOT #: [id number]  EXP:8/23  NEXT INJECTION DUE: 4/6/22 - 4/20/22   Provider: Yoahn Beltran

## 2022-04-14 ENCOUNTER — ALLIED HEALTH/NURSE VISIT (OUTPATIENT)
Dept: PEDIATRICS | Facility: CLINIC | Age: 27
End: 2022-04-14
Payer: COMMERCIAL

## 2022-04-14 VITALS — DIASTOLIC BLOOD PRESSURE: 66 MMHG | SYSTOLIC BLOOD PRESSURE: 140 MMHG

## 2022-04-14 DIAGNOSIS — Z78.9 USES BIRTH CONTROL: Primary | ICD-10-CM

## 2022-04-14 PROCEDURE — 99207 PR NO CHARGE NURSE ONLY: CPT

## 2022-04-14 PROCEDURE — 96372 THER/PROPH/DIAG INJ SC/IM: CPT | Performed by: PHYSICIAN ASSISTANT

## 2022-04-14 RX ADMIN — MEDROXYPROGESTERONE ACETATE 150 MG: 150 INJECTION, SUSPENSION INTRAMUSCULAR at 15:49

## 2022-04-14 NOTE — PROGRESS NOTES
BP: 140/66    LAST PAP/EXAM: No results found for: PAP  URINE HCG:not indicated    The following medication was given:     MEDICATION: Depo Provera 150mg  ROUTE: IM  SITE: Ventrogluteal - Left  : Mylan  LOT #: [id number]  EXP:7/2023  NEXT INJECTION DUE: 6/30/22 - 7/14/22   Provider: Marissa Donis LPN

## 2022-05-01 ENCOUNTER — HEALTH MAINTENANCE LETTER (OUTPATIENT)
Age: 27
End: 2022-05-01

## 2022-05-24 DIAGNOSIS — R73.03 PRE-DIABETES: ICD-10-CM

## 2022-05-24 DIAGNOSIS — F43.23 ADJUSTMENT DISORDER WITH MIXED ANXIETY AND DEPRESSED MOOD: ICD-10-CM

## 2022-05-25 NOTE — TELEPHONE ENCOUNTER
Routing refill request to provider for review/approval because:  Labs not current:  a1c, creatinine  Visit past due  Pt has appointment 6/30, ok for extension?  Marisabel Kitchen RN, BSN  [employer]

## 2022-05-26 RX ORDER — SERTRALINE HYDROCHLORIDE 100 MG/1
TABLET, FILM COATED ORAL
Qty: 180 TABLET | Refills: 0 | Status: SHIPPED | OUTPATIENT
Start: 2022-05-26 | End: 2022-11-28

## 2022-05-26 NOTE — TELEPHONE ENCOUNTER
Routing refill request to provider for review/approval because:  PHQ-9 score is out of date  PHQ-9 score:    PHQ 5/3/2021   PHQ-9 Total Score 2   Q9: Thoughts of better off dead/self-harm past 2 weeks Not at all      Pt needs to be seen by PCP as it has been over 6 months since last visit.

## 2022-05-31 ENCOUNTER — OFFICE VISIT (OUTPATIENT)
Dept: PODIATRY | Facility: CLINIC | Age: 27
End: 2022-05-31
Payer: COMMERCIAL

## 2022-05-31 VITALS
HEIGHT: 70 IN | BODY MASS INDEX: 41.95 KG/M2 | DIASTOLIC BLOOD PRESSURE: 68 MMHG | WEIGHT: 293 LBS | SYSTOLIC BLOOD PRESSURE: 124 MMHG

## 2022-05-31 DIAGNOSIS — M79.671 FOOT PAIN, BILATERAL: Primary | ICD-10-CM

## 2022-05-31 DIAGNOSIS — M72.2 PLANTAR FASCIITIS, BILATERAL: ICD-10-CM

## 2022-05-31 DIAGNOSIS — M21.41 BILATERAL PES PLANUS: ICD-10-CM

## 2022-05-31 DIAGNOSIS — M21.42 BILATERAL PES PLANUS: ICD-10-CM

## 2022-05-31 DIAGNOSIS — M79.672 FOOT PAIN, BILATERAL: Primary | ICD-10-CM

## 2022-05-31 PROCEDURE — 20550 NJX 1 TENDON SHEATH/LIGAMENT: CPT | Mod: 50 | Performed by: PODIATRIST

## 2022-05-31 PROCEDURE — 99203 OFFICE O/P NEW LOW 30 MIN: CPT | Mod: 25 | Performed by: PODIATRIST

## 2022-05-31 RX ORDER — TRIAMCINOLONE ACETONIDE 40 MG/ML
40 INJECTION, SUSPENSION INTRA-ARTICULAR; INTRAMUSCULAR
Status: DISCONTINUED | OUTPATIENT
Start: 2022-05-31 | End: 2022-09-22

## 2022-05-31 RX ORDER — DICLOFENAC SODIUM 75 MG/1
75 TABLET, DELAYED RELEASE ORAL 2 TIMES DAILY
Qty: 28 TABLET | Refills: 0 | Status: SHIPPED | OUTPATIENT
Start: 2022-05-31 | End: 2024-01-13

## 2022-05-31 RX ORDER — BUPIVACAINE HYDROCHLORIDE 5 MG/ML
2 INJECTION, SOLUTION EPIDURAL; INTRACAUDAL
Status: DISCONTINUED | OUTPATIENT
Start: 2022-05-31 | End: 2022-09-22

## 2022-05-31 RX ADMIN — TRIAMCINOLONE ACETONIDE 40 MG: 40 INJECTION, SUSPENSION INTRA-ARTICULAR; INTRAMUSCULAR at 09:53

## 2022-05-31 RX ADMIN — BUPIVACAINE HYDROCHLORIDE 2 ML: 5 INJECTION, SOLUTION EPIDURAL; INTRACAUDAL at 09:53

## 2022-05-31 NOTE — PROGRESS NOTES
PATIENT HISTORY:    Susy Huerta is a 26 year old female who presents to clinic for pain to both feet.  Patient is here with her mom.  Notes that the pain has been going on for about 3 months.  Will be aching throbbing pain.  Worse when she gets up from sitting or up in the morning but can be when she is standing for a long time.  She is been trying icing, Ace wrap's in her shoes.  States it has not really changed.  Denies specific injury.  She is wondering what is causing the pain and what can be done to get rid of it.    Review of Systems:  Patient denies fever, chills, rash, wound, stiffness,  numbness, weakness, heart burn, blood in stool, chest pain with activity, calf pain when walking, shortness of breath with activity, chronic cough, easy bleeding/bruising, swelling of ankles, excessive thirst, fatigue, depression, anxiety.  Patient admits to being.     PAST MEDICAL HISTORY:   Past Medical History:   Diagnosis Date     Menorrhagia      Obese         PAST SURGICAL HISTORY:   Past Surgical History:   Procedure Laterality Date     DILATION AND CURETTAGE, HYSTEROSCOPY DIAGNOSTIC, COMBINED N/A 6/23/2015    Procedure: COMBINED DILATION AND CURETTAGE, HYSTEROSCOPY DIAGNOSTIC;  Surgeon: Soraida Rosado MD;  Location:  OR        MEDICATIONS:   Current Outpatient Medications:      metFORMIN (GLUCOPHAGE) 500 MG tablet, TAKE 1 TABLET BY MOUTH TWICE DAILY WITH MEALS, Disp: 180 tablet, Rfl: 0     sertraline (ZOLOFT) 100 MG tablet, TAKE 2 TABLETS BY MOUTH DAILY. Visit needed prior to additional refills., Disp: 180 tablet, Rfl: 0    Current Facility-Administered Medications:      medroxyPROGESTERone (DEPO-PROVERA) injection 150 mg, 150 mg, Intramuscular, Q90 Days, Nathaly Mcclellan PA-C, 150 mg at 04/14/22 5232     ALLERGIES:    Allergies   Allergen Reactions     Amoxicillin Anaphylaxis     Other reaction(s): Edema,generalized     Sulfa Drugs Other (See Comments), Difficulty breathing and Hives      Other reaction(s): Edema,generalized  n/v     Cefprozil      PN: LW Reaction: HIVES     Cefzil [Cefprozil] Hives        SOCIAL HISTORY:   Social History     Socioeconomic History     Marital status: Single     Spouse name: Not on file     Number of children: Not on file     Years of education: Not on file     Highest education level: Not on file   Occupational History     Not on file   Tobacco Use     Smoking status: Never Smoker     Smokeless tobacco: Never Used   Substance and Sexual Activity     Alcohol use: No     Alcohol/week: 0.0 standard drinks     Drug use: No     Sexual activity: Never   Other Topics Concern      Service Not Asked     Blood Transfusions Not Asked     Caffeine Concern Not Asked     Occupational Exposure Not Asked     Hobby Hazards Not Asked     Sleep Concern Not Asked     Stress Concern Not Asked     Weight Concern Not Asked     Special Diet Not Asked     Back Care Not Asked     Exercise Not Asked     Bike Helmet Not Asked     Seat Belt Not Asked     Self-Exams Not Asked     Parent/sibling w/ CABG, MI or angioplasty before 65F 55M? No   Social History Narrative    moved from [place], [place] 2003; lives with mom and 2 sisters    flys, with mom, back to [place] to visit father 1-3x/month.  Doesn't like flying.        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        Enjoy reading (adventure)        Stays active by walking on trails, doing errands.      Social Determinants of Health     Financial Resource Strain: Not on file   Food Insecurity: Not on file   Transportation Needs: Not on file   Physical Activity: Not on file   Stress: Not on file   Social Connections: Not on file   Intimate Partner Violence: Not on file   Housing Stability: Not on file        FAMILY HISTORY:   Family History   Problem Relation Age of Onset     Respiratory Maternal Grandmother         emphasema     Hypertension Maternal Grandmother      Other Cancer Maternal  "Grandmother         lung cancer     Cancer - colorectal Maternal Grandfather         Diagnosed at age 60     Hypertension Maternal Grandfather      Hypertension Mother      Other - See Comments Father         Not involved in her life     Arthritis Sister         SHAYLA     Pulmonary Embolism Sister         On [place], Mom thinks [facility] Leiden neg     Hypertension Maternal Uncle      Breast Cancer No family hx of         EXAM:Vitals: /68   Ht 1.778 m (5' 10\")   Wt (!) 168.7 kg (372 lb)   BMI 53.38 kg/m      General appearance: Patient is alert and fully cooperative with history & exam.  No sign of distress is noted during the visit.     Psychiatric: Affect is pleasant & appropriate.  Patient appears motivated to improve health.     Respiratory: Breathing is regular & unlabored while sitting.     HEENT: Hearing is intact to spoken word.  Speech is clear.  No gross evidence of visual impairment that would impact ambulation.     Dermatologic: Skin is intact to both lower extremities without significant lesions, rash or abrasion.  No paronychia or evidence of soft tissue infection is noted.     Vascular: DP & PT pulses are intact & regular bilaterally.  No significant edema or varicosities noted.  CFT and skin temperature is normal to both lower extremities.     Neurologic: Lower extremity sensation is intact to light touch.  No evidence of weakness or contracture in the lower extremities.  No evidence of neuropathy.     Musculoskeletal: Patient is ambulatory without assistive device or brace.  Plantar medial heels bilaterally.  Increased arch height bilaterally.     ASSESSMENT:    Foot pain, bilateral  Bilateral pes planus  Plantar fasciitis, bilateral  BMI 50.0-59.9, adult (H)     Medical Decision Making/Plan:  Reviewed patient's chart in [facility]. The potential causes and nature of plantar fasciitis were discussed with the patient.  We reviewed the natural history/prognosis of the condition and risks if left untreated.  " These include chronic pain, other sites of pain due to gait changes, and potential plantar fascial rupture.      We discussed possible causes of the condition as it relates to the patients specific situation.      Conservative treatment options were reviewed:  appropriate shoes, avoidance of barefoot walking, inserts/orthoses, stretching, ice, massage, immobilization and NSAIDs.     We also reviewed the options of injection therapy and surgery.  However, it was made clear that surgery is only considered when conservative therapy fails.  The risks and benefits of injection therapy, and surgery were discussed.     After thorough discussion and answering all questions, the patient elected to try injections in both heels today.  Please see procedure note below.  We will also have her wear ankle braces for the next 1 to 2 weeks until the cortisone is fully effective.  We will also do an oral anti-inflammatory to try to help with pain.  She can use over-the-counter topical pain creams such as Voltaren gel to the area as well.  We will have her do stretches.  Recommend inserts in the shoes was given information on super feet.  Also recommend not going barefoot or in socks around the house and having something supportive on at all times.  Pain does not improve recommend MRIs of the ankles or physical therapy with iontophoresis..  All questions were answered to patient satisfaction she will call for the questions or concerns.    Procedure:  Medium Joint Injection/Arthrocentesis: bilateral ankle    Date/Time: 5/31/2022 9:53 AM  Performed by: Sadaf Hawkins DPM, Podiatry/Foot and Ankle Surgery  Authorized by: Sadaf Hawkins DPM, Podiatry/Foot and Ankle Surgery     Indications:  Pain  Needle Size:  25 G  Guidance: surface landmarks    Approach:  Medial  Location:  Ankle  Location comment:  Bilateral plantar fascial cortisone injections  Laterality:  Bilateral  Site:  Bilateral ankle  Medications (Right):  40 mg triamcinolone  40 MG/ML; 2 mL bupivacaine (PF) 0.5 %  Medications (Left):  40 mg triamcinolone 40 MG/ML; 2 mL bupivacaine (PF) 0.5 %  Outcome:  Tolerated well, no immediate complications  Procedure discussed: discussed risks, benefits, and alternatives    Consent Given by:  Patient  Timeout: timeout called immediately prior to procedure    Prep: patient was prepped and draped in usual sterile fashion            Patient risk factor: Patient is at low risk for infection.        Sadaf Hawkins DPM, Podiatry/Foot and Ankle Surgery     I attest my time as attending is greater than 50% of the total combined time spent on qualifying patient care activities by the PA/NP and attending. I attest my time as attending is greater than 50% of the total combined time spent on qualifying patient care activities by the PA/NP and attending. I attest my time as attending is greater than 50% of the total combined time spent on qualifying patient care activities by the PA/NP and attending.

## 2022-05-31 NOTE — PATIENT INSTRUCTIONS
Thank you for choosing [facility] Podiatry / Foot & Ankle Surgery!    DR KEITH'S CLINIC:  [facility]   [address]   [place] [id number]      TRIAGE LINE: [phone]  APPOINTMENTS: [phone]  RADIOLOGY: [phone]  SET UP SURGERY: [phone]  FAX NUMBER: [phone]  BILLING QUESTIONS: [phone]       Follow up: As needed      [facility]  [place]  [address]   [place] [address]  Ph: [phone]  Fax: [phone] [place] [facility]  [address]  [place] [address]  Ph: [phone]  Fax: [phone]   [place]  [address]   [place] [address]  Ph: [phone]  Fax: [phone] [place]  [address]  [place] [address]   Ph: [phone]    [place]  [address]  [place] [address]   Ph: [phone]  [place]  [address]  [place] [address]   Ph: [phone]      [place]  [id number]  [place] [address]  Ph: [phone]  [place]  [address]   [place] [address]  Ph: [phone]  Fax: [phone]      PLANTAR FASCIITIS  Plantar fasciitis is often referred to as heel spurs or heel pain. Plantar fasciitis is a very common problem that affects people of all foot shapes, age, weight and activity level. Pain may be in the arch or on the weight-bearing surface of the heel. The pain may come on without injury or identifiable cause. Pain is generally present when first getting out of bed in the morning or up from a seated break.     CAUSES  The plantar fascia is a dense fibrous band of tissue that stretches across the bottom surface of the foot. The fascia helps support the foot muscles and arch. Plantar fasciitis is thought to be caused by mechanical strain or overload. Frequent walking without shoes or wearing unsupportive shoes is thought to cause structural overload and ultimately inflammation of the plantar fascia. Some people have heel spurs  that can be seen on x-ray. The heel spur is actually a minor component of plantar fascitis and is largely ignored.       SELF TREATMENT   The easiest solution is to stop walking around your home without shoes. Plantar fasciitis is largely a shoe problem. Shoes are either not being worn often enough or your current shoes are inadequate for your weight, foot structure or activity level. The majority of shoes on the market today are not sufficient to resist development of plantar fasciitis or to promote healing. Assume that your current shoes are inadequate and will need to be replaced. Even high quality shoes wear out with 6 months to one year of frequent use. Weight loss is another option. Losing ten pounds in the next two months may be enough to resolve the problem. Ice applied to the area of pain two to three times per day for ten minutes each session can be very helpful. Warm foot soaks in epsom salts can also relieve pain. This should continue until the problem resolves. Achilles tendon stretching is essential. Stretch multiple times daily to promote healing and to prevent recurrence in the future. Over all stretching of the body is helpful as well such as the calves, thighs and lower back. Normally when one area of the body is tight, other areas are too. Gentle Yoga can be good for this.     Over the counter topical anti inflammatories can be helpful such as biofreeze, bengay, salon pas, ect...  Oral ibuprofen or aleve is recommended as well to try to calm down inflammation.     Night splints can be helpful to gradually stretch the foot at night as a lot of pain is when you get up in the morning. Taking a towel or thera band and stretching the foot back multiple times before you get ou of bed can be beneficial as well.     MEDICAL TREATMENT  Medical treatments often include custom arch supports, cortisone injections, physical therapy, splints to be worn in bed, prescription medications and surgery. The home  treatments listed above will be necessary regardless of these advanced medical treatments. Surgery is rarely needed but is very helpful in selected cases.     PROGNOSIS  Plantar fasciitis can last from one day to a lifetime. Some people get intermittent fascitis that is very short-lived. Others suffer daily for years. Excessive body weight, frequent bare foot walking, long hours on the feet, inadequate shoes, predisposing foot structures and excessive activity such as running are all potential issues that lead to chronic and/or recurring plantar fascitis. Having plantar fasciitis means that you are forever prone to this problem and will require modification of some of the above factors. Most people seek treatment within one to four months. Healing usually requires a similar one to four month time frame. Healing time is relative to the amount of effort spent treating the problem.   Plantar fasciitis is highly recurrent. Risk factors often continue, including return to bare foot walking, inadequate shoes, excessive body weight, excessive activities, etc. Your life style and foot structure may predispose you to recurrent plantar fasciitis. A daily prevention regimen can be very helpful. Ongoing use of shoe inserts, careful attention to appropriate shoes, daily Achilles stretching, etc. may prevent recurrence. Prompt attention at the earliest warning signs of heel pain can resolve the problem in as short as a few days.     EXERCISES  Stair Exercise: Step on the stairs with the ball of your foot and hold your position for at least 15 seconds, then slowly step down with the heels of your foot. You can do this daily and as often as you want.   Picking the Towel: Sit comfortably and then pick the towel up with your toes. You can use any object other than a towel as long as the material can be soft and you can pick it up with your toes.  Rolling the Bottle: Use a small ball or frozen water bottle and then roll it around with  your foot.   Flex the Toes: Sit comfortably and then flex your toes by pointing it towards the floor or towards your body. This will relax and flex your foot and exercise your plantar fascia, the calf, and the Achilles tendon. The inability of the foot to stretch often causes the bunching up of the plantar fascia area leading to the pain.  Calf/Achilles Stretching: Lay on you back and raise one foot, then point your toes towards the floor. See photo below:               Hold each stretch for 10 seconds. Stretch 10 times per set, three sets per day. Morning, afternoon and evening. If your heel pain is very severe in the morning, consider doing the first set of stretches before you get out of bed.      OVER THE COUNTER INSERT RECOMMENDATIONS  SuperFeet   Sofsole Fit Spenco   Power Step   Walk-Fit Arch Cradles     Most of these can be found at your local [employer], sporting [employer] stores, or online.  **A good high quality over the counter insert should cost around $40-$50      [place] LOCATIONS  [address]  [phone]   [address]  [phone] [place]  [address] [place] [address]  [phone]   [place]  [address]  [phone]   [place]  [address]  [phone] [place] [address]  [phone]   [place]  [address]  [phone]   [place]  [address]  [phone] [place]  [address]  [phone]

## 2022-05-31 NOTE — LETTER
5/31/2022         RE: Susy Huerta  [address] [place] [address]        Dear Colleague,    Thank you for referring your patient, Susy Huerta, to the [facility]. Please see a copy of my visit note below.    PATIENT HISTORY:    Susy Huerta is a 26 year old female who presents to clinic for pain to both feet.  Patient is here with her mom.  Notes that the pain has been going on for about 3 months.  Will be aching throbbing pain.  Worse when she gets up from sitting or up in the morning but can be when she is standing for a long time.  She is been trying icing, Ace wrap's in her shoes.  States it has not really changed.  Denies specific injury.  She is wondering what is causing the pain and what can be done to get rid of it.    Review of Systems:  Patient denies fever, chills, rash, wound, stiffness,  numbness, weakness, heart burn, blood in stool, chest pain with activity, calf pain when walking, shortness of breath with activity, chronic cough, easy bleeding/bruising, swelling of ankles, excessive thirst, fatigue, depression, anxiety.  Patient admits to being.     PAST MEDICAL HISTORY:   Past Medical History:   Diagnosis Date     Menorrhagia      Obese         PAST SURGICAL HISTORY:   Past Surgical History:   Procedure Laterality Date     DILATION AND CURETTAGE, HYSTEROSCOPY DIAGNOSTIC, COMBINED N/A 6/23/2015    Procedure: COMBINED DILATION AND CURETTAGE, HYSTEROSCOPY DIAGNOSTIC;  Surgeon: Soraida Rosado MD;  Location:  OR        MEDICATIONS:   Current Outpatient Medications:      metFORMIN (GLUCOPHAGE) 500 MG tablet, TAKE 1 TABLET BY MOUTH TWICE DAILY WITH MEALS, Disp: 180 tablet, Rfl: 0     sertraline (ZOLOFT) 100 MG tablet, TAKE 2 TABLETS BY MOUTH DAILY. Visit needed prior to additional refills., Disp: 180 tablet, Rfl: 0    Current Facility-Administered Medications:      medroxyPROGESTERone (DEPO-PROVERA) injection 150 mg, 150 mg,  Intramuscular, Q90 Days, Nathaly Mcclellan PA-C, 150 mg at 04/14/22 0619     ALLERGIES:    Allergies   Allergen Reactions     Amoxicillin Anaphylaxis     Other reaction(s): Edema,generalized     Sulfa Drugs Other (See Comments), Difficulty breathing and Hives     Other reaction(s): Edema,generalized  n/v     Cefprozil      PN: LW Reaction: HIVES     Cefzil [Cefprozil] Hives        SOCIAL HISTORY:   Social History     Socioeconomic History     Marital status: Single     Spouse name: Not on file     Number of children: Not on file     Years of education: Not on file     Highest education level: Not on file   Occupational History     Not on file   Tobacco Use     Smoking status: Never Smoker     Smokeless tobacco: Never Used   Substance and Sexual Activity     Alcohol use: No     Alcohol/week: 0.0 standard drinks     Drug use: No     Sexual activity: Never   Other Topics Concern      Service Not Asked     Blood Transfusions Not Asked     Caffeine Concern Not Asked     Occupational Exposure Not Asked     Hobby Hazards Not Asked     Sleep Concern Not Asked     Stress Concern Not Asked     Weight Concern Not Asked     Special Diet Not Asked     Back Care Not Asked     Exercise Not Asked     Bike Helmet Not Asked     Seat Belt Not Asked     Self-Exams Not Asked     Parent/sibling w/ CABG, MI or angioplasty before 65F 55M? No   Social History Narrative    moved from [place], [place] 2003; lives with mom and 2 sisters    flys, with mom, back to [place] to visit father 1-3x/month.  Doesn't like flying.        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        Enjoy reading (adventure)        Stays active by walking on trails, doing errands.      Social Determinants of Health     Financial Resource Strain: Not on file   Food Insecurity: Not on file   Transportation Needs: Not on file   Physical Activity: Not on file   Stress: Not on file   Social Connections: Not on file  "  Intimate Partner Violence: Not on file   Housing Stability: Not on file        FAMILY HISTORY:   Family History   Problem Relation Age of Onset     Respiratory Maternal Grandmother         emphasema     Hypertension Maternal Grandmother      Other Cancer Maternal Grandmother         lung cancer     Cancer - colorectal Maternal Grandfather         Diagnosed at age 60     Hypertension Maternal Grandfather      Hypertension Mother      Other - See Comments Father         Not involved in her life     Arthritis Sister         SHAYLA     Pulmonary Embolism Sister         On Virginie, Mom thinks [facility] Leiden neg     Hypertension Maternal Uncle      Breast Cancer No family hx of         EXAM:Vitals: /68   Ht 1.778 m (5' 10\")   Wt (!) 168.7 kg (372 lb)   BMI 53.38 kg/m      General appearance: Patient is alert and fully cooperative with history & exam.  No sign of distress is noted during the visit.     Psychiatric: Affect is pleasant & appropriate.  Patient appears motivated to improve health.     Respiratory: Breathing is regular & unlabored while sitting.     HEENT: Hearing is intact to spoken word.  Speech is clear.  No gross evidence of visual impairment that would impact ambulation.     Dermatologic: Skin is intact to both lower extremities without significant lesions, rash or abrasion.  No paronychia or evidence of soft tissue infection is noted.     Vascular: DP & PT pulses are intact & regular bilaterally.  No significant edema or varicosities noted.  CFT and skin temperature is normal to both lower extremities.     Neurologic: Lower extremity sensation is intact to light touch.  No evidence of weakness or contracture in the lower extremities.  No evidence of neuropathy.     Musculoskeletal: Patient is ambulatory without assistive device or brace.  Plantar medial heels bilaterally.  Increased arch height bilaterally.     ASSESSMENT:    Foot pain, bilateral  Bilateral pes planus  Plantar fasciitis, bilateral  BMI " 50.0-59.9, adult (H)     Medical Decision Making/Plan:  Reviewed [facility] in [facility]. The potential causes and nature of plantar fasciitis were discussed with the patient.  We reviewed the natural history/prognosis of the condition and risks if left untreated.  These include chronic pain, other sites of pain due to gait changes, and potential plantar fascial rupture.      We discussed possible causes of the condition as it relates to the patients specific situation.      Conservative treatment options were reviewed:  appropriate shoes, avoidance of barefoot walking, inserts/orthoses, stretching, ice, massage, immobilization and NSAIDs.     We also reviewed the options of injection therapy and surgery.  However, it was made clear that surgery is only considered when conservative therapy fails.  The risks and benefits of injection therapy, and surgery were discussed.     After thorough discussion and answering all questions, the patient elected to try injections in both heels today.  Please see procedure note below.  We will also have her wear ankle braces for the next 1 to 2 weeks until the cortisone is fully effective.  We will also do an oral anti-inflammatory to try to help with pain.  She can use over-the-counter topical pain creams such as Voltaren gel to the area as well.  We will have her do stretches.  Recommend inserts in the shoes was given information on super feet.  Also recommend not going barefoot or in socks around the house and having something supportive on at all times.  Pain does not improve recommend MRIs of the ankles or physical therapy with iontophoresis..  All questions were answered to patient satisfaction she will call for the questions or concerns.    Procedure:  Medium Joint Injection/Arthrocentesis: bilateral ankle    Date/Time: 5/31/2022 9:53 AM  Performed by: Sadaf Hawkins DPM, Podiatry/Foot and Ankle Surgery  Authorized by: Sadaf Hawkins DPM, Podiatry/Foot and Ankle Surgery      Indications:  Pain  Needle Size:  25 G  Guidance: surface landmarks    Approach:  Medial  Location:  Ankle  Location comment:  Bilateral plantar fascial cortisone injections  Laterality:  Bilateral  Site:  Bilateral ankle  Medications (Right):  40 mg triamcinolone 40 MG/ML; 2 mL bupivacaine (PF) 0.5 %  Medications (Left):  40 mg triamcinolone 40 MG/ML; 2 mL bupivacaine (PF) 0.5 %  Outcome:  Tolerated well, no immediate complications  Procedure discussed: discussed risks, benefits, and alternatives    Consent Given by:  Patient  Timeout: timeout called immediately prior to procedure    Prep: patient was prepped and draped in usual sterile fashion            Patient risk factor: Patient is at low risk for infection.        Sadaf Hawkins DPM, Podiatry/Foot and Ankle Surgery        Again, thank you for allowing me to participate in the care of your patient.        Sincerely,        Sadaf Hawkins DPM, Podiatry/Foot and Ankle Surgery

## 2022-06-30 ENCOUNTER — ALLIED HEALTH/NURSE VISIT (OUTPATIENT)
Dept: PEDIATRICS | Facility: CLINIC | Age: 27
End: 2022-06-30
Payer: COMMERCIAL

## 2022-06-30 DIAGNOSIS — Z30.42 DEPO-PROVERA CONTRACEPTIVE STATUS: Primary | ICD-10-CM

## 2022-06-30 PROCEDURE — 99207 PR NO CHARGE NURSE ONLY: CPT

## 2022-06-30 PROCEDURE — 96372 THER/PROPH/DIAG INJ SC/IM: CPT | Performed by: PHYSICIAN ASSISTANT

## 2022-06-30 RX ADMIN — MEDROXYPROGESTERONE ACETATE 150 MG: 150 INJECTION, SUSPENSION INTRAMUSCULAR at 15:42

## 2022-06-30 NOTE — PROGRESS NOTES
The following medication was given: DEPO     MEDICATION: Depo Provera 150mg  ROUTE: IM  SITE: Ventrogluteal - Right  : [employer]  LOT #: [id number]  EXP:Sep/01/2023  NEXT INJECTION DUE: 9/15/22 - 9/29/22   Provider: Dr.Jason Devin Angel on 6/30/2022 at 3:44 PM

## 2022-08-08 DIAGNOSIS — R73.03 PRE-DIABETES: ICD-10-CM

## 2022-08-08 NOTE — LETTER
[facility]  [address]  [address]  [place] [address]  Phone: [phone]  Fax: [phone]        August 23, 2022      Susy Ingrid Huerta                                                                                                                                [address] [place] [address]            Dear Ms. Huerta,    We have attempted to reach you multiple times because we are concerned about your health care.  We recently provided you with a medication refill.  Many medications require routine follow-up with your Doctor.      At this time we ask that: You schedule an appointment for your annual physical and labs.    Your prescription: Has been refilled for 90 days only so you may have time for the above noted follow-up.     Please call our office at [phone] to schedule an appointment or you can schedule online through [employer] if you prefer.     If you have any questions or concerns, you can call then number mentioned above as well.    Thank you,      [facility] Care Team

## 2022-08-12 NOTE — TELEPHONE ENCOUNTER
Routing refill request to provider for review/approval because:   Patient has documented A1c within the specified period of time.    Patient's CR is NOT>1.4 OR Patient's EGFR is NOT<45 within past 12 mos.    Recent (6 mo) or future (30 days) visit within the authorizing provider's specialty     Pt has upcoming visit on 9/22/22    Jessenia Mcdaniel RN, BSN, PHN  [facility]

## 2022-08-18 NOTE — TELEPHONE ENCOUNTER
2nd attempt: called cell number, no answer and VM not set up.    Gloria Gonzales on 8/18/2022 at 2:53 PM

## 2022-08-21 ENCOUNTER — HEALTH MAINTENANCE LETTER (OUTPATIENT)
Age: 27
End: 2022-08-21

## 2022-09-22 ENCOUNTER — ALLIED HEALTH/NURSE VISIT (OUTPATIENT)
Dept: INTERNAL MEDICINE | Facility: CLINIC | Age: 27
End: 2022-09-22

## 2022-09-22 ENCOUNTER — OFFICE VISIT (OUTPATIENT)
Dept: PODIATRY | Facility: CLINIC | Age: 27
End: 2022-09-22
Payer: COMMERCIAL

## 2022-09-22 VITALS — HEIGHT: 70 IN | BODY MASS INDEX: 53.38 KG/M2

## 2022-09-22 DIAGNOSIS — M72.2 PLANTAR FASCIITIS, BILATERAL: ICD-10-CM

## 2022-09-22 DIAGNOSIS — M21.42 BILATERAL PES PLANUS: ICD-10-CM

## 2022-09-22 DIAGNOSIS — M79.671 FOOT PAIN, BILATERAL: Primary | ICD-10-CM

## 2022-09-22 DIAGNOSIS — M79.672 FOOT PAIN, BILATERAL: Primary | ICD-10-CM

## 2022-09-22 DIAGNOSIS — M21.41 BILATERAL PES PLANUS: ICD-10-CM

## 2022-09-22 DIAGNOSIS — Z30.9 CONTRACEPTIVE MANAGEMENT: Primary | ICD-10-CM

## 2022-09-22 PROCEDURE — 96372 THER/PROPH/DIAG INJ SC/IM: CPT | Performed by: PHYSICIAN ASSISTANT

## 2022-09-22 PROCEDURE — 99213 OFFICE O/P EST LOW 20 MIN: CPT | Mod: 25 | Performed by: PODIATRIST

## 2022-09-22 PROCEDURE — 20600 DRAIN/INJ JOINT/BURSA W/O US: CPT | Mod: 50 | Performed by: PODIATRIST

## 2022-09-22 PROCEDURE — 99207 PR NO CHARGE NURSE ONLY: CPT

## 2022-09-22 RX ADMIN — MEDROXYPROGESTERONE ACETATE 150 MG: 150 INJECTION, SUSPENSION INTRAMUSCULAR at 15:53

## 2022-09-22 NOTE — PATIENT INSTRUCTIONS
Thank you for choosing [facility] Podiatry / Foot & Ankle Surgery!    DR. SHUKLA'S CLINIC LOCATIONS:     [facility] TRIAGE LINE: [phone]   [address] [place] [address] APPOINTMENTS: [phone]   [place] [address] RADIOLOGY: [phone]   (Every other Tues - Wed - Fri PM) SET UP SURGERY: [phone]    BILLING QUESTIONS: [phone]   [place] [facility] FAX: [phone] [address]    [place] [address]    (Thurs & Fri AM)      Follow up: as needed    Next steps:     PLANTAR FASCIITIS  Plantar fasciitis is often referred to as heel spurs or heel pain. Plantar fasciitis is a very common problem that affects people of all foot shapes, age, weight and activity level. Pain may be in the arch or on the weight-bearing surface of the heel. The pain may come on without injury or identifiable cause. Pain is generally present when first getting out of bed in the morning or up from a seated break.     CAUSES  The plantar fascia is a dense fibrous band of tissue that stretches across the bottom surface of the foot. The fascia helps support the foot muscles and arch. Plantar fasciitis is thought to be caused by mechanical strain or overload. Frequent walking without shoes or wearing unsupportive shoes is thought to cause structural overload and ultimately inflammation of the plantar fascia. Some people have heel spurs that can be seen on x-ray. The heel spur is actually a minor component of plantar fascitis and is largely ignored.       SELF TREATMENT   The easiest solution is to stop walking around your home without shoes. Plantar fasciitis is largely a shoe problem. Shoes are either not being worn often enough or your current shoes are inadequate for your weight, foot structure or activity level. The majority of shoes on the market today are not sufficient to resist development of plantar fasciitis or to promote healing. Assume that your current shoes are inadequate and will need to be  replaced. Even high quality shoes wear out with 6 months to one year of frequent use. Weight loss is another option. Losing ten pounds in the next two months may be enough to resolve the problem. Ice applied to the area of pain two to three times per day for ten minutes each session can be very helpful. Warm foot soaks in epsom salts can also relieve pain. This should continue until the problem resolves. Achilles tendon stretching is essential. Stretch multiple times daily to promote healing and to prevent recurrence in the future. Over all stretching of the body is helpful as well such as the calves, thighs and lower back. Normally when one area of the body is tight, other areas are too. Gentle Yoga can be good for this.     Over the counter topical anti inflammatories can be helpful such as biofreeze, bengay, salon pas, ect...  Oral ibuprofen or aleve is recommended as well to try to calm down inflammation.     Night splints can be helpful to gradually stretch the foot at night as a lot of pain is when you get up in the morning. Taking a towel or thera band and stretching the foot back multiple times before you get ou of bed can be beneficial as well.     MEDICAL TREATMENT  Medical treatments often include custom arch supports, cortisone injections, physical therapy, splints to be worn in bed, prescription medications and surgery. The home treatments listed above will be necessary regardless of these advanced medical treatments. Surgery is rarely needed but is very helpful in selected cases.     PROGNOSIS  Plantar fasciitis can last from one day to a lifetime. Some people get intermittent fascitis that is very short-lived. Others suffer daily for years. Excessive body weight, frequent bare foot walking, long hours on the feet, inadequate shoes, predisposing foot structures and excessive activity such as running are all potential issues that lead to chronic and/or recurring plantar fascitis. Having plantar  fasciitis means that you are forever prone to this problem and will require modification of some of the above factors. Most people seek treatment within one to four months. Healing usually requires a similar one to four month time frame. Healing time is relative to the amount of effort spent treating the problem.   Plantar fasciitis is highly recurrent. Risk factors often continue, including return to bare foot walking, inadequate shoes, excessive body weight, excessive activities, etc. Your life style and foot structure may predispose you to recurrent plantar fasciitis. A daily prevention regimen can be very helpful. Ongoing use of shoe inserts, careful attention to appropriate shoes, daily Achilles stretching, etc. may prevent recurrence. Prompt attention at the earliest warning signs of heel pain can resolve the problem in as short as a few days.     EXERCISES  Stair Exercise: Step on the stairs with the ball of your foot and hold your position for at least 15 seconds, then slowly step down with the heels of your foot. You can do this daily and as often as you want.   Picking the Towel: Sit comfortably and then pick the towel up with your toes. You can use any object other than a towel as long as the material can be soft and you can pick it up with your toes.  Rolling the Bottle: Use a small ball or frozen water bottle and then roll it around with your foot.   Flex the Toes: Sit comfortably and then flex your toes by pointing it towards the floor or towards your body. This will relax and flex your foot and exercise your plantar fascia, the calf, and the Achilles tendon. The inability of the foot to stretch often causes the bunching up of the plantar fascia area leading to the pain.  Calf/Achilles Stretching: Lay on you back and raise one foot, then point your toes towards the floor. See photo below:               Hold each stretch for 10 seconds. Stretch 10 times per set, three sets per day. Morning, afternoon  and evening. If your heel pain is very severe in the morning, consider doing the first set of stretches before you get out of bed.      OVER THE COUNTER INSERT RECOMMENDATIONS  SuperFeet   Sofsole Fit Spenco   Power Step   Walk-Fit Arch Cradles     Most of these can be found at your local [employer], sporting [employer] stores, or online.  **A good high quality over the counter insert should cost around $40-$50      [facility]  [address]  [phone]   [address]  [phone] [place]  [address]  [phone]   [place]  [address]  [phone]   [place]  [address]  [phone] [place] [address]  [phone]   [place]  [address]  [phone]   [place]  [address] [place] [address] [phone] [place]  [address] [phone]     Heel Pain Instructions:    1)  A rigid-soled, athletic shoe like a hiking shoe is recommended.    2)  Avoid barefoot walking in your home.  It is a good idea to wear a clean athletic shoe inside.    3)  Stretch your calf musculature and plantar fascia frequently.  It is a good idea to do this before getting out of bed.    4)  Ice and ibuprofen can help if pain is related to inflammation - more likely early on in the process    5)  Some good over the counter inserts might help:  Spenco, Super Feet, others. These are found at [employer], [employer], and other large sporting [email or web address]    If pain persists, please return to clinic.  Future options include a walking boot, physical therapy, night splints, and injections.    Dr. Henrik HOOKER ORTHOTICS LOCATIONS  [facility]  [address]  [place] [address]  Phone: [phone]  Fax: [phone] [facility]  [address]  [place] [address]  Phone: [phone]   Fax: [phone]   [facility]  [place]  [address]  [place] [address]  Phone: [phone]  Fax: [phone] [facility]  [address]  [place] [address]  Phone: [phone]   Fax: [phone]   [facility]   [address]  [place] [address]  Phone: [phone]  Fax: [phone] * Please call any location listed to make an appointment for a casting/fitting. Your referral was sent to their central office and they will all have the order on file.

## 2022-09-22 NOTE — PROGRESS NOTES
ASSESSMENT:  Encounter Diagnoses   Name Primary?     Foot pain, bilateral Yes     Plantar fasciitis, bilateral      Bilateral pes planus      BMI 50.0-59.9, adult (H)      MEDICAL DECISION MAKING:  Her heel and arch pain is consistent with plantar fascial pain.  She also reported some pain along the lateral aspect of her feet.  I explained this is likely from altering her gait.  I explained that I am happy to offer repeat steroid injections.  I did stress the importance of the other conservative treatment options.  I explained that these can help the plantar fascia heal and recover.  If the injection is more of a pain reliever, not intended to hear anything.    I reviewed the importance of good arch support.  She will continue with the super feet inserts.  She is agreeable to a referral for custom molded orthoses.  I explained how stiffer soled shoes can help reduce stress on the plantar fascia during the propulsive phase of gait.  She is to avoid barefoot walking as well as other nonsupportive footwear.  We printed the after visit summary on Planter fasciitis.    Small Joint Injection/Arthrocentesis: bilateral intertarsal    Date/Time: 9/22/2022 4:04 PM  Performed by: Jordy Chester DPM  Authorized by: Jordy Chester DPM   Indications:  Pain  Needle Size:  27 G  Guidance: landmark     Approach:  Medial  Location:  Foot  Laterality:  Bilateral  Site:  Bilateral intertarsal  Medications (Right):  10 mg triamcinolone acetonide 10 MG/ML                            Consent Given by:  Patient  Timeout: timeout called immediately prior to procedure    Prep: patient was prepped and draped in usual sterile fashion       Steroid injection, bilateral plantar medial heel for chronic heel pain.            Disclaimer: This note consists of symbols derived from keyboarding, dictation and/or voice recognition software. As a result, there may be errors in the script that have gone undetected. Please consider this when  "interpreting information found in this chart.    Jordy Chester, JANAY, FACFAS, MS    [facility] of Podiatry/Foot & Ankle Surgery      ____________________________________________________________________    HPI:       Susy presents with bilateral arch and heel pain.  She has dealt with this for 6 months.  She experiences aching and pulling pain.  Her pain can be a 9 out of 10 at worst.  She has some degree of pain on a daily basis, worse with weightbearing activities.  Previous treatment included RICE therapy, night splints, ankle braces, new shoes and super feet inserts.  She saw Dr. Hawkins on 5/31/2022 and have bilateral steroid injections which provided significant pain relief.  She would like to have the injections repeated today.  She works as a  an .  *  Past Medical History:   Diagnosis Date     Menorrhagia      Obese    *  *  Past Surgical History:   Procedure Laterality Date     DILATION AND CURETTAGE, HYSTEROSCOPY DIAGNOSTIC, COMBINED N/A 6/23/2015    Procedure: COMBINED DILATION AND CURETTAGE, HYSTEROSCOPY DIAGNOSTIC;  Surgeon: Soraida Rosado MD;  Location:  OR   *  *  Current Outpatient Medications   Medication Sig Dispense Refill     metFORMIN (GLUCOPHAGE) 500 MG tablet TAKE 1 TABLET BY MOUTH TWICE DAILY WITH MEALS 180 tablet 0     sertraline (ZOLOFT) 100 MG tablet TAKE 2 TABLETS BY MOUTH DAILY. Visit needed prior to additional refills. 180 tablet 0     diclofenac (VOLTAREN) 75 MG EC tablet Take 1 tablet (75 mg) by mouth 2 times daily for 14 days 28 tablet 0         EXAM:    Vitals: Ht 1.778 m (5' 10\")   BMI 53.38 kg/m    BMI: Body mass index is 53.38 kg/m .    Constitutional:  Susy Huerta is in no apparent distress, appears well-nourished.  Cooperative with history and physical exam.    Vascular:  Pedal pulses are palpable for both the DP and PT arteries.  CFT < 3 sec.  No edema.      Neuro: Light touch sensation is intact to the L4, L5, S1 " distributions  No evidence of weakness, spasticity, or contracture in the lower extremities.     Derm: Normal texture and turgor.  No erythema, ecchymosis, or cyanosis.  No open lesions.     Musculoskeletal:    Lower extremity muscle strength is normal. No gross deformities.  Pain on palpation to the bilateral heel, plantar medial aspect.  Decrease in medial longitudinal arch height with weightbearing.

## 2022-09-22 NOTE — LETTER
9/22/2022         RE: Susy Huerta  [address] [place] [address]        Dear Colleague,    Thank you for referring your patient, Susy Huerta, to the [facility]. Please see a copy of my visit note below.    ASSESSMENT:  Encounter Diagnoses   Name Primary?     Foot pain, bilateral Yes     Plantar fasciitis, bilateral      Bilateral pes planus      BMI 50.0-59.9, adult (H)      MEDICAL DECISION MAKING:  Her heel and arch pain is consistent with plantar fascial pain.  She also reported some pain along the lateral aspect of her feet.  I explained this is likely from altering her gait.  I explained that I am happy to offer repeat steroid injections.  I did stress the importance of the other conservative treatment options.  I explained that these can help the plantar fascia heal and recover.  If the injection is more of a pain reliever, not intended to hear anything.    I reviewed the importance of good arch support.  She will continue with the super feet inserts.  She is agreeable to a referral for custom molded orthoses.  I explained how stiffer soled shoes can help reduce stress on the plantar fascia during the propulsive phase of gait.  She is to avoid barefoot walking as well as other nonsupportive footwear.  We printed the after visit summary on Planter fasciitis.    Small Joint Injection/Arthrocentesis: bilateral intertarsal    Date/Time: 9/22/2022 4:04 PM  Performed by: Jordy Chester DPM  Authorized by: Jordy Chester DPM   Indications:  Pain  Needle Size:  27 G  Guidance: landmark     Approach:  Medial  Location:  Foot  Laterality:  Bilateral  Site:  Bilateral intertarsal  Medications (Right):  10 mg triamcinolone acetonide 10 MG/ML                            Consent Given by:  Patient  Timeout: timeout called immediately prior to procedure    Prep: patient was prepped and draped in usual sterile fashion       Steroid injection, bilateral  "plantar medial heel for chronic heel pain.            Disclaimer: This note consists of symbols derived from keyboarding, dictation and/or voice recognition software. As a result, there may be errors in the script that have gone undetected. Please consider this when interpreting information found in this chart.    Jordy Chester DPM, FACFAS, MS [facility]/Foot & Ankle Surgery      ____________________________________________________________________    HPI:       Susy presents with bilateral arch and heel pain.  She has dealt with this for 6 months.  She experiences aching and pulling pain.  Her pain can be a 9 out of 10 at worst.  She has some degree of pain on a daily basis, worse with weightbearing activities.  Previous treatment included RICE therapy, night splints, ankle braces, new shoes and super feet inserts.  She saw Dr. Hawkins on 5/31/2022 and have bilateral steroid injections which provided significant pain relief.  She would like to have the injections repeated today.  She works as a  an .  *  Past Medical History:   Diagnosis Date     Menorrhagia      Obese    *  *  Past Surgical History:   Procedure Laterality Date     DILATION AND CURETTAGE, HYSTEROSCOPY DIAGNOSTIC, COMBINED N/A 6/23/2015    Procedure: COMBINED DILATION AND CURETTAGE, HYSTEROSCOPY DIAGNOSTIC;  Surgeon: Soraida Rosado MD;  Location:  OR   *  *  Current Outpatient Medications   Medication Sig Dispense Refill     metFORMIN (GLUCOPHAGE) 500 MG tablet TAKE 1 TABLET BY MOUTH TWICE DAILY WITH MEALS 180 tablet 0     sertraline (ZOLOFT) 100 MG tablet TAKE 2 TABLETS BY MOUTH DAILY. Visit needed prior to additional refills. 180 tablet 0     diclofenac (VOLTAREN) 75 MG EC tablet Take 1 tablet (75 mg) by mouth 2 times daily for 14 days 28 tablet 0         EXAM:    Vitals: Ht 1.778 m (5' 10\")   BMI 53.38 kg/m    BMI: Body mass index is 53.38 kg/m .    Constitutional:  Susy Huerta is " in no apparent distress, appears well-nourished.  Cooperative with history and physical exam.    Vascular:  Pedal pulses are palpable for both the DP and PT arteries.  CFT < 3 sec.  No edema.      Neuro: Light touch sensation is intact to the L4, L5, S1 distributions  No evidence of weakness, spasticity, or contracture in the lower extremities.     Derm: Normal texture and turgor.  No erythema, ecchymosis, or cyanosis.  No open lesions.     Musculoskeletal:    Lower extremity muscle strength is normal. No gross deformities.  Pain on palpation to the bilateral heel, plantar medial aspect.  Decrease in medial longitudinal arch height with weightbearing.          Again, thank you for allowing me to participate in the care of your patient.        Sincerely,        Jordy Chester DPM

## 2022-10-05 ENCOUNTER — TELEPHONE (OUTPATIENT)
Dept: PEDIATRICS | Facility: CLINIC | Age: 27
End: 2022-10-05

## 2022-10-05 DIAGNOSIS — R73.03 PRE-DIABETES: Primary | ICD-10-CM

## 2022-10-05 NOTE — TELEPHONE ENCOUNTER
Patient's mother, Jessi, called (Consent to Communicate on file)   - Patient's mother requested a blood panel be ordered for the patient   - Patient's mother concerned about her blood glucose levels   - Asked if the patient is experiencing any symptoms, patient's mother states that the patient sleeps a lot and drinks a lot of sugary drinks     Called patient at [phone]  - No answer, voicemail box is not set up, unable to leave a message     BMP, Hemoglobin A1c, and Lipid panel reflex to direct LDL fasting order placed on 8/12/2022 by Dr. Monreal     Patient was last seen by Dr. Beltran on 5/28/2021     Dr. Beltran, please review and advise.     Carmelina FELIX RN   Patient Advocate Liaison (PAL)  [facility]

## 2022-10-05 NOTE — TELEPHONE ENCOUNTER
The labs ordered are the correct labs for her to have drawn.   She should schedule a lab visit and fast for the blood draw.

## 2022-10-05 NOTE — TELEPHONE ENCOUNTER
Call placed to pt's Mom to schedule a lab only appt  LM to return call to the clinic    Attempted to call pt  No VM    Thank you  Moe Alarcon RN on 10/5/2022 at 4:22 PM

## 2022-10-06 NOTE — TELEPHONE ENCOUNTER
Called and spoke with patient. Mike not able to schedule lab only at this time as she does not know her work schedule. Mike will call back to schedule fasting lab only appointment.     Hank LAZAR RN 10/6/2022 at 11:01 AM

## 2022-11-11 ENCOUNTER — TELEPHONE (OUTPATIENT)
Dept: PEDIATRICS | Facility: CLINIC | Age: 27
End: 2022-11-11

## 2022-11-11 NOTE — TELEPHONE ENCOUNTER
Reason for call:  Other   Patient called regarding (reason for call):   PreAnnual labs added to chart     Additional comments:   Patient is scheduled for Annual visit and lab appt prior to it.  Please add PreAnnual labs to chart. Please note Mom was recently diagnosed with diabetes. There is a concern that PT might be diabetic per Mom.      Phone number to reach patient:  Cell number on file:    Telephone Information:   Mobile [phone]       Best Time:  any    Can we leave a detailed message on this number?  YES    Travel screening: Not Applicable

## 2022-11-26 DIAGNOSIS — F43.23 ADJUSTMENT DISORDER WITH MIXED ANXIETY AND DEPRESSED MOOD: ICD-10-CM

## 2022-11-26 DIAGNOSIS — R73.03 PRE-DIABETES: ICD-10-CM

## 2022-11-28 RX ORDER — SERTRALINE HYDROCHLORIDE 100 MG/1
TABLET, FILM COATED ORAL
Qty: 180 TABLET | Refills: 0 | Status: SHIPPED | OUTPATIENT
Start: 2022-11-28 | End: 2023-03-28

## 2022-11-28 NOTE — TELEPHONE ENCOUNTER
Routing refill request to provider for review/approval because:  JOLIE REFILLS SENT  Labs not current:  A1C  Patient needs to be seen because it has been more than 1.5  year since last office visit.  PT HAS December LAB/PROVIDER APPOINTMENT SCHEDULED, OK FOR 30 DAY INSTEAD OF 90  Marisabel Kitchen RN, BSN  [facility]

## 2022-12-14 ENCOUNTER — TELEPHONE (OUTPATIENT)
Dept: PODIATRY | Facility: CLINIC | Age: 27
End: 2022-12-14

## 2022-12-14 DIAGNOSIS — M21.42 BILATERAL PES PLANUS: ICD-10-CM

## 2022-12-14 DIAGNOSIS — M72.2 PLANTAR FASCIITIS, BILATERAL: ICD-10-CM

## 2022-12-14 DIAGNOSIS — M21.41 BILATERAL PES PLANUS: ICD-10-CM

## 2022-12-14 DIAGNOSIS — M79.671 FOOT PAIN, BILATERAL: Primary | ICD-10-CM

## 2022-12-14 DIAGNOSIS — M79.672 FOOT PAIN, BILATERAL: Primary | ICD-10-CM

## 2022-12-14 NOTE — TELEPHONE ENCOUNTER
Health Call Center    Phone Message:    Pt called, requesting the following:    Orthotics referral/order/script (whichever is needed to get orthotics).    [facility] is the closest orthotics location to the pt.     Please CALL pt with any follow up. Thank you.    Reason for Call: Other: Orthotics Referral/Order/Script     Action Taken: Message routed to:  Other: [facility]    Travel Screening: Not Applicable                                                                       1/2020

## 2022-12-14 NOTE — TELEPHONE ENCOUNTER
Orthotics Order placed per provider notes of 9/22/22 office visit.   Phone call to [facility] and order has to be signed by the provider. New order pending.     Routing to provider on call in Dr. Chester's absence.     KATALINA Sam RN

## 2022-12-19 ENCOUNTER — LAB (OUTPATIENT)
Dept: LAB | Facility: CLINIC | Age: 27
End: 2022-12-19
Payer: COMMERCIAL

## 2022-12-19 DIAGNOSIS — R73.03 PRE-DIABETES: ICD-10-CM

## 2022-12-19 LAB
ALBUMIN SERPL BCG-MCNC: 4.1 G/DL (ref 3.5–5.2)
ALP SERPL-CCNC: 85 U/L (ref 35–104)
ALT SERPL W P-5'-P-CCNC: 31 U/L (ref 10–35)
ANION GAP SERPL CALCULATED.3IONS-SCNC: 13 MMOL/L (ref 7–15)
AST SERPL W P-5'-P-CCNC: 30 U/L (ref 10–35)
BILIRUB SERPL-MCNC: 0.4 MG/DL
BUN SERPL-MCNC: 10.1 MG/DL (ref 6–20)
CALCIUM SERPL-MCNC: 9.4 MG/DL (ref 8.6–10)
CHLORIDE SERPL-SCNC: 112 MMOL/L (ref 98–107)
CHOLEST SERPL-MCNC: 154 MG/DL
CREAT SERPL-MCNC: 0.65 MG/DL (ref 0.51–0.95)
DEPRECATED HCO3 PLAS-SCNC: 18 MMOL/L (ref 22–29)
GFR SERPL CREATININE-BSD FRML MDRD: >90 ML/MIN/1.73M2
GLUCOSE SERPL-MCNC: 92 MG/DL (ref 70–99)
HBA1C MFR BLD: 5.5 % (ref 0–5.6)
HDLC SERPL-MCNC: 37 MG/DL
LDLC SERPL CALC-MCNC: 95 MG/DL
NONHDLC SERPL-MCNC: 117 MG/DL
POTASSIUM SERPL-SCNC: 4 MMOL/L (ref 3.4–5.3)
PROT SERPL-MCNC: 7.7 G/DL (ref 6.4–8.3)
SODIUM SERPL-SCNC: 143 MMOL/L (ref 136–145)
TRIGL SERPL-MCNC: 110 MG/DL

## 2022-12-19 PROCEDURE — 36415 COLL VENOUS BLD VENIPUNCTURE: CPT

## 2022-12-19 PROCEDURE — 83036 HEMOGLOBIN GLYCOSYLATED A1C: CPT

## 2022-12-19 PROCEDURE — 80053 COMPREHEN METABOLIC PANEL: CPT

## 2022-12-19 PROCEDURE — 80061 LIPID PANEL: CPT

## 2023-02-15 ENCOUNTER — NURSE TRIAGE (OUTPATIENT)
Dept: NURSING | Facility: CLINIC | Age: 28
End: 2023-02-15

## 2023-02-15 ENCOUNTER — OFFICE VISIT (OUTPATIENT)
Dept: PEDIATRICS | Facility: CLINIC | Age: 28
End: 2023-02-15
Payer: COMMERCIAL

## 2023-02-15 VITALS
WEIGHT: 293 LBS | DIASTOLIC BLOOD PRESSURE: 96 MMHG | SYSTOLIC BLOOD PRESSURE: 154 MMHG | RESPIRATION RATE: 20 BRPM | HEIGHT: 70 IN | TEMPERATURE: 98.2 F | HEART RATE: 88 BPM | BODY MASS INDEX: 41.95 KG/M2 | OXYGEN SATURATION: 100 %

## 2023-02-15 DIAGNOSIS — R73.03 PRE-DIABETES: ICD-10-CM

## 2023-02-15 DIAGNOSIS — R03.0 ELEVATED BLOOD PRESSURE READING WITHOUT DIAGNOSIS OF HYPERTENSION: ICD-10-CM

## 2023-02-15 DIAGNOSIS — B99.9 INFECTIOUS FOLLICULITIS: Primary | ICD-10-CM

## 2023-02-15 DIAGNOSIS — L73.8 INFECTIOUS FOLLICULITIS: Primary | ICD-10-CM

## 2023-02-15 DIAGNOSIS — L02.214 ABSCESS OF LEFT GROIN: ICD-10-CM

## 2023-02-15 PROCEDURE — 99214 OFFICE O/P EST MOD 30 MIN: CPT | Performed by: NURSE PRACTITIONER

## 2023-02-15 RX ORDER — DOXYCYCLINE HYCLATE 100 MG
100 TABLET ORAL 2 TIMES DAILY
Qty: 14 TABLET | Refills: 0 | Status: SHIPPED | OUTPATIENT
Start: 2023-02-15 | End: 2023-02-22

## 2023-02-15 ASSESSMENT — PATIENT HEALTH QUESTIONNAIRE - PHQ9
10. IF YOU CHECKED OFF ANY PROBLEMS, HOW DIFFICULT HAVE THESE PROBLEMS MADE IT FOR YOU TO DO YOUR WORK, TAKE CARE OF THINGS AT HOME, OR GET ALONG WITH OTHER PEOPLE: NOT DIFFICULT AT ALL
SUM OF ALL RESPONSES TO PHQ QUESTIONS 1-9: 5
SUM OF ALL RESPONSES TO PHQ QUESTIONS 1-9: 5

## 2023-02-15 ASSESSMENT — PAIN SCALES - GENERAL: PAINLEVEL: MODERATE PAIN (5)

## 2023-02-15 NOTE — PATIENT INSTRUCTIONS
Warm soaks 3-4x per day    Start antibiotic    If you develop fever, chills, or you feel the symptoms are worsening despite antibiotics you should be seen right away     Schedule with the general surgeon in the next week to discuss removal if needed -you need to call them.

## 2023-02-15 NOTE — TELEPHONE ENCOUNTER
Nurse Triage SBAR    Is this a 2nd Level Triage? YES, LICENSED PRACTITIONER REVIEW IS REQUIRED    Situation: Boil on inner thigh    Background: Patient calling, states that she noticed a lump on her inner thigh a few days ago that she though was an ingrown hair. States that since then it has gotten bigger and is now about the size of a golf ball. She states that it is tender and has a purple dot on it. She states that she has been trying to squeeze it. She denies blistering, denies drainage, denies fever.     Assessment: Boil or abscess on thigh    Protocol Recommended Disposition:   See in Office Today    Recommendation:     Patient warm transferred to scheduling for an appointment today. If none available patient will go to urgent care. Care advice given per protocol. Patient verbalized understanding and agreement with the plan of care.          Does the patient meet one of the following criteria for ADS visit consideration? No     AYAZ BUTLER RN    Reason for Disposition    Boil > 2 inches across (> 5 cm; larger than a golf ball or ping pong ball)    Additional Information    Negative: Widespread rash and bright red, sunburn-like and too weak to stand    Negative: Sounds like a life-threatening emergency to the triager    Negative: Painful lump or swelling at opening to anus (rectum)    Negative: Painful lump or swelling at opening to vagina (on labia)    Negative: Painful lump or swelling on scrotum    Negative: Doesn't match the SYMPTOMS of a boil    Negative: Widespread red rash    Negative: Black (necrotic) color or blisters develop in wound    Negative: Patient sounds very sick or weak to the triager    Negative: SEVERE pain (e.g., excruciating)    Negative: Red streak from area of infection    Negative: Fever > 100.4 F (38.0 C)    Protocols used: BOIL (SKIN ABSCESS)-A-OH

## 2023-02-15 NOTE — PROGRESS NOTES
Assessment & Plan   Infectious folliculitis  Abscess of left groin  Due to large size and no area amenable to drain here in clinic- will have her follow-up with gen surg if not improving for possible incision/drainage. We discussed emergent s/s.  - doxycycline hyclate (VIBRA-TABS) 100 MG tablet; Take 1 tablet (100 mg) by mouth 2 times daily for 7 days  - Adult General Surg Referral; Future    Prediabetes  A1c at goal. Although we discussed diabetes increases risk of infection.  Lab Results   Component Value Date    A1C 5.5 12/19/2022    A1C 5.6 05/28/2021    A1C 5.7 11/16/2018    A1C 5.6 09/08/2017    A1C 6.1 07/27/2016    A1C 5.7 04/03/2015     Elevated blood pressure reading without diagnosis of hypertension  Not on any BP meds. Recommend recheck in the next 2-3 weeks (pain could be contributing). Per chart review, BPs have been variable.    Patient Instructions   Warm soaks 3-4x per day    Start antibiotic    If you develop fever, chills, or you feel the symptoms are worsening despite antibiotics you should be seen right away     Schedule with the general surgeon in the next week to discuss removal if needed -you need to call them.      Return in about 1 week (around 2/22/2023) for Routine Visit.    Jessica Frazier NP  [facility] ASAD Jain is a 27 year old, presenting for the following health issues:  Cellulitis      History of Present Illness       Reason for visit:  Bump  Symptom onset:  3-7 days ago  Symptoms include:  Sensativity around bump  Symptom intensity:  Mild  Symptom progression:  Worsening  Had these symptoms before:  No  What makes it worse:  No  What makes it better:  No    She eats 2-3 servings of fruits and vegetables daily.She consumes 1 sweetened beverage(s) daily.She exercises with enough effort to increase her heart rate 9 or less minutes per day.  She exercises with enough effort to increase her heart rate 3 or less days per week.   She is taking  "medications regularly.    Today's PHQ-9         PHQ-9 Total Score: 5    PHQ-9 Q9 Thoughts of better off dead/self-harm past 2 weeks :   Not at all    How difficult have these problems made it for you to do your work, take care of things at home, or get along with other people: Not difficult at all     Felt a bump 1 week ago  Now larger and tender to touch  Feels pain with walking/rubbing the area  Denies fever or chills    Review of Systems         Objective    BP (!) 154/96   Pulse 88   Temp 98.2  F (36.8  C) (Oral)   Resp 20   Ht 1.778 m (5' 10\")   Wt (!) 169.2 kg (373 lb 1.6 oz)   SpO2 100%   BMI 53.53 kg/m    Body mass index is 53.53 kg/m .  Physical Exam   GENERAL: healthy, alert and no distress  SKIN: large tender nodule approx 4 cm x 4 cm with slight overlying erythema and bruising to left inguinal region                    "

## 2023-02-17 ENCOUNTER — TELEPHONE (OUTPATIENT)
Dept: PEDIATRICS | Facility: CLINIC | Age: 28
End: 2023-02-17

## 2023-02-17 ENCOUNTER — HOSPITAL ENCOUNTER (EMERGENCY)
Facility: CLINIC | Age: 28
Discharge: HOME | End: 2023-02-17
Attending: PHYSICIAN ASSISTANT | Admitting: PHYSICIAN ASSISTANT
Payer: COMMERCIAL

## 2023-02-17 VITALS
TEMPERATURE: 97.2 F | RESPIRATION RATE: 16 BRPM | OXYGEN SATURATION: 97 % | WEIGHT: 293 LBS | DIASTOLIC BLOOD PRESSURE: 114 MMHG | SYSTOLIC BLOOD PRESSURE: 155 MMHG | HEART RATE: 98 BPM | HEIGHT: 70 IN | BODY MASS INDEX: 41.95 KG/M2

## 2023-02-17 DIAGNOSIS — L02.91 CUTANEOUS ABSCESS, UNSPECIFIED SITE: ICD-10-CM

## 2023-02-17 PROCEDURE — 250N000013 HC RX MED GY IP 250 OP 250 PS 637: Performed by: PHYSICIAN ASSISTANT

## 2023-02-17 PROCEDURE — 10060 I&D ABSCESS SIMPLE/SINGLE: CPT

## 2023-02-17 PROCEDURE — 99283 EMERGENCY DEPT VISIT LOW MDM: CPT | Mod: 25

## 2023-02-17 RX ORDER — OXYCODONE HYDROCHLORIDE 5 MG/1
5 TABLET ORAL ONCE
Status: COMPLETED | OUTPATIENT
Start: 2023-02-17 | End: 2023-02-17

## 2023-02-17 RX ORDER — OXYCODONE HYDROCHLORIDE 5 MG/1
5 TABLET ORAL EVERY 6 HOURS PRN
Qty: 12 TABLET | Refills: 0 | Status: SHIPPED | OUTPATIENT
Start: 2023-02-17 | End: 2023-02-20

## 2023-02-17 RX ORDER — DIAZEPAM 5 MG
5 TABLET ORAL ONCE
Status: COMPLETED | OUTPATIENT
Start: 2023-02-17 | End: 2023-02-17

## 2023-02-17 RX ORDER — IBUPROFEN 600 MG/1
600 TABLET, FILM COATED ORAL ONCE
Status: COMPLETED | OUTPATIENT
Start: 2023-02-17 | End: 2023-02-17

## 2023-02-17 RX ORDER — LIDOCAINE HYDROCHLORIDE AND EPINEPHRINE 10; 10 MG/ML; UG/ML
INJECTION, SOLUTION INFILTRATION; PERINEURAL
Status: DISCONTINUED
Start: 2023-02-17 | End: 2023-02-17 | Stop reason: HOSPADM

## 2023-02-17 RX ADMIN — DIAZEPAM 5 MG: 5 TABLET ORAL at 10:34

## 2023-02-17 RX ADMIN — IBUPROFEN 600 MG: 600 TABLET, FILM COATED ORAL at 10:35

## 2023-02-17 RX ADMIN — OXYCODONE HYDROCHLORIDE 5 MG: 5 TABLET ORAL at 10:34

## 2023-02-17 ASSESSMENT — ACTIVITIES OF DAILY LIVING (ADL): ADLS_ACUITY_SCORE: 35

## 2023-02-17 ASSESSMENT — ENCOUNTER SYMPTOMS
CHILLS: 0
WOUND: 1
FEVER: 0

## 2023-02-17 NOTE — ED PROVIDER NOTES
"  History     Chief Complaint:  Wound Check       The history is provided by the patient.      Susy Huerta is a 27 year old female who presents with wound check. Patient reports that about 1 week ago she developed a bump between her thigh and vaginal area. She states that there is redness surrounding the bump and it has been increasing becoming more painful. She was seen at Urgent Care and prescribed Doxycycline hyclate, which she has been taking for 1 day, with no resolve. She denies fevers or chills.     Independent Historian:   None - Patient Only    Review of External Notes: Urgent care note 2/15    ROS:  Review of Systems   Constitutional: Negative for chills and fever.   Skin: Positive for wound.   All other systems reviewed and are negative.      Allergies:  Amoxicillin  Sulfa Drugs  Cefzil    Medications:    Zoloft   Metformin     Past Medical History:    Menorrhagia      Past Surgical History:    D&C      Family History:    Mother- hypertension    Social History:  Reports that she has never smoked. She has never used smokeless tobacco. She reports that she does not drink alcohol and does not use drugs.  Presents with mother  Presents via private vehicle   PCP: Yohan Beltran     Physical Exam     Patient Vitals for the past 24 hrs:   BP Temp Temp src Pulse Resp SpO2 Height Weight   02/17/23 1126 (!) 155/114 -- -- 98 -- -- -- --   02/17/23 1035 (!) 160/124 -- -- 105 -- -- -- --   02/17/23 0905 (!) 177/116 97.2  F (36.2  C) Temporal 101 16 97 % 1.778 m (5' 10\") (!) 169.3 kg (373 lb 3.8 oz)        Physical Exam  General: Well appearing, pleasant  HEENT:Conjunctive are clear. Neck range of motion intact.  Nose and throat patent.  Respiratory: Good effort  Cardiovascular: Good distal perfusion  Gastrointestinal: Nondistended  Musculoskeletal: Atraumatic  Skin: Patient has a 1 inch area of  fluctuance without induration to her left proximal inner thigh.  It is draining some bloody purulent material.  " There is no surrounding cellulitis or crepitus.  Neurologic: Alert  Psych:  Patient is cooperative    Emergency Department Course     Procedures     Incision and Drainage     Procedure: Incision and Drainage     Consent: Verbal    Indication: Abscess    Location: left inner thigh    Size: 3 cm    Ultrasound Guidance: No    Preparation: Alcohol     Anesthesia/Sedation: Lidocaine with Epinephrine - 1%     Procedure Detail:    Aspiration: No  Incision Type: Single straight  Scalpel: 11  Lesion Management: Probed and deloculated  and Irrigated   Wound Management: Packing   Packing: Gauze, 1/4 inch     Patient Status: The patient tolerated the procedure well: Yes. There were no complications.      Emergency Department Course & Assessments:    Interventions:  Medications   lidocaine 1% with EPINEPHrine 1:100,000 1 %-1:139192 injection (has no administration in time range)   ibuprofen (ADVIL/MOTRIN) tablet 600 mg (600 mg Oral Given 2/17/23 1035)   oxyCODONE (ROXICODONE) tablet 5 mg (5 mg Oral Given 2/17/23 1034)   diazepam (VALIUM) tablet 5 mg (5 mg Oral Given 2/17/23 1034)      Social Determinants of Health affecting care:   None    Assessments:  ED Course as of 02/17/23 1205   Fri Feb 17, 2023   1006 I obtained history and examined the patient as noted above    1020 I performed a chaperoned exam   1105 I performed I&D      Disposition:  The patient was discharged to home.     Impression & Plan    Medical Decision Making:  Susy Huerta is a 27 year old female with history of diabetes, presents today ER for evaluation of a skin problem.  See HPI.  Her vitals are unremarkable. Skin: Patient has a 1 inch area of  fluctuance without induration to her left proximal inner thigh.  It is draining some bloody purulent material.  There is no surrounding cellulitis or crepitus.  Risks and benefits of incision and drainage were discussed.  This was completed as above.  There was a good amount of purulent material that was  drained.  She has a follow-up with a surgeon already scheduled on 2/21.  I feel that she can attend this for reassessment and packing pull.  She has a few more days of doxycycline.  Ibuprofen, Tylenol, and as needed oxycodone for pain.  Side effects are discussed.  There are no significant signs of cellulitis so I do not feel extending her antibiotics is warranted.  She is to return with new or worsening symptoms.  No further questions and agrees with plan.  Discharged home with mom.  Tech present for exam and procedure today.    Diagnosis:    ICD-10-CM    1. Cutaneous abscess, unspecified site  L02.91            Discharge Medications:  Discharge Medication List as of 2/17/2023 11:40 AM      START taking these medications    Details   oxyCODONE (ROXICODONE) 5 MG tablet Take 1 tablet (5 mg) by mouth every 6 hours as needed for pain, Disp-12 tablet, R-0, Local Print              Scribe Disclosure:  I, Margret Perez, am serving as a scribe at 11:28 AM on 2/17/2023 to document services personally performed by Kendall Anderson PA-C based on my observations and the provider's statements to me.     2/17/2023   Kendall Anderson PA-C Cyr, Matthew R, PA-C  02/17/23 4358

## 2023-02-17 NOTE — DISCHARGE INSTRUCTIONS

## 2023-02-17 NOTE — ED TRIAGE NOTES
Presents to ED with cyst between perineal area and thigh on left side. States she first noticed it a few days ago. Went to doctor and was prescribed doxycycline and referred to surgeon. Pt reports pain is now worse. She has been trying warm compresses. No medications taken PTA. ABCs intact. A&OX4.      Triage Assessment     Row Name 02/17/23 0903       Triage Assessment (Adult)    Airway WDL WDL       Respiratory WDL    Respiratory WDL WDL       Skin Circulation/Temperature WDL    Skin Circulation/Temperature WDL WDL       Cardiac WDL    Cardiac WDL WDL       Peripheral/Neurovascular WDL    Peripheral Neurovascular WDL WDL       Cognitive/Neuro/Behavioral WDL    Cognitive/Neuro/Behavioral WDL WDL

## 2023-02-17 NOTE — TELEPHONE ENCOUNTER
Received call from pt and her Mom  Pt had one episode today of vomiting after she took her ABX without food    Advised to take the  ABX with food like bread or crackers and to take yogurt or a probiotic    Mom and pt verbalized understanding and agrees to the plan    Moe Alarcon RN on 2/17/2023 at 1:59 PM

## 2023-02-17 NOTE — Clinical Note
Susy Huerta was seen and treated in our emergency department on 2/17/2023.  She may return to work on 02/23/2023.       If you have any questions or concerns, please don't hesitate to call.      Kendall Anderson PA-C

## 2023-02-21 ENCOUNTER — OFFICE VISIT (OUTPATIENT)
Dept: SURGERY | Facility: CLINIC | Age: 28
End: 2023-02-21
Payer: COMMERCIAL

## 2023-02-21 VITALS
OXYGEN SATURATION: 97 % | SYSTOLIC BLOOD PRESSURE: 118 MMHG | HEART RATE: 101 BPM | DIASTOLIC BLOOD PRESSURE: 90 MMHG | HEIGHT: 70 IN | BODY MASS INDEX: 41.95 KG/M2 | WEIGHT: 293 LBS

## 2023-02-21 DIAGNOSIS — L02.419 THIGH ABSCESS: Primary | ICD-10-CM

## 2023-02-21 PROCEDURE — 99202 OFFICE O/P NEW SF 15 MIN: CPT | Performed by: SURGERY

## 2023-02-21 NOTE — PROGRESS NOTES
Assessment:    Susy Huerta is a 27 year old female seen in consultation for left thigh abscess, at the request of Jessica Frazier NP. S/P I&D in the ER 2/17/23  Now appears to be healing well without any cellulitis    Plan:  Remove new wick that was placed in the office today, tomorrow 2/22  Cover until drainage stops  Finish doxycycline  Return to clinic as needed    HPI:  Susy Huerta is a 27 year old female who presents today in consultation for an abscess on her left proximal inner thigh.   She saw PCP office 2/15 after it had been present about a week and was prescribed doxycycline but there was no improvement and she presented to the ER on 2/17 where I&D was done with purulent fluid expressed. No culture sent.  She has 2 more days of doxycycline left.  There is a hard area just near the site and it has been draining a little yellowish fluid. The packing has been in place since the I&D was done 4 days ago.    PMH:  Past Medical History:   Diagnosis Date     Menorrhagia      Obese        PSH:  Past Surgical History:   Procedure Laterality Date     DILATION AND CURETTAGE, HYSTEROSCOPY DIAGNOSTIC, COMBINED N/A 6/23/2015    Procedure: COMBINED DILATION AND CURETTAGE, HYSTEROSCOPY DIAGNOSTIC;  Surgeon: Soraida Rosado MD;  Location: [facility] OR       Allergies:  Allergies   Allergen Reactions     Amoxicillin Anaphylaxis     Other reaction(s): Edema,generalized     Sulfa Drugs Other (See Comments), Difficulty breathing and Hives     Other reaction(s): Edema,generalized  n/v     Cefprozil Hives     PN: LW Reaction: HIVES       Home Medications:  Current Outpatient Medications   Medication Sig Dispense Refill     doxycycline hyclate (VIBRA-TABS) 100 MG tablet Take 1 tablet (100 mg) by mouth 2 times daily for 7 days 14 tablet 0     metFORMIN (GLUCOPHAGE) 500 MG tablet TAKE 1 TABLET BY MOUTH TWICE DAILY WITH MEALS 180 tablet 0     sertraline (ZOLOFT) 100 MG tablet TAKE 2 TABLETS BY MOUTH DAILY. Visit  "needed prior to additional refills. 180 tablet 0     diclofenac (VOLTAREN) 75 MG EC tablet Take 1 tablet (75 mg) by mouth 2 times daily for 14 days 28 tablet 0       Social History:  Social History     Tobacco Use     Smoking status: Never     Smokeless tobacco: Never   Substance Use Topics     Alcohol use: No     Alcohol/week: 0.0 standard drinks     Drug use: No       Family History:  Family History   Problem Relation Age of Onset     Respiratory Maternal Grandmother         emphasema     Hypertension Maternal Grandmother      Other Cancer Maternal Grandmother         lung cancer     Cancer - colorectal Maternal Grandfather         Diagnosed at age 60     Hypertension Maternal Grandfather      Hypertension Mother      Other - See Comments Father         Not involved in her life     Arthritis Sister         SHAYLA     Pulmonary Embolism Sister         On Virginie, Mom thinks [facility] Leiden neg     Hypertension Maternal Uncle      Breast Cancer No family hx of          ROS:  The 10 point review of systems is negative other than noted in the HPI and above.    PE:  BP (!) 118/90   Pulse 101   Ht 1.778 m (5' 10\")   Wt (!) 169.2 kg (373 lb)   SpO2 97%   BMI 53.52 kg/m    General appearance: well-nourished, no apparent distress  Skin: there is a 2cm opening prior I&D site on the proximal left inner thigh at the groin fold. Packing in place is removed revealing a tract 3cm deep. There is bloody drainage. No surrounding cellulitis. Gauze replaced with a corner of a dry gauze as a wick.       Deedee Martinez MD    20 minutes spent on the date of the encounter doing chart review, history and exam, documentation and further activities as noted above      Please route or send letter to:  Referring Provider      "

## 2023-02-21 NOTE — PATIENT INSTRUCTIONS
Leave new dressing on until tomorrow.  Starting tomorrow, 2/22, remove all gauze. You may shower with the dressing off and let water and your normal soap run over the site. If you'd like you can soak the area in a couple inches of warm water in a clean bathtub without any soaps or bubble bath for 10-15 minutes.  After the area is clean and dry cover with one gauze pad and tape. Change this gauze as needed if it is getting soaked. Expect yellow to bloody appearing drainage for a few days or up to 1-2 weeks.   Once it stops draining you don't need to cover with gauze any longer.  Finish the antibiotics you were prescribed  Do not soak in a hot tub until area is healed over

## 2023-03-25 DIAGNOSIS — F43.23 ADJUSTMENT DISORDER WITH MIXED ANXIETY AND DEPRESSED MOOD: ICD-10-CM

## 2023-03-28 DIAGNOSIS — R73.03 PRE-DIABETES: ICD-10-CM

## 2023-03-28 RX ORDER — SERTRALINE HYDROCHLORIDE 100 MG/1
TABLET, FILM COATED ORAL
Qty: 180 TABLET | Refills: 0 | Status: SHIPPED | OUTPATIENT
Start: 2023-03-28 | End: 2023-07-05

## 2023-03-28 NOTE — TELEPHONE ENCOUNTER
Routing refill request to provider for review/approval because:  Labs out of range:  PHQ-9 at 5

## 2023-03-29 NOTE — TELEPHONE ENCOUNTER
Prescription approved per [facility] Refill Protocol.  Hank LAZAR RN 3/29/2023 at 3:30 PM

## 2023-04-16 ENCOUNTER — HEALTH MAINTENANCE LETTER (OUTPATIENT)
Age: 28
End: 2023-04-16

## 2023-05-26 ENCOUNTER — ALLIED HEALTH/NURSE VISIT (OUTPATIENT)
Dept: PEDIATRICS | Facility: CLINIC | Age: 28
End: 2023-05-26
Payer: COMMERCIAL

## 2023-05-26 ENCOUNTER — TELEPHONE (OUTPATIENT)
Dept: PEDIATRICS | Facility: CLINIC | Age: 28
End: 2023-05-26

## 2023-05-26 DIAGNOSIS — Z30.9 CONTRACEPTION MANAGEMENT: Primary | ICD-10-CM

## 2023-05-26 DIAGNOSIS — Z30.42 DEPO-PROVERA CONTRACEPTIVE STATUS: Primary | ICD-10-CM

## 2023-05-26 LAB — HCG UR QL: NEGATIVE

## 2023-05-26 PROCEDURE — 81025 URINE PREGNANCY TEST: CPT

## 2023-05-26 PROCEDURE — 99207 PR NO CHARGE NURSE ONLY: CPT

## 2023-05-26 PROCEDURE — 96372 THER/PROPH/DIAG INJ SC/IM: CPT | Performed by: INTERNAL MEDICINE

## 2023-05-26 RX ORDER — MEDROXYPROGESTERONE ACETATE 150 MG/ML
150 INJECTION, SUSPENSION INTRAMUSCULAR
Status: DISCONTINUED | OUTPATIENT
Start: 2023-05-26 | End: 2024-03-05

## 2023-05-26 RX ADMIN — MEDROXYPROGESTERONE ACETATE 150 MG: 150 INJECTION, SUSPENSION INTRAMUSCULAR at 14:10

## 2023-05-26 NOTE — TELEPHONE ENCOUNTER
Pt is on the nurse schedule for Depo today, order for depo has .  Are you willing to renew this order?  Please advise

## 2023-05-26 NOTE — PROGRESS NOTES
Clinic Administered Medication Documentation      Depo Provera Documentation    Depo-Provera Standing Order inclusion/exclusion criteria reviewed.     Is this the initial or subsequent dose of Depo Provera? Subsequent dose - patient is not within the acceptable window of time (11-15 weeks) for subsequent injection. Pregnancy test is indicated. Pregnancy test result: negative       Patient meets: inclusion criteria     Is there an active order (written within the past 365 days, with administrations remaining, not ) in the chart? No. Order was obtained from provider in clinic.     Prior to injection, verified patient identity using patient's name and date of birth. Medication was administered. Please see MAR and medication order for additional information.     Vial/Syringe: Single dose vial. Was entire vial of medication used? Yes    Patient instructed to remain in clinic for 15 minutes and report any adverse reaction to staff immediately.  NEXT INJECTION DUE: 23 - 23    Verified that the patient has refills remaining in their prescription.

## 2023-07-02 DIAGNOSIS — F43.23 ADJUSTMENT DISORDER WITH MIXED ANXIETY AND DEPRESSED MOOD: ICD-10-CM

## 2023-07-05 RX ORDER — SERTRALINE HYDROCHLORIDE 100 MG/1
TABLET, FILM COATED ORAL
Qty: 180 TABLET | Refills: 0 | Status: SHIPPED | OUTPATIENT
Start: 2023-07-05 | End: 2023-10-20

## 2023-07-05 NOTE — TELEPHONE ENCOUNTER
Refilled x1, overdue for annual visit. Needs to be scheduled with PCP or Dacia prior to additional fill.    Jessenia Pardo MD  Internal Medicine-Pediatrics

## 2023-07-05 NOTE — TELEPHONE ENCOUNTER
Routin  SSRIs Protocol Failed 07/02/2023 08:18 AM   Protocol Details  PHQ-9 score less than 5 in past 6 months   g refill request to provider for review/approval because:    Moe Alarcon RN on 7/5/2023 at 11:05 AM

## 2023-08-25 ENCOUNTER — ALLIED HEALTH/NURSE VISIT (OUTPATIENT)
Dept: PEDIATRICS | Facility: CLINIC | Age: 28
End: 2023-08-25
Payer: COMMERCIAL

## 2023-08-25 VITALS — DIASTOLIC BLOOD PRESSURE: 70 MMHG | SYSTOLIC BLOOD PRESSURE: 138 MMHG

## 2023-08-25 DIAGNOSIS — Z78.9 USES BIRTH CONTROL: Primary | ICD-10-CM

## 2023-08-25 PROCEDURE — 99207 PR NO CHARGE NURSE ONLY: CPT

## 2023-08-25 PROCEDURE — 96372 THER/PROPH/DIAG INJ SC/IM: CPT | Performed by: INTERNAL MEDICINE

## 2023-08-25 RX ADMIN — MEDROXYPROGESTERONE ACETATE 150 MG: 150 INJECTION, SUSPENSION INTRAMUSCULAR at 10:16

## 2023-09-17 ENCOUNTER — HEALTH MAINTENANCE LETTER (OUTPATIENT)
Age: 28
End: 2023-09-17

## 2023-10-19 DIAGNOSIS — F43.23 ADJUSTMENT DISORDER WITH MIXED ANXIETY AND DEPRESSED MOOD: ICD-10-CM

## 2023-10-20 RX ORDER — SERTRALINE HYDROCHLORIDE 100 MG/1
TABLET, FILM COATED ORAL
Qty: 180 TABLET | Refills: 0 | Status: SHIPPED | OUTPATIENT
Start: 2023-10-20 | End: 2024-02-07

## 2023-10-20 NOTE — TELEPHONE ENCOUNTER
Pt's mom Jessi calling ([facility] on file). She is requesting refill of pt's sertraline. Informed rx was already sent to pharmacy for pt today (see rx below). Patient's mom was given an opportunity to ask questions, verbalized understanding of plan, and is agreeable.         Disp Refills Start End LAWRENCE   sertraline (ZOLOFT) 100 MG tablet 180 tablet 0 10/20/2023  No   Sig: TAKE 2 TABLETS BY MOUTH DAILY. Visit needed prior to additional refills.   Sent to pharmacy as: Sertraline HCl 100 MG Oral Tablet (ZOLOFT)   Class: E-Prescribe   Earliest Fill Date: 10/20/2023   Order: [id number]   E-Prescribing Status: Receipt confirmed by pharmacy (10/20/2023  8:53 AM CDT)       Willow CABRAL RN

## 2023-10-30 ENCOUNTER — IMMUNIZATION (OUTPATIENT)
Dept: PEDIATRICS | Facility: CLINIC | Age: 28
End: 2023-10-30
Payer: COMMERCIAL

## 2023-10-30 DIAGNOSIS — Z23 NEED FOR PROPHYLACTIC VACCINATION AND INOCULATION AGAINST INFLUENZA: Primary | ICD-10-CM

## 2023-10-30 PROCEDURE — 90686 IIV4 VACC NO PRSV 0.5 ML IM: CPT

## 2023-10-30 PROCEDURE — 90471 IMMUNIZATION ADMIN: CPT

## 2023-10-30 PROCEDURE — 99207 PR NO CHARGE NURSE ONLY: CPT

## 2023-11-20 ENCOUNTER — ALLIED HEALTH/NURSE VISIT (OUTPATIENT)
Dept: PEDIATRICS | Facility: CLINIC | Age: 28
End: 2023-11-20
Payer: COMMERCIAL

## 2023-11-20 DIAGNOSIS — Z30.42 DEPO-PROVERA CONTRACEPTIVE STATUS: Primary | ICD-10-CM

## 2023-11-20 PROCEDURE — 99207 PR NO CHARGE NURSE ONLY: CPT

## 2023-11-20 PROCEDURE — 96372 THER/PROPH/DIAG INJ SC/IM: CPT | Performed by: INTERNAL MEDICINE

## 2023-11-20 RX ADMIN — MEDROXYPROGESTERONE ACETATE 150 MG: 150 INJECTION, SUSPENSION INTRAMUSCULAR at 10:08

## 2023-11-20 NOTE — PROGRESS NOTES
Clinic Administered Medication Documentation          Depo Provera Documentation    Depo-Provera Standing Order inclusion/exclusion criteria reviewed.     Is this the initial or subsequent dose of Depo Provera? Subsequent dose - patient is within the acceptable window of time (11-15 weeks) for subsequent injection. Pregnancy test not indicated.    Patient meets: inclusion criteria     Is there an active order (written within the past 365 days, with administrations remaining, not ) in the chart? Yes.     Prior to injection, verified patient identity using patient's name and date of birth. Medication was administered. Please see MAR and medication order for additional information.     Vial/Syringe: Single dose vial. Was entire vial of medication used? Yes    Patient instructed to remain in clinic for 15 minutes and report any adverse reaction to staff immediately.  NEXT INJECTION DUE: 24 - 3/4/24    Verified that the patient has refills remaining in their prescription.

## 2024-01-13 ENCOUNTER — OFFICE VISIT (OUTPATIENT)
Dept: URGENT CARE | Facility: URGENT CARE | Age: 29
End: 2024-01-13
Payer: COMMERCIAL

## 2024-01-13 VITALS
HEART RATE: 110 BPM | SYSTOLIC BLOOD PRESSURE: 154 MMHG | TEMPERATURE: 98.9 F | OXYGEN SATURATION: 97 % | DIASTOLIC BLOOD PRESSURE: 102 MMHG | RESPIRATION RATE: 12 BRPM

## 2024-01-13 DIAGNOSIS — R07.0 THROAT PAIN: ICD-10-CM

## 2024-01-13 DIAGNOSIS — J06.9 VIRAL URI WITH COUGH: Primary | ICD-10-CM

## 2024-01-13 DIAGNOSIS — R05.1 ACUTE COUGH: ICD-10-CM

## 2024-01-13 DIAGNOSIS — R51.9 GENERALIZED HEADACHES: ICD-10-CM

## 2024-01-13 DIAGNOSIS — J34.89 STUFFY AND RUNNY NOSE: ICD-10-CM

## 2024-01-13 DIAGNOSIS — R52 GENERALIZED BODY ACHES: ICD-10-CM

## 2024-01-13 LAB — DEPRECATED S PYO AG THROAT QL EIA: NEGATIVE

## 2024-01-13 PROCEDURE — 99213 OFFICE O/P EST LOW 20 MIN: CPT | Performed by: PHYSICIAN ASSISTANT

## 2024-01-13 PROCEDURE — 87635 SARS-COV-2 COVID-19 AMP PRB: CPT | Performed by: PHYSICIAN ASSISTANT

## 2024-01-13 PROCEDURE — 87651 STREP A DNA AMP PROBE: CPT | Performed by: PHYSICIAN ASSISTANT

## 2024-01-13 NOTE — PATIENT INSTRUCTIONS
January 13, 2024Urgent Care Plan         -Home supportive care   -Ok to alternate over the counter Ibuprofen 400 mg and Tylenol 650 mg (switch from one to the other every 4 hours) for the next couple of days, as needed for sore throat and fever  -Encourage extra fluids   -Follow-up with primary care or urgent care if not all better by day 10 to 14 of illness symptoms. -Follow-up sooner if any sudden, severe worsening or your symptoms, and/or if new illness symptoms develop.   -Watch your MyChart for your Strep PCR test result (typically comes back in 4-24 hours)  -Watch your MyChart for your Covid-19 PCR test result (typically comes back in 1-2 days)

## 2024-01-13 NOTE — PROGRESS NOTES
ASSESSMENT/PLAN:    (J06.9) Viral URI with cough  (primary encounter diagnosis)    MDM: Acute onset upper respiratory symptoms consistent with viral URI. Rapid Strep is negative. Strep and Covid PCR test results pending. No evidence of secondary bacterial infection on exam. No evidence of respiratory distress or other medical distress requiring emergent intervention at this time.    Plan:       January 13, 2024Urgent Care Plan         -Home supportive care   -Ok to alternate over the counter Ibuprofen 400 mg and Tylenol 650 mg (switch from one to the other every 4 hours) for the next couple of days, as needed for sore throat and fever  -Encourage extra fluids   -Follow-up with primary care or urgent care if not all better by day 10 to 14 of illness symptoms. -Follow-up sooner if any sudden, severe worsening or your symptoms, and/or if new illness symptoms develop.   -Watch your MyChart for your Strep PCR test result (typically comes back in 12-24 hours)  -Watch your MyChart for your Covid-19 PCR test result (typically comes back in 1-2 days)      (R07.0) Throat pain  Plan: Streptococcus A Rapid Screen w/Reflex to PCR -         Clinic Collect, Symptomatic COVID-19 Virus         (Coronavirus) by PCR Nose, Group A         Streptococcus PCR Throat Swab      (R05.1) Acute cough    (J34.89) Stuffy and runny nose    (R52) Generalized body aches      (R51.9) Generalized headaches      This progress note has been dictated, with use of voice recognition software. Any grammatical, typographical, or context errors are unintentional and inherent to use of voice recognition software.  --------------------------      Chief Complaint   Patient presents with    Pharyngitis     5 days, nasal congestion, sinus pressure, cough         SUBJECTIVE:     Susy Huerta is a 28 year old female who presents to , accompanied by her mother, for evaluation of acute onset nasal/sinus congestion, sore throat, runny nose, cough, and  subjective/tactile fever 5 days duration. x     Associated Symptoms: Generalized waxing and waning body aches and generalized waxing and waning headaches.    RESPIRATORY HISTORY: No prior history of hospitalization for respiratory distress as an adult.  No known asthma, reactive disease or other respiratory history.         ROS:   CONSITUTIONAL: Positive for subjective/tactile fever.  Patient has not measured her temperature.  Positive malaise.   HEENT: Positive as per above.  Patient confirms she is able to drink liquids and take in soft foods despite current illness symptoms  RESP: Positive as per above.  No hemoptysis.  No wheezing.  No shortness of breath.  CARDIAC: No acute onset chest pain, or shortness of breath  GI:  No abdominal pain. No acute N/V/D  SKIN: No acute systemic rash or hives    NEURO: Positive for generalized waxing and waning headache as per above with interval improvement. No severe neck stiffness, photophobia, rash, mental status changes or lethargy.     Past Medical History:   Diagnosis Date    Menorrhagia     Obese        Patient Active Problem List   Diagnosis    Allergic rhinitis    Obesity    Adjustment disorder with mixed anxiety and depressed mood    Essential hypertension    Family history of DVT    Major depressive disorder, recurrent episode, mild (H24)    Eczema, unspecified eczema    BMI 50.0-59.9, adult (H)    Pre-diabetes    Contraception    External hemorrhoid     Current Outpatient Medications   Medication    metFORMIN (GLUCOPHAGE) 500 MG tablet    sertraline (ZOLOFT) 100 MG tablet     Current Facility-Administered Medications   Medication    medroxyPROGESTERone (DEPO-PROVERA) injection 150 mg    medroxyPROGESTERone (DEPO-PROVERA) injection 150 mg    triamcinolone acetonide (KENALOG-10) injection 10 mg           Allergies   Allergen Reactions    Amoxicillin Anaphylaxis     Other reaction(s): Edema,generalized    Sulfa Antibiotics Other (See Comments), Difficulty breathing and  Hives     Other reaction(s): Edema,generalized  n/v    Cefprozil Hives     PN: LW Reaction: HIVES         OBJECTIVE:  BP (!) 154/102   Pulse 110   Temp 98.9  F (37.2  C)   Resp 12   SpO2 97%           General appearance: alert and no apparent distress  Skin color is uniform in color and without rash, hives or vessicles   HEENT:   Conjunctiva not injected.  Sclera clear.  Left TM is normal: no effusions, no erythema, and normal landmarks.  Right TM is normal: no effusions, no erythema, and normal landmarks.  Nasal mucosa is normal.  Oropharyngeal exam is positive for mild, generalized, posterior pharyngeal erythema.  No asymmetry. Uvula is midline. No trismus. Voice is clear. No lesions, adenopathy, plaque or exudate.  Neck is supple, FROM. No neck stiffness. No adenopathy  CARDIAC:NORMAL - regular rate and rhythm without murmur.  RESP: No increased work of breathing.  Lung fields are clear to ausculation. No rales, rhonchi, or wheezing.  NEURO:Alert and oriented.  Normal speech and mentation.  CN II/XII grossly intact.  Gait within normal limits.          LAB:      Results for orders placed or performed in visit on 01/13/24   Streptococcus A Rapid Screen w/Reflex to PCR - Clinic Collect     Status: Normal    Specimen: Throat; Swab   Result Value Ref Range    Group A Strep antigen Negative Negative     Strep PCR is pending.    COVID PCR is pending.

## 2024-01-14 LAB — GROUP A STREP BY PCR: NOT DETECTED

## 2024-01-15 ENCOUNTER — NURSE TRIAGE (OUTPATIENT)
Dept: PEDIATRICS | Facility: CLINIC | Age: 29
End: 2024-01-15

## 2024-01-15 LAB — SARS-COV-2 RNA RESP QL NAA+PROBE: POSITIVE

## 2024-01-16 NOTE — TELEPHONE ENCOUNTER
Attempted to reach patient via phone, VM not set up yet. Sent patient a my chart message to see how she is doing today.    Waiting for response.     DARION Castanon on 1/16/2024 at 3:10 PM

## 2024-01-16 NOTE — TELEPHONE ENCOUNTER
Called patient back. Patient reports she is a bit stuffy- nasal. Has a decongestant [employer] brand.     Clarified that the SOB is just from being stuffy in her nasal passages. She is not experiencing issues with her lungs.  No chest pain. Patient was advised to continue with home care measures- OTC meds, sleeping vertically, increased fluids.     Leaving open to follow up with patient in the morning.    DARION Castanon on 1/15/2024 at 6:19 PM

## 2024-02-04 ENCOUNTER — HEALTH MAINTENANCE LETTER (OUTPATIENT)
Age: 29
End: 2024-02-04

## 2024-02-07 DIAGNOSIS — F43.23 ADJUSTMENT DISORDER WITH MIXED ANXIETY AND DEPRESSED MOOD: ICD-10-CM

## 2024-02-07 DIAGNOSIS — R73.03 PRE-DIABETES: ICD-10-CM

## 2024-02-07 RX ORDER — SERTRALINE HYDROCHLORIDE 100 MG/1
TABLET, FILM COATED ORAL
Qty: 60 TABLET | Refills: 0 | Status: SHIPPED | OUTPATIENT
Start: 2024-02-07 | End: 2024-03-05

## 2024-02-07 NOTE — TELEPHONE ENCOUNTER
Note was added to last rx to schedule a visit. No visit scheduled with me.  Rx sent for 30 days again with a note to schedule.

## 2024-02-14 NOTE — PROGRESS NOTES
Susy is a 28 year old  female who presents for annual exam.     Besides routine health maintenance,  she would like to discuss abnormal bleeding.  HPI: Susy has been on Depo for many years. She went off of it for a year and never had a period. Back on Depo now and has had periods lasting 1-2 months with only 4 periods since July. Has noted some decline in her mood and we increased her dosage by 50 mg of Zoloft. Will plan a virtual visit if this is not helping to improve mood as this CNM will be leaving the [facility].   Menses are irregular lasting 1-2 months since 2023 has had about 4 periods. Was off Depo for a year and had no period   Menses flow: Lighter and changing color and clots.    Patient's last menstrual period was 2023..   Using depo or other injectable for contraception.    She is not currently considering pregnancy.    REPRODUCTIVE/GYNECOLOGIC HISTORY:  Susy is sexually active with male partner(s) and is currently in monogamous relationship.   STI testing offered?  Declined  History of abnormal Pap smear:  No  SOCIAL HISTORY  Abuse: does not report having previously been physical or sexually abused.    Do you feel safe in your environment? YES       She  reports that she has never smoked. She has never used smokeless tobacco.      Last PHQ-9 score on record =       2/15/2024     1:52 PM   PHQ-9 SCORE   PHQ-9 Total Score 0     Last GAD7 score on record =       2/15/2024     1:52 PM   RAMIRO-7 SCORE   Total Score 6     Alcohol Score = 1 x month     HEALTH MAINTENANCE:                    MAR 19   2023 A1C (Every 3 Months)  Last completed: Dec 19, 2022     AUG 15   2023 PHQ-9 (Every 6 Months)  Last completed: Feb 15, 2023     SEP 1   2023 COVID-19 Vaccine ( season)  Last completed: 2022        HEALTHY HABITS  Eating habits: eats regular meals and follows a balanced nutrition diet  Calcium/Vitamin D intake: source:  dairy Adequate?   Exercise: How often do you  exercise? Daily;Walking  Have you had an eye exam in the last two years? YES     Do you routinely see the dentist once or twice yearly? YES        HISTORY:  OB History    Para Term  AB Living   0 0 0 0 0 0   SAB IAB Ectopic Multiple Live Births   0 0 0 0 0     Past Medical History:   Diagnosis Date    Menorrhagia     Obese      Past Surgical History:   Procedure Laterality Date    DILATION AND CURETTAGE, HYSTEROSCOPY DIAGNOSTIC, COMBINED N/A 2015    Procedure: COMBINED DILATION AND CURETTAGE, HYSTEROSCOPY DIAGNOSTIC;  Surgeon: Soraida Rosado MD;  Location: [facility] OR     Family History   Problem Relation Age of Onset    Respiratory Maternal Grandmother         emphasema    Hypertension Maternal Grandmother     Other Cancer Maternal Grandmother         lung cancer    Cancer - colorectal Maternal Grandfather         Diagnosed at age 60    Hypertension Maternal Grandfather     Hypertension Mother     Other - See Comments Father         Not involved in her life    Arthritis Sister         SHAYLA    Pulmonary Embolism Sister         On Virginie, Mom thinks [facility] Leiden neg    Hypertension Maternal Uncle     Breast Cancer No family hx of      Social History     Socioeconomic History    Marital status: Single   Tobacco Use    Smoking status: Never    Smokeless tobacco: Never   Substance and Sexual Activity    Alcohol use: No     Alcohol/week: 0.0 standard drinks of alcohol    Drug use: No    Sexual activity: Never   Other Topics Concern    Parent/sibling w/ CABG, MI or angioplasty before 65F 55M? No   Social History Narrative    moved from [place], [place] ; lives with mom and 2 sisters    flys, with mom, back to [place] to visit father 1-3x/month.  Doesn't like flying.        -------        2018    [employer]    Just moved with Mom, both sisters are gone.        Enjoy reading (adventure)        Stays active by walking on trails, doing errands.        Current Outpatient Medications:     metFORMIN  "(GLUCOPHAGE) 500 MG tablet, TAKE ONE TABLET BY MOUTH TWICE DAILY WITH MEALS. Office visit needed prior to additional refills., Disp: 60 tablet, Rfl: 0    sertraline (ZOLOFT) 100 MG tablet, TAKE 2 TABLETS BY MOUTH  ONCE DAILY. Visit needed prior to additional refills., Disp: 60 tablet, Rfl: 0    Current Facility-Administered Medications:     medroxyPROGESTERone (DEPO-PROVERA) injection 150 mg, 150 mg, Intramuscular, Q90 Days, Yohan Beltran MD, 150 mg at 11/20/23 1008    medroxyPROGESTERone (DEPO-PROVERA) injection 150 mg, 150 mg, Intramuscular, Q90 Days, Nathaly Mcclellan PA-C, 150 mg at 09/22/22 1553    triamcinolone acetonide (KENALOG-10) injection 10 mg, 10 mg, , , Jordy Chester, JANAY, 10 mg at 09/22/22 1604     Allergies   Allergen Reactions    Amoxicillin Anaphylaxis     Other reaction(s): Edema,generalized    Sulfa Antibiotics Other (See Comments), Difficulty breathing and Hives     Other reaction(s): Edema,generalized  n/v    Cefprozil Hives     PN: LW Reaction: HIVES       Past medical, surgical, social and family history were reviewed and updated in EPIC.    ROS:   12 point review of systems negative other than symptoms noted below or in the HPI.    PHYSICAL EXAM:  /86   Ht 1.778 m (5' 10\")   Wt (!) 170.6 kg (376 lb)   LMP 12/23/2023   BMI 53.95 kg/m     BMI: Body mass index is 53.95 kg/m .  Constitutional: healthy, alert and no distress  Skin: Multiple open sores on the skin. Susy reports she picks at her skin.   Neck: symmetrical, thyroid normal size, no masses present, no lymphadenopathy present.   Cardiovascular: RRR, no murmurs present  Respiratory: breathing unlabored, lungs CTA bilaterally  Breast:normal without masses, tenderness or nipple discharge and no palpable axillary masses or adenopathy  Gastrointestinal: abdomen soft, non-tender, bowel sounds present  PELVIC EXAM:  Vulva: No lesions, no adenopathy, BUS WNL  Vagina: Moist, pink, discharge normal  well rugated, no " lesions  Cervix:smooth, pink, no visible lesions. Pap smear collected  Uterus: Normal size, non-tender, mobile  Ovaries: No masses palpated  Rectal exam: deferred    ASSESSMENT/PLAN:    ICD-10-CM    1. Well woman exam  Z01.419       2. Major depressive disorder, recurrent episode, mild (H24)  F33.0       3. BMI 50.0-59.9, adult (H)  Z68.43       4. Routine Papanicolaou smear  Z12.4         No results found for any visits on 02/15/24.      COUNSELING:   Reviewed preventive health counseling, as reflected in patient instructions       Regular exercise       Healthy diet/nutrition       Vision screening       Alcohol Use       Contraception  Offered weight management referral for BMI and declined at this time.     Marcela Lopez CNM

## 2024-02-15 ENCOUNTER — OFFICE VISIT (OUTPATIENT)
Dept: MIDWIFE SERVICES | Facility: CLINIC | Age: 29
End: 2024-02-15
Payer: COMMERCIAL

## 2024-02-15 VITALS
SYSTOLIC BLOOD PRESSURE: 138 MMHG | BODY MASS INDEX: 41.95 KG/M2 | HEIGHT: 70 IN | DIASTOLIC BLOOD PRESSURE: 86 MMHG | WEIGHT: 293 LBS

## 2024-02-15 DIAGNOSIS — F33.0 MAJOR DEPRESSIVE DISORDER, RECURRENT EPISODE, MILD (H): ICD-10-CM

## 2024-02-15 DIAGNOSIS — N93.9 ABNORMAL VAGINAL BLEEDING: ICD-10-CM

## 2024-02-15 DIAGNOSIS — Z12.4 ROUTINE PAPANICOLAOU SMEAR: ICD-10-CM

## 2024-02-15 DIAGNOSIS — Z01.419 WELL WOMAN EXAM: Primary | ICD-10-CM

## 2024-02-15 DIAGNOSIS — Z30.42 ENCOUNTER FOR SURVEILLANCE OF INJECTABLE CONTRACEPTIVE: ICD-10-CM

## 2024-02-15 DIAGNOSIS — F41.9 ANXIETY: ICD-10-CM

## 2024-02-15 LAB
ERYTHROCYTE [DISTWIDTH] IN BLOOD BY AUTOMATED COUNT: 16.1 % (ref 10–15)
HBA1C MFR BLD: 5.5 % (ref 0–5.6)
HCG UR QL: NEGATIVE
HCT VFR BLD AUTO: 37.4 % (ref 35–47)
HGB BLD-MCNC: 12 G/DL (ref 11.7–15.7)
INTERNAL QC OK POCT: NORMAL
MCH RBC QN AUTO: 25.2 PG (ref 26.5–33)
MCHC RBC AUTO-ENTMCNC: 32.1 G/DL (ref 31.5–36.5)
MCV RBC AUTO: 78 FL (ref 78–100)
PLATELET # BLD AUTO: 393 10E3/UL (ref 150–450)
POCT KIT EXPIRATION DATE: NORMAL
POCT KIT LOT NUMBER: NORMAL
RBC # BLD AUTO: 4.77 10E6/UL (ref 3.8–5.2)
WBC # BLD AUTO: 12.5 10E3/UL (ref 4–11)

## 2024-02-15 PROCEDURE — 81025 URINE PREGNANCY TEST: CPT | Performed by: ADVANCED PRACTICE MIDWIFE

## 2024-02-15 PROCEDURE — 96372 THER/PROPH/DIAG INJ SC/IM: CPT | Performed by: ADVANCED PRACTICE MIDWIFE

## 2024-02-15 PROCEDURE — 83036 HEMOGLOBIN GLYCOSYLATED A1C: CPT | Performed by: ADVANCED PRACTICE MIDWIFE

## 2024-02-15 PROCEDURE — G0145 SCR C/V CYTO,THINLAYER,RESCR: HCPCS | Performed by: ADVANCED PRACTICE MIDWIFE

## 2024-02-15 PROCEDURE — 85027 COMPLETE CBC AUTOMATED: CPT | Performed by: ADVANCED PRACTICE MIDWIFE

## 2024-02-15 PROCEDURE — 36415 COLL VENOUS BLD VENIPUNCTURE: CPT | Performed by: ADVANCED PRACTICE MIDWIFE

## 2024-02-15 PROCEDURE — 99385 PREV VISIT NEW AGE 18-39: CPT | Mod: 25 | Performed by: ADVANCED PRACTICE MIDWIFE

## 2024-02-15 PROCEDURE — 99213 OFFICE O/P EST LOW 20 MIN: CPT | Mod: 25 | Performed by: ADVANCED PRACTICE MIDWIFE

## 2024-02-15 RX ORDER — MEDROXYPROGESTERONE ACETATE 150 MG/ML
150 INJECTION, SUSPENSION INTRAMUSCULAR
Status: ACTIVE | OUTPATIENT
Start: 2024-02-15 | End: 2025-02-09

## 2024-02-15 RX ADMIN — MEDROXYPROGESTERONE ACETATE 150 MG: 150 INJECTION, SUSPENSION INTRAMUSCULAR at 15:03

## 2024-02-15 ASSESSMENT — ANXIETY QUESTIONNAIRES
GAD7 TOTAL SCORE: 6
6. BECOMING EASILY ANNOYED OR IRRITABLE: SEVERAL DAYS
1. FEELING NERVOUS, ANXIOUS, OR ON EDGE: SEVERAL DAYS
2. NOT BEING ABLE TO STOP OR CONTROL WORRYING: SEVERAL DAYS
GAD7 TOTAL SCORE: 6
7. FEELING AFRAID AS IF SOMETHING AWFUL MIGHT HAPPEN: NOT AT ALL
5. BEING SO RESTLESS THAT IT IS HARD TO SIT STILL: SEVERAL DAYS
3. WORRYING TOO MUCH ABOUT DIFFERENT THINGS: SEVERAL DAYS
IF YOU CHECKED OFF ANY PROBLEMS ON THIS QUESTIONNAIRE, HOW DIFFICULT HAVE THESE PROBLEMS MADE IT FOR YOU TO DO YOUR WORK, TAKE CARE OF THINGS AT HOME, OR GET ALONG WITH OTHER PEOPLE: SOMEWHAT DIFFICULT

## 2024-02-15 ASSESSMENT — PATIENT HEALTH QUESTIONNAIRE - PHQ9
SUM OF ALL RESPONSES TO PHQ QUESTIONS 1-9: 0
5. POOR APPETITE OR OVEREATING: SEVERAL DAYS

## 2024-02-15 NOTE — NURSING NOTE
"Chief Complaint   Patient presents with    Physical       Initial LMP 2023  Estimated body mass index is 53.52 kg/m  as calculated from the following:    Height as of 23: 1.778 m (5' 10\").    Weight as of 23: 169.2 kg (373 lb).  BP completed using cuff size: X-large    Questioned patient about current smoking habits.  Pt. has never smoked.          The following HM Due: pap smear      Discuss Heavy abnormal bleeding, started 23 and ended 24  Also due for Depo Provera injection    Clinic Administered Medication Documentation        Patient was given Depo Provera. Prior to medication administration, verified patient's identity using patient s name and date of birth. Please see MAR and medication order for additional information. Patient instructed to remain in clinic for 15 minutes and report any adverse reaction to staff immediately.    Vial/Syringe: Single dose vial. Was entire vial of medication used? Yes    NEXT INJECTION DUE: 24 - 24    Yaz Oliver CMA on 2/15/2024 at 3:04 PM      "

## 2024-02-20 LAB
BKR LAB AP GYN ADEQUACY: NORMAL
BKR LAB AP GYN INTERPRETATION: NORMAL
BKR LAB AP HPV REFLEX: NORMAL
BKR LAB AP PREVIOUS ABNORMAL: NORMAL
PATH REPORT.COMMENTS IMP SPEC: NORMAL
PATH REPORT.COMMENTS IMP SPEC: NORMAL
PATH REPORT.RELEVANT HX SPEC: NORMAL

## 2024-03-05 ENCOUNTER — VIRTUAL VISIT (OUTPATIENT)
Dept: PEDIATRICS | Facility: CLINIC | Age: 29
End: 2024-03-05
Payer: COMMERCIAL

## 2024-03-05 ENCOUNTER — ANCILLARY PROCEDURE (OUTPATIENT)
Dept: ULTRASOUND IMAGING | Facility: CLINIC | Age: 29
End: 2024-03-05
Attending: ADVANCED PRACTICE MIDWIFE
Payer: COMMERCIAL

## 2024-03-05 DIAGNOSIS — R73.03 PRE-DIABETES: ICD-10-CM

## 2024-03-05 DIAGNOSIS — F43.23 ADJUSTMENT DISORDER WITH MIXED ANXIETY AND DEPRESSED MOOD: ICD-10-CM

## 2024-03-05 DIAGNOSIS — N93.9 ABNORMAL VAGINAL BLEEDING: ICD-10-CM

## 2024-03-05 PROCEDURE — 99441 PR PHYSICIAN TELEPHONE EVALUATION 5-10 MIN: CPT | Performed by: INTERNAL MEDICINE

## 2024-03-05 PROCEDURE — 76856 US EXAM PELVIC COMPLETE: CPT | Performed by: OBSTETRICS & GYNECOLOGY

## 2024-03-05 PROCEDURE — 76830 TRANSVAGINAL US NON-OB: CPT | Performed by: OBSTETRICS & GYNECOLOGY

## 2024-03-05 RX ORDER — METFORMIN HCL 500 MG
1000 TABLET, EXTENDED RELEASE 24 HR ORAL DAILY
Qty: 180 TABLET | Refills: 3 | Status: SHIPPED | OUTPATIENT
Start: 2024-03-05

## 2024-03-05 RX ORDER — SERTRALINE HYDROCHLORIDE 100 MG/1
TABLET, FILM COATED ORAL
Qty: 180 TABLET | Refills: 3 | Status: SHIPPED | OUTPATIENT
Start: 2024-03-05

## 2024-03-05 NOTE — PROGRESS NOTES
Susy is a 28 year old who is being evaluated via a billable telephone visit.      Distant Location (provider location):  On-site    Assessment & Plan       ICD-10-CM    1. Pre-diabetes  R73.03 metFORMIN (GLUCOPHAGE XR) 500 MG 24 hr tablet      2. Adjustment disorder with mixed anxiety and depressed mood  F43.23 sertraline (ZOLOFT) 100 MG tablet        No changes made w/ her medications today.  A1C is back into the normal range.   Continue w/ sertraline 250 mg daily for now. If her mood slips, recommend adding a second medication rather than a further increase in sertraline dosing.     Yohan Beltran MD         Subjective   Susy is a 28 year old, presenting for the following health issues:  Recheck Medication    HPI         5/3/2021    10:34 AM 2/15/2023     2:38 PM 2/15/2024     1:52 PM   PHQ   PHQ-9 Total Score 2 5 0   Q9: Thoughts of better off dead/self-harm past 2 weeks Not at all Not at all Not at all         5/15/2019    11:42 AM 5/3/2021    10:34 AM 2/15/2024     1:52 PM   RAMIRO-7 SCORE   Total Score 2 1 6       Saw midwife 3 weeks ago. Added 50 mg sertraline. Now 250 mg once per day.  Feels this has been helpful for her mood.   Would like to continue w/ this dose.  Reviewed typical max 200 mg daily, but can be dosed up to 300 mg daily.     Hx of pre-diabetes. Has been taking metformin for several years. Currently 500 mg BID.  Had labs last month:  Lab Results   Component Value Date    A1C 5.5 02/15/2024    A1C 5.5 12/19/2022    A1C 5.6 05/28/2021    A1C 5.7 11/16/2018      Reviewed the option of changing to XR tablets so can be dosed once per day.           Objective           Vitals:  No vitals were obtained today due to virtual visit.    Physical Exam   GEN: No distress  PSYCH: Alert, affect is normal  RESP: No cough, no audible wheezing, able to talk in full sentences  Remainder of exam unable to be completed due to telephone visit         Phone call duration: 6 minutes  Signed Electronically by: Yohan LEUNG  MD Devin

## 2024-03-17 DIAGNOSIS — R73.03 PRE-DIABETES: ICD-10-CM

## 2024-05-07 ENCOUNTER — ALLIED HEALTH/NURSE VISIT (OUTPATIENT)
Dept: OBGYN | Facility: CLINIC | Age: 29
End: 2024-05-07
Payer: COMMERCIAL

## 2024-05-07 VITALS — BODY MASS INDEX: 55.24 KG/M2 | DIASTOLIC BLOOD PRESSURE: 78 MMHG | SYSTOLIC BLOOD PRESSURE: 118 MMHG | WEIGHT: 293 LBS

## 2024-05-07 DIAGNOSIS — Z78.9 USES BIRTH CONTROL: Primary | ICD-10-CM

## 2024-05-07 PROCEDURE — 99207 PR NO CHARGE NURSE ONLY: CPT

## 2024-05-07 PROCEDURE — 96372 THER/PROPH/DIAG INJ SC/IM: CPT | Performed by: ADVANCED PRACTICE MIDWIFE

## 2024-05-07 RX ADMIN — MEDROXYPROGESTERONE ACETATE 150 MG: 150 INJECTION, SUSPENSION INTRAMUSCULAR at 08:55

## 2024-06-22 ENCOUNTER — HEALTH MAINTENANCE LETTER (OUTPATIENT)
Age: 29
End: 2024-06-22

## 2024-07-23 ENCOUNTER — ALLIED HEALTH/NURSE VISIT (OUTPATIENT)
Dept: OBGYN | Facility: CLINIC | Age: 29
End: 2024-07-23
Payer: COMMERCIAL

## 2024-07-23 DIAGNOSIS — Z78.9 USES BIRTH CONTROL: Primary | ICD-10-CM

## 2024-07-23 PROCEDURE — 96372 THER/PROPH/DIAG INJ SC/IM: CPT | Performed by: ADVANCED PRACTICE MIDWIFE

## 2024-07-23 PROCEDURE — 99207 PR NO CHARGE NURSE ONLY: CPT

## 2024-07-23 RX ADMIN — MEDROXYPROGESTERONE ACETATE 150 MG: 150 INJECTION, SUSPENSION INTRAMUSCULAR at 09:37

## 2024-07-23 NOTE — NURSING NOTE
Clinic Administered Medication Documentation      Depo Provera Documentation    Depo-Provera Standing Order inclusion/exclusion criteria reviewed.     Is this the initial or subsequent dose of Depo Provera? Subsequent dose - patient is within the acceptable window of time (11-15 weeks) for subsequent injection. Pregnancy test not indicated.    Patient meets: inclusion criteria     Is there an active order (written within the past 365 days, with administrations remaining, not ) in the chart? Yes.     Prior to injection, verified patient identity using patient's name and date of birth. Medication was administered. Please see MAR and medication order for additional information.     Vial/Syringe: Single dose vial. Was entire vial of medication used? Yes    Patient instructed to remain in clinic for 15 minutes and report any adverse reaction to staff immediately.  NEXT INJECTION DUE: 10/8/24 - 10/22/24  Site: Right Deltoid  Verified that the patient has refills remaining in their prescription.    Yaz Oliver CMA on 2024 at 9:19 AM

## 2024-10-15 ENCOUNTER — ALLIED HEALTH/NURSE VISIT (OUTPATIENT)
Dept: PEDIATRICS | Facility: CLINIC | Age: 29
End: 2024-10-15
Payer: COMMERCIAL

## 2024-10-15 VITALS — TEMPERATURE: 98.2 F

## 2024-10-15 DIAGNOSIS — Z23 NEED FOR VACCINATION: Primary | ICD-10-CM

## 2024-10-15 PROCEDURE — 90656 IIV3 VACC NO PRSV 0.5 ML IM: CPT

## 2024-10-15 PROCEDURE — 99207 PR NO CHARGE NURSE ONLY: CPT

## 2024-10-15 PROCEDURE — 90471 IMMUNIZATION ADMIN: CPT

## 2024-11-09 ENCOUNTER — HEALTH MAINTENANCE LETTER (OUTPATIENT)
Age: 29
End: 2024-11-09

## 2024-11-12 ENCOUNTER — TELEPHONE (OUTPATIENT)
Dept: MIDWIFE SERVICES | Facility: CLINIC | Age: 29
End: 2024-11-12

## 2024-11-12 ENCOUNTER — OFFICE VISIT (OUTPATIENT)
Dept: PEDIATRICS | Facility: CLINIC | Age: 29
End: 2024-11-12
Payer: COMMERCIAL

## 2024-11-12 VITALS
RESPIRATION RATE: 20 BRPM | HEIGHT: 70 IN | BODY MASS INDEX: 41.95 KG/M2 | OXYGEN SATURATION: 97 % | SYSTOLIC BLOOD PRESSURE: 129 MMHG | HEART RATE: 95 BPM | WEIGHT: 293 LBS | TEMPERATURE: 97.3 F | DIASTOLIC BLOOD PRESSURE: 89 MMHG

## 2024-11-12 DIAGNOSIS — I10 ESSENTIAL HYPERTENSION: ICD-10-CM

## 2024-11-12 DIAGNOSIS — Z30.42 ENCOUNTER FOR SURVEILLANCE OF INJECTABLE CONTRACEPTIVE: Primary | ICD-10-CM

## 2024-11-12 DIAGNOSIS — F33.0 MAJOR DEPRESSIVE DISORDER, RECURRENT EPISODE, MILD (H): Primary | ICD-10-CM

## 2024-11-12 DIAGNOSIS — R73.03 PRE-DIABETES: ICD-10-CM

## 2024-11-12 DIAGNOSIS — N76.0 VAGINITIS AND VULVOVAGINITIS: ICD-10-CM

## 2024-11-12 DIAGNOSIS — Z78.9 USES BIRTH CONTROL: ICD-10-CM

## 2024-11-12 DIAGNOSIS — F43.23 ADJUSTMENT DISORDER WITH MIXED ANXIETY AND DEPRESSED MOOD: ICD-10-CM

## 2024-11-12 LAB
CLUE CELLS: ABNORMAL
HCG UR QL: NEGATIVE
TRICHOMONAS, WET PREP: ABNORMAL
WBC'S/HIGH POWER FIELD, WET PREP: ABNORMAL
YEAST, WET PREP: ABNORMAL

## 2024-11-12 PROCEDURE — 81025 URINE PREGNANCY TEST: CPT | Performed by: INTERNAL MEDICINE

## 2024-11-12 PROCEDURE — 99214 OFFICE O/P EST MOD 30 MIN: CPT | Performed by: INTERNAL MEDICINE

## 2024-11-12 PROCEDURE — 87210 SMEAR WET MOUNT SALINE/INK: CPT | Performed by: INTERNAL MEDICINE

## 2024-11-12 RX ADMIN — MEDROXYPROGESTERONE ACETATE 150 MG: 150 INJECTION, SUSPENSION INTRAMUSCULAR at 10:29

## 2024-11-12 SDOH — HEALTH STABILITY: PHYSICAL HEALTH: ON AVERAGE, HOW MANY MINUTES DO YOU ENGAGE IN EXERCISE AT THIS LEVEL?: 0 MIN

## 2024-11-12 SDOH — HEALTH STABILITY: PHYSICAL HEALTH: ON AVERAGE, HOW MANY DAYS PER WEEK DO YOU ENGAGE IN MODERATE TO STRENUOUS EXERCISE (LIKE A BRISK WALK)?: 0 DAYS

## 2024-11-12 ASSESSMENT — PATIENT HEALTH QUESTIONNAIRE - PHQ9
SUM OF ALL RESPONSES TO PHQ QUESTIONS 1-9: 3
SUM OF ALL RESPONSES TO PHQ QUESTIONS 1-9: 3

## 2024-11-12 ASSESSMENT — PAIN SCALES - GENERAL: PAINLEVEL_OUTOF10: NO PAIN (0)

## 2024-11-12 ASSESSMENT — SOCIAL DETERMINANTS OF HEALTH (SDOH): HOW OFTEN DO YOU GET TOGETHER WITH FRIENDS OR RELATIVES?: PATIENT DECLINED

## 2024-11-12 NOTE — PROGRESS NOTES
"  Assessment & Plan       ICD-10-CM    1. Major depressive disorder, recurrent episode, mild (H)  F33.0       2. Adjustment disorder with mixed anxiety and depressed mood  F43.23       3. Essential hypertension  I10       4. Pre-diabetes  R73.03       5. Vaginitis and vulvovaginitis  N76.0 Wet prep - Clinic Collect      6. BMI 50.0-59.9, adult (H)  Z68.43 Adult Comprehensive Weight Management  Referral      7. Contraception  Z78.9 HCG qualitative urine POCT, Enter/Edit Result     HCG qualitative urine     HCG qualitative urine        Here today to review her ongoing medical issues.    Depression with anxiety and adjustment disorder.  She feels that her mood is currently doing fairly well.  She is taking very high-dose sertraline at 250 mg daily.  She does feel this dose is working well for her.  At this point we will continue with current dosing.  Prescriptions are up-to-date.    History of hypertension.  Blood pressure on my check today is controlled without medications.  Will need to keep close tabs on her blood pressure at future visits.    Prediabetes.  Being treated with metformin.  Plan to check A1c at least once per year.    Vaginitis symptoms.  Wet prep did not reveal a specific cause.  If her symptoms persist she will contact her gynecology office.    Morbid obesity.  BMI is greater than 50 and her weight is increasing.  We discussed management options.  She is interested in a referral to the weight management clinic.    Contraception.  She typically gets her Depo-Provera through her gynecology office.  She is over due for an injection.  hCG was negative and Depo was updated today        BMI  Estimated body mass index is 57 kg/m  as calculated from the following:    Height as of this encounter: 1.768 m (5' 9.61\").    Weight as of this encounter: 178.2 kg (392 lb 12.8 oz).     Yohan Beltran MD       Tomasa Jain is a 28 year old, presenting for the following health issues:  Recheck " Medication        11/12/2024     9:24 AM   Additional Questions   Roomed by [facility]     HPI     Followup of Mood Disorder   Taking Medication as prescribed: yes  Side Effects:  None  Medication Helping Symptoms:  yes    Depression and Anxiety   How are you doing with your depression since your last visit? No change  How are you doing with your anxiety since your last visit?  Worsened situation with mom  Are you having other symptoms that might be associated with depression or anxiety? No  Have you had a significant life event? Health Concerns  For her mother.   Do you have any concerns with your use of alcohol or other drugs? No    Treating with high dose Zoloft (250 mg daily).      2/15/2023     2:38 PM 2/15/2024     1:52 PM 11/12/2024     9:21 AM   PHQ   PHQ-9 Total Score 5 0 3    Q9: Thoughts of better off dead/self-harm past 2 weeks Not at all  Not at all Not at all        Patient-reported     We reviewed ongoing treatment options.  She feels that the current Zoloft dose is working well for her.    History of hypertension.  Blood pressures currently controlled without need for medication.  She is having no current active cardiac symptoms.    Morbid obesity.  BMI is over 50 and unfortunately her weight continues to increase.  We discussed weight management options.  Wt Readings from Last 4 Encounters:   11/12/24 (!) 178.2 kg (392 lb 12.8 oz)   05/07/24 (!) 174.6 kg (385 lb)   02/15/24 (!) 170.6 kg (376 lb)   02/21/23 (!) 169.2 kg (373 lb)      Patient noticed an unusual vaginal odor. Denies any pain or irritation or discharge. Has not changed soaps/detergents. Has tried OTC wipes to help but not effective.           Social History     Tobacco Use    Smoking status: Never     Passive exposure: Never    Smokeless tobacco: Never   Vaping Use    Vaping status: Never Used   Substance Use Topics    Alcohol use: No     Alcohol/week: 0.0 standard drinks of alcohol    Drug use: No         2/15/2023     2:38 PM 2/15/2024      "1:52 PM 11/12/2024     9:21 AM   PHQ   PHQ-9 Total Score 5 0 3    Q9: Thoughts of better off dead/self-harm past 2 weeks Not at all  Not at all Not at all        Patient-reported         5/15/2019    11:42 AM 5/3/2021    10:34 AM 2/15/2024     1:52 PM   RAMIRO-7 SCORE   Total Score 2 1 6         Objective    /89   Pulse 95   Temp 97.3  F (36.3  C) (Temporal)   Resp 20   Ht 1.768 m (5' 9.61\")   Wt (!) 178.2 kg (392 lb 12.8 oz)   SpO2 97%   BMI 57.00 kg/m    Body mass index is 57 kg/m .  Physical Exam   GEN: No distress  SKIN: No rashes  LUNGS: Clear to auscultation bilaterally. No rhonchi, rales, wheezes or retractions.  CV: Regular rate and rhythm.  No murmurs, rubs or gallops. Pulses 2+ radial.  ABD: BS+. S, ND.     Results for orders placed or performed in visit on 11/12/24   HCG qualitative urine     Status: Normal   Result Value Ref Range    hCG Urine Qualitative Negative Negative   Wet prep - Clinic Collect     Status: Abnormal    Specimen: Vagina; Swab   Result Value Ref Range    Trichomonas Absent Absent    Yeast Absent Absent    Clue Cells Absent Absent    WBCs/high power field 2+ (A) None            Signed Electronically by: Yohan Beltran MD    "

## 2024-11-12 NOTE — TELEPHONE ENCOUNTER
Pt had office visit today with her primary. Had Depo injection today. No future orders in place, routing to ordering provider to place orders.     Pt has upcoming appointment for depo injection on 01/29/2025.     Lillian Mcclure MA 11:48 AM 11/12/2024

## 2024-11-12 NOTE — PROGRESS NOTES
Clinic Administered Medication Documentation      Depo Provera Documentation    Depo-Provera Standing Order inclusion/exclusion criteria reviewed. {Reference  Depo-Provera Standing Order - click link to log in, then click link again to be taken directly to policy:[id number]}    Is this the initial or subsequent dose of Depo Provera? Subsequent dose - patient is not within the acceptable window of time (11-15 weeks) for subsequent injection. Pregnancy test is indicated. Pregnancy test result: negative   {Patient meets inclusion criteria for Depo.:[id number]}    Patient meets: inclusion criteria     Is there an active order (written within the past 365 days, with administrations remaining, not ) in the chart? Yes.     Prior to injection, verified patient identity using patient's name and date of birth. Medication was administered. Please see MAR and medication order for additional information.     Vial/Syringe: Single dose vial. Was entire vial of medication used? Yes    Patient instructed to remain in clinic for 15 minutes and report any adverse reaction to staff immediately.  NEXT INJECTION DUE: 25 - 25    Patient has no refills remaining. Refill encounter opened, order pended and Routed to the provider

## 2024-11-12 NOTE — NURSING NOTE
Clinic Administered Medication Documentation      Depo Provera Documentation    Depo-Provera Standing Order inclusion/exclusion criteria reviewed.     Is this the initial or subsequent dose of Depo Provera? Subsequent dose - patient is not within the acceptable window of time (11-15 weeks) for subsequent injection. Pregnancy test is indicated. Pregnancy test result: negative       Patient meets: inclusion criteria     Is there an active order (written within the past 365 days, with administrations remaining, not ) in the chart? Yes.     Prior to injection, verified patient identity using patient's name and date of birth. Medication was administered. Please see MAR and medication order for additional information.     Vial/Syringe: Single dose vial. Was entire vial of medication used? Yes    Patient instructed to remain in clinic for 15 minutes and report any adverse reaction to staff immediately.  NEXT INJECTION DUE: 25 - 25    Patient has no refills remaining. Refill encounter opened, order pended and Routed to the provider

## 2024-11-13 RX ORDER — MEDROXYPROGESTERONE ACETATE 150 MG/ML
150 INJECTION, SUSPENSION INTRAMUSCULAR
Status: ACTIVE | OUTPATIENT
Start: 2024-11-13 | End: 2025-11-08

## 2024-11-20 ENCOUNTER — MYC MEDICAL ADVICE (OUTPATIENT)
Dept: PEDIATRICS | Facility: CLINIC | Age: 29
End: 2024-11-20

## 2024-11-20 ENCOUNTER — OFFICE VISIT (OUTPATIENT)
Dept: PEDIATRICS | Facility: CLINIC | Age: 29
End: 2024-11-20
Payer: COMMERCIAL

## 2024-11-20 VITALS
BODY MASS INDEX: 41.95 KG/M2 | DIASTOLIC BLOOD PRESSURE: 80 MMHG | HEIGHT: 70 IN | WEIGHT: 293 LBS | SYSTOLIC BLOOD PRESSURE: 138 MMHG | OXYGEN SATURATION: 97 % | TEMPERATURE: 97.8 F | HEART RATE: 107 BPM

## 2024-11-20 DIAGNOSIS — H65.91 OME (OTITIS MEDIA WITH EFFUSION), RIGHT: ICD-10-CM

## 2024-11-20 DIAGNOSIS — J02.0 STREP THROAT: ICD-10-CM

## 2024-11-20 DIAGNOSIS — J02.9 SORE THROAT: ICD-10-CM

## 2024-11-20 DIAGNOSIS — I10 ESSENTIAL HYPERTENSION: Primary | ICD-10-CM

## 2024-11-20 LAB — DEPRECATED S PYO AG THROAT QL EIA: POSITIVE

## 2024-11-20 PROCEDURE — 87880 STREP A ASSAY W/OPTIC: CPT | Performed by: NURSE PRACTITIONER

## 2024-11-20 PROCEDURE — 99213 OFFICE O/P EST LOW 20 MIN: CPT | Performed by: NURSE PRACTITIONER

## 2024-11-20 PROCEDURE — G2211 COMPLEX E/M VISIT ADD ON: HCPCS | Performed by: NURSE PRACTITIONER

## 2024-11-20 RX ORDER — AZITHROMYCIN 250 MG/1
TABLET, FILM COATED ORAL
Qty: 6 TABLET | Refills: 0 | Status: SHIPPED | OUTPATIENT
Start: 2024-11-20 | End: 2024-11-25

## 2024-11-20 ASSESSMENT — PAIN SCALES - GENERAL: PAINLEVEL_OUTOF10: MILD PAIN (3)

## 2024-11-20 ASSESSMENT — ENCOUNTER SYMPTOMS: SORE THROAT: 1

## 2024-11-20 NOTE — LETTER
2024    Susy Huerta   1995        To Whom it May Concern;    Please excuse Susy Huerta from work on 24 due to illness.    Sincerely,        Jessica Frazier, NP

## 2024-11-20 NOTE — PROGRESS NOTES
Assessment & Plan     Essential hypertension  Slightly above baseline today, suspect illness contributing. Continue to monitor at next routine visit.  BP Readings from Last 3 Encounters:   11/20/24 138/80   11/12/24 129/89   05/07/24 118/78     Strep throat  Sore throat  Begin abx.  - Streptococcus A Rapid Screen w/Reflex to PCR - Clinic Collect  - COVID-19 Virus (Coronavirus) by PCR; Future  - COVID-19 Virus (Coronavirus) by PCR Nose  - azithromycin (ZITHROMAX) 250 MG tablet; Take 2 tablets (500 mg) by mouth daily for 1 day, THEN 1 tablet (250 mg) daily for 4 days.    OME (otitis media with effusion), right  Mild, abx for strep should treat ear as well.   - azithromycin (ZITHROMAX) 250 MG tablet; Take 2 tablets (500 mg) by mouth daily for 1 day, THEN 1 tablet (250 mg) daily for 4 days.      The longitudinal plan of care for the diagnosis(es)/condition(s) as documented were addressed during this visit. Due to the added complexity in care, I will continue to support Susy in the subsequent management and with ongoing continuity of care in partnership with PCP    Patient Instructions   You also have a right ear infection  which should clear up with azithromycin    Ibuprofen, 600-800 mg take 3 times daily as needed, take with food.    Tylenol 1,000 mg take 3 times daily as needed.    Use Cepacol drops or chloraseptic spray for sore throat.    You can gargle with warm salt water.     If not improving as expected in 2 days, follow-up with primary care provider or sooner if worsening.       Subjective   Susy is a 28 year old, presenting for the following health issues:  Pharyngitis        11/20/2024     8:03 AM   Additional Questions   Roomed by Marquita VELASQUEZ   Accompanied by N/A         11/20/2024     8:03 AM   Patient Reported Additional Medications   Patient reports taking the following new medications No     History of Present Illness       Headaches:   Since the patient's last clinic visit, headaches are: no  "change  The patient is getting headaches:  3 days now  She is not able to do normal daily activities when she has a migraine.  The patient is taking the following rescue/relief medications:  No rescue/relief medications   Patient states \"I get no relief\" from the rescue/relief medications.   The patient is taking the following medications to prevent migraines:  No medications to prevent migraines  In the past 4 weeks, the patient has gone to an Urgent Care or Emergency Room 0 times times due to headaches.    Reason for visit:  Strep test  Symptom onset:  1-3 days ago  Symptoms include:  Sore throat,achy,stuffy, headache, tired, a little dizzy  Symptom intensity:  Severe  Symptom progression:  Worsening  Had these symptoms before:  Yes  Has tried/received treatment for these symptoms:  No  What makes it worse:  Talking, tipping my head to gargle with salt water  What makes it better:  No   She is taking medications regularly.     BP Readings from Last 3 Encounters:   11/20/24 138/80   11/12/24 129/89   05/07/24 118/78       2 days ago  Sore throat really bad yesterday  Has some chills, feels hot  Glands in neck are sore  Able to swallow  Works for [employer]-talks a lot on the phone          Objective    /80 (BP Location: Right arm, Patient Position: Sitting, Cuff Size: Adult Large)   Pulse 107   Temp 97.8  F (36.6  C) (Temporal)   Ht 1.768 m (5' 9.61\")   Wt (!) 177.8 kg (392 lb)   SpO2 97%   BMI 56.88 kg/m    Body mass index is 56.88 kg/m .  Physical Exam   GENERAL: alert and no distress  EYES: Eyes grossly normal to inspection  HENT: normal cephalic/atraumatic, right ear: erythematous, left ear: normal: no effusions, no erythema, normal landmarks, nose and mouth without ulcers or lesions, oropharynx clear, oral mucous membranes moist, tonsillar hypertrophy +2 bl, and tonsillar erythema  NECK: bilateral submandibular and tonsillar cervical adenopathy  RESP: lungs clear to auscultation - no rales, rhonchi or " wheezes  CV: regular rate and rhythm, normal S1 S2, no S3 or S4, no murmur, click or rub, no peripheral edema     Results for orders placed or performed in visit on 11/20/24 (from the past 24 hours)   Streptococcus A Rapid Screen w/Reflex to PCR - Clinic Collect    Specimen: Throat; Swab   Result Value Ref Range    Group A Strep antigen Positive (A) Negative           Signed Electronically by: Jessica Frazier NP

## 2024-11-20 NOTE — PATIENT INSTRUCTIONS
You also have a right ear infection  which should clear up with azithromycin    Ibuprofen, 600-800 mg take 3 times daily as needed, take with food.    Tylenol 1,000 mg take 3 times daily as needed.    Use Cepacol drops or chloraseptic spray for sore throat.    You can gargle with warm salt water.     If not improving as expected in 2 days, follow-up with primary care provider or sooner if worsening.

## 2024-11-21 LAB — SARS-COV-2 RNA RESP QL NAA+PROBE: NEGATIVE

## 2024-12-15 ENCOUNTER — MYC MEDICAL ADVICE (OUTPATIENT)
Dept: PEDIATRICS | Facility: CLINIC | Age: 29
End: 2024-12-15
Payer: COMMERCIAL

## 2024-12-16 ENCOUNTER — TELEPHONE (OUTPATIENT)
Dept: PEDIATRICS | Facility: CLINIC | Age: 29
End: 2024-12-16
Payer: COMMERCIAL

## 2024-12-16 NOTE — LETTER
2024    Re:  Susy Huerta  :  1995                To Whom It May Concern:    I am the primary care physician for Susy Huerta. She has been evaluated in our urgent care twice int he past few weeks. She has ongoing health issues. Please excuse her from work 24 - 24 due to illness. She may return to work without restrictions 24.          Sincerely,        Yohan Beltran MD    
stem cell implantation, surgery and staf infection and full course abx ( left ankle) received to rm 19 aaox4 ambulatory w/ assistance d/t pain c/o swelling redness and pain to left ankle.  Pt denies fever, pain to left leg decreased ROM loss of sensation or numbness tingling to affected foot.  Pt p/w NAD breaths even unlabored skin warm dry intact left ankle red and swollen.  Awaiting further MD orders.

## 2024-12-16 NOTE — TELEPHONE ENCOUNTER
Letter written.  She may print from [employer] system or if she prefers a hand signed letter it is on my desk.

## 2024-12-16 NOTE — TELEPHONE ENCOUNTER
Patient's mother called. See telephone encounter 12/16/24.    Mara Pond RN 12/16/2024 11:20 AM  [facility]

## 2024-12-16 NOTE — TELEPHONE ENCOUNTER
Patient's mother calling (CTC on file). [employer] message was sent, but they wanted to also call.     Patient is requesting a letter be written to excuse her from work 12/16/24-12/18/24. Patient is on her third antibiotic, has continued to have a cough, and lost her voice over the weekend. She works in [place] for [employer] and cannot work without a voice.     Patient was seen on 11/20/24 at [place] and most recently at  on 12/13/24.    Please send letter to [employer] once written.     Mara Pond RN 12/16/2024 11:15 AM  [facility]

## 2025-01-16 ENCOUNTER — PATIENT OUTREACH (OUTPATIENT)
Dept: CARE COORDINATION | Facility: CLINIC | Age: 30
End: 2025-01-16
Payer: COMMERCIAL

## 2025-01-29 ENCOUNTER — ALLIED HEALTH/NURSE VISIT (OUTPATIENT)
Dept: PEDIATRICS | Facility: CLINIC | Age: 30
End: 2025-01-29
Payer: COMMERCIAL

## 2025-01-29 DIAGNOSIS — Z30.42 ENCOUNTER FOR SURVEILLANCE OF INJECTABLE CONTRACEPTIVE: Primary | ICD-10-CM

## 2025-01-29 PROCEDURE — 99207 PR NO CHARGE NURSE ONLY: CPT

## 2025-01-29 NOTE — PROGRESS NOTES
Clinic Administered Medication Documentation        Patient was given Depo Provera. Prior to medication administration, verified patient's identity using patient s name and date of birth. Please see MAR and medication order for additional information. Patient instructed to remain in clinic for 15 minutes.    Vial/Syringe: Single dose vial. Was entire vial of medication used? Yes    NEXT INJECTION DUE: 4/16/25 - 5/14/25     130/70   HPI:    Patient ID: Hussein Frances. is a 63year old male. Patient presents with:  Back Pain: back pain since saturday, LL back        Back Pain   This is a new problem. The current episode started in the past 7 days. The problem occurs constantly.  The p intact distal pulses. Pulmonary/Chest: Effort normal and breath sounds normal. No respiratory distress. He has no wheezes. He has no rales.    Musculoskeletal:      Lumbar back: He exhibits decreased range of motion, tenderness (left parasinous muscle), p

## 2025-01-30 ENCOUNTER — PATIENT OUTREACH (OUTPATIENT)
Dept: CARE COORDINATION | Facility: CLINIC | Age: 30
End: 2025-01-30
Payer: COMMERCIAL

## 2025-03-02 ENCOUNTER — HEALTH MAINTENANCE LETTER (OUTPATIENT)
Age: 30
End: 2025-03-02

## 2025-03-29 ENCOUNTER — HEALTH MAINTENANCE LETTER (OUTPATIENT)
Age: 30
End: 2025-03-29

## 2025-04-23 DIAGNOSIS — R73.03 PRE-DIABETES: ICD-10-CM

## 2025-04-23 DIAGNOSIS — F41.9 ANXIETY: ICD-10-CM

## 2025-04-23 DIAGNOSIS — F43.23 ADJUSTMENT DISORDER WITH MIXED ANXIETY AND DEPRESSED MOOD: ICD-10-CM

## 2025-04-23 RX ORDER — SERTRALINE HYDROCHLORIDE 100 MG/1
200 TABLET, FILM COATED ORAL
Qty: 180 TABLET | Refills: 0 | Status: SHIPPED | OUTPATIENT
Start: 2025-04-23

## 2025-04-23 RX ORDER — METFORMIN HYDROCHLORIDE 500 MG/1
1000 TABLET, EXTENDED RELEASE ORAL DAILY
Qty: 180 TABLET | Refills: 0 | Status: SHIPPED | OUTPATIENT
Start: 2025-04-23

## 2025-04-23 NOTE — TELEPHONE ENCOUNTER
Patient calling in, she didn't realize her prescriptions were expiring (last sent 1 year ago). Last OV 11/12/24.   Confirmed dosing and SIG with patient.    No breaks in medication.     Patient only has enough for tomorrow- routing high priority.     Corie Duggan, RN

## 2025-04-24 ENCOUNTER — PATIENT OUTREACH (OUTPATIENT)
Dept: CARE COORDINATION | Facility: CLINIC | Age: 30
End: 2025-04-24
Payer: COMMERCIAL

## 2025-06-10 ENCOUNTER — ALLIED HEALTH/NURSE VISIT (OUTPATIENT)
Dept: PEDIATRICS | Facility: CLINIC | Age: 30
End: 2025-06-10
Payer: COMMERCIAL

## 2025-06-10 VITALS — DIASTOLIC BLOOD PRESSURE: 66 MMHG | SYSTOLIC BLOOD PRESSURE: 120 MMHG

## 2025-06-10 DIAGNOSIS — Z78.9 USES BIRTH CONTROL: Primary | ICD-10-CM

## 2025-06-10 PROCEDURE — 99207 PR NO CHARGE NURSE ONLY: CPT

## 2025-06-10 PROCEDURE — 3074F SYST BP LT 130 MM HG: CPT

## 2025-06-10 PROCEDURE — 3078F DIAST BP <80 MM HG: CPT

## 2025-06-10 RX ADMIN — MEDROXYPROGESTERONE ACETATE 150 MG: 150 INJECTION, SUSPENSION INTRAMUSCULAR at 11:25

## 2025-06-27 RX ORDER — CLINDAMYCIN HYDROCHLORIDE 300 MG/1
CAPSULE ORAL
COMMUNITY
Start: 2024-11-27 | End: 2025-07-01

## 2025-06-27 NOTE — PROGRESS NOTES
"New Medical Weight Management Consult    PATIENT:  Susy Huerta   MRN:         [id number]   :         1995  ABBEY:         2025      Dear Yohan Beltran MD,    I had the pleasure of seeing your patient, Susy Huerta. Full intake/assessment was done to determine barriers to weight loss success and develop a treatment plan. Susy Huerta is a 29 year old female interested in treatment of medical problems associated with excess weight. She has a height of 5' 10\", a weight of 401 lbs 0 oz, and the calculated Body mass index is 57.54 kg/m .    Assessment & Plan   Problem List Items Addressed This Visit       Class 3 obesity (H) - Primary    2025 initial evaluation. Pt will start Phentermine 15mg daily. Will send updated BP/pulse 2 weeks after starting medication. Discussed use for weight loss, mechanism of action, and common side effects. Medication handout given in AVS.  Discussed monitoring for signs/symptoms of serotonin syndrome while on Zoloft.  Baseline labs ordered. When labs are back, pt will have Topiramate sent to pharmacy. Discussed off-label use for weight loss, mechanism of action, titration guidelines, and common side effects. Medication handout given in AVS.    We reviewed recommendations for muscle conservation including eating three meals daily, good protein intake, and strength training.   Dietician appointment tomorrow.         Relevant Medications    phentermine 15 MG capsule    Other Relevant Orders    Adult Nutrition  Referral    Adult Sleep Eval & Management Referral    CBC with Platelets [RDK077]    CMP [LAB17]    Vitamin D Deficiency [FWJ429]    Hemoglobin A1C [LAB90]    TSH with free T4 Reflex [[id number]]    Adjustment disorder with mixed anxiety and depressed mood    Relevant Orders    Adult Mental Health  Referral    Essential hypertension    Pre-diabetes    Discussed in clinic visit. Anticipate improvement with weight loss.       " "    Other Visit Diagnoses         Snores        Relevant Orders    Adult Sleep Eval & Management Referral             PROGRAM OVERVIEW  Discussed options available through [facility].   Education provided on chronic disease management of obesity.   We reviewed our program's volumes, risks, and outcomes data. All questions were answered.     SURGICAL WEIGHT LOSS   Option presented given pt BMI and current comorbid conditions. No current interest.      MEDICATIONS:  We discussed healthy habits to assist with weight loss. We reviewed medications associated with weight gain. We discussed the role of pharmacological agents in the treatment of obesity and the \"off-label\" use of medications in this practice. We reviewed medication that may assist with weight loss. Indications, contraindications, risks/benefits, and potential side effects were discussed. Explained medications must always be used together with lifestyle changes. Reviewed rationale for long term use of pharmacotherapy in chronic disease management for obesity.      AOM Considerations:  Phentermine:  SSRI consideration w/ Zoloft. Started today.  Topiramate: Birth control: Depo. Discussed as option and will start when baseline labs are done.   GLP-1:      Naltrexone:    Wellbutrin: Increased seizure risk w/ Zoloft.   Metformin: Already taking for prediabetes through PCP.   Contrave:  Qsymia:            PATIENT INSTRUCTIONS:  Mental health referral placed. You can call [phone] to schedule.  A Sleep Referral was ordered today. Please call [phone]  Labs ordered.  Please call [phone] to set up a lab appt.    Start Phentermine: Take 1 capsule daily.   Check blood pressure/pulse checked in 2 weeks and anytime you have a dose increase.  Send result through [facility].   [facility]Pulse [email or web address], [facility] ([facility]urse [facility]) [phone], or with a home " "machine.   5. Once labs are back and if normal, I will send a prescription for Topiramate.   Start Topiramate (Remember to drink plenty of fluids daily)   Week 1:Take 1 tablet (25 mg) at bedtime  Week 2 Take 2 tablets (50 mg) at bedtime  Week 3:Take 3 tablets (75 mg) at bedtime Stay at 3 tabs until you are seen again.        Goals:  Eat within 1 hour of waking. Try to eat every 4-6 hours. Prioritize protein 1st and fruit/veggies/fiber 2nd.  Strive to drink 64oz water throughout the day.  Try to do some sort of movement most days of the week, even if just for 5 minutes at a time. Strength train twice a week to preserve muscle.      Follow up:    Call [phone] to schedule next visit in October with Ema Solomon NP   Dietician appt 7/2/25     47 minutes spent on the date of the encounter doing chart review, history and exam, review test results, counseling, developing plan of care, documentation, and further activities as noted above.      She has the following co-morbidities:        7/1/2025     5:44 AM   --   I have the following health issues associated with obesity None of the above   I have the following symptoms associated with obesity Knee Pain    Depression    Back Pain   Prediabetes  HTN         No data to display                    7/1/2025     5:44 AM   Referring Provider   Please name the provider who referred you to Medical Weight Management  If you do not know, please answer \"I Don't Know\" Yohan Beltran   Referred by PCP on 11/12/24 7/1/2025     5:44 AM   Weight History   How concerned are you about your weight? Somewhat Concerned   I became overweight As a Child   The following factors have contributed to my weight gain Mental Health Issues    Eating Wrong Types of Food    Eating Too Much    Lack of Exercise    Genetic (Runs in the Family)    Stress   I have tried the following methods to lose weight Weight Watchers   The most weight I have ever lost was (lbs) 40   I have the following " family history of obesity/being overweight My mother is overweight    One or more of my siblings are overweight    Many of my relatives are overweight   How has your weight changed over the last year? Gained           7/1/2025     5:44 AM   Diet Recall Review with Patient   If you do eat breakfast, what types of food do you eat? Leftovers, pbj, cereal, yogurt   If you do eat supper, what types of food do you typically eat? Chucken nuggets sandwiches soup   If you do snack, what types of food do you typically eat? Chips jerky fruit snacks granola bars   How many of glasses of milk do you drink in a typical day? 1   If you do drink milk, what type? 2%   How many cans/bottles of sugar pop/soda/tea/sports drinks do you drink in a day? 1   How often do you have a drink of alcohol? Never   Wakes: 11A-12P  1st meal: 1pm  Then pt will snacks throughout day while patient is at work  2nd meal: 10:30P  Occasional snack before bed  Bedtime: 3-5A    Hydration: 1-2 regular Monster energy drink every. Juice. 1% or 2% milk. 24oz water during the day.        7/1/2025     5:44 AM   Eating Habits   Generally, my meals include foods like these bread, pasta, rice, potatoes, corn, crackers, sweet dessert, pop, or juice A Few Times a Week   Generally, my meals include foods like these fried meats, brats, burgers, french fries, pizza, cheese, chips, or ice cream Less Than Weekly   Eat fast food (like McDonalds, [employer], Taco Bell) A Few Times a Week   Eat at a buffet or sit-down restaurant Never   Eat most of my meals in front of the TV or computer Everyday   Often skip meals, eat at random times, have no regular eating times A Few Times a Week   Rarely sit down for a meal but snack or graze throughout A Few Times a Week   Eat extra snacks between meals A Few Times a Week   Eat most of my food at the end of the day Never   Eat in the middle of the night or wake up at night to eat Never   Eat extra snacks to prevent or correct low blood  sugar Never   Eat to prevent acid reflux or stomach pain Never   Worry about not having enough food to eat Never   I eat when I am depressed Once a Week   I eat when I am stressed Once a Week   I eat when I am bored A Few Times a Week   I eat when I am anxious Once a Week   I eat when I am happy or as a reward Less Than Weekly   I feel hungry all the time even if I just have eaten Never   Feeling full is important to me A Few Times a Week   I finish all the food on my plate even if I am already full Less Than Weekly   I can't resist eating delicious food or walk past the good food/smell Never   I eat/snack without noticing that I am eating Never   I eat when I am preparing the meal Never   I eat more than usual when I see others eating Never   I have trouble not eating sweets, ice cream, cookies, or chips if they are around the house Once a Week   I think about food all day Never   What foods, if any, do you crave? Sweets/Candy/Chocolate           7/1/2025     5:44 AM   Amount of Food   I feel out of control when eating Never   I eat a large amount of food, like a loaf of bread, a box of cookies, a pint/quart of ice cream, all at once Monthly   I eat a large amount of food even when I am not hungry Monthly   I eat rapidly Never   I eat alone because I feel embarrassed and do not want others to see how much I have eaten Never   I eat until I am uncomfortably full Never   I feel bad, disgusted, or guilty after I overeat Monthly   Denies intentionally vomiting after eating/using diuretics/laxatives or other purging type activities.  Denies binge eating.         7/1/2025     5:44 AM   Activity/Exercise History   How much of a typical 12 hour day do you spend sitting? Most of the Day   How much of a typical 12 hour day do you spend lying down? Less Than Half the Day   How much of a typical day do you spend walking/standing? Less Than Half the Day   How many hours (not including work) do you spend on the TV/Video  Games/Computer/Tablet/Phone? 4-5 Hours   How many times a week are you active for the purpose of exercise? Never   What keeps you from being more active? Too tired    Unsure What To Do    Other   How many total minutes do you spend doing some activity for the purpose of exercising when you exercise? None   Activity: decreased motivation. Mother does not tolerate exercise so pt usually doesn't exercise. Does stretching for work.    PAST MEDICAL HISTORY:  Past Medical History:   Diagnosis Date    Menorrhagia     Obese            7/1/2025     5:44 AM   Work/Social History Reviewed With Patient   My employment status is Full-Time   My job is    How much of your job is spent on the computer or phone? 100%   How many hours do you spend commuting to work daily? 0   What is your marital status? Single   Who do you live with? Mom   Who does the food shopping? Me       Social History     Tobacco Use    Smoking status: Never     Passive exposure: Never    Smokeless tobacco: Never   Vaping Use    Vaping status: Never Used   Substance Use Topics    Alcohol use: No     Alcohol/week: 0.0 standard drinks of alcohol    Drug use: No            7/1/2025     5:44 AM   Mental Health History Reviewed With Patient   Have you ever been physically or sexually abused? No   How often in the past 2 weeks have you felt little interest or pleasure in doing things? For Several Days   Over the past 2 weeks how often have you felt down, depressed, or hopeless? For Several Days   Sees mental health therapist?        7/1/2025     5:44 AM   Questions Reviewed With Patient   How ready are you to make changes regarding your weight? Number 1 = Not ready at all to make changes up to 10 = very ready. 7   How confident are you that you can change? 1 = Not confident that you will be successful making changes up to 10 = very confident. 4   Factors influencing confidence: Tried things in past that have been ineffective. Sees mother who will lose  and gain weight repeatedly. Worried it won't last.        7/1/2025     5:44 AM   Sleep History Reviewed With Patient   How many hours do you sleep at night? 7   Mother reports snoring and witnessed apneic episodes    MEDICATIONS:   Current Outpatient Medications   Medication Sig Dispense Refill    metFORMIN (GLUCOPHAGE XR) 500 MG 24 hr tablet TAKE TWO TABLETS BY MOUTH DAILY 180 tablet 0    phentermine 15 MG capsule Take 1 capsule (15 mg) by mouth every morning. 30 capsule 3    sertraline (ZOLOFT) 100 MG tablet TAKE TWO TABLETS BY MOUTH DAILY 180 tablet 0    sertraline (ZOLOFT) 50 MG tablet Take 1 tablet by mouth once daily 90 tablet 0       ALLERGIES:   Allergies   Allergen Reactions    Amoxicillin Anaphylaxis     Other reaction(s): Edema,generalized    Sulfa Antibiotics Other (See Comments), Difficulty breathing and Hives     Other reaction(s): Edema,generalized  n/v    Cefprozil Hives     PN: LW Reaction: HIVES       ROS:    HEENT  H/O glaucoma:  no  Cardiovascular  CAD:   no  Palpitations:   no  HTN:    yes  Gastrointestinal  GERD:   no  Constipation:   no  Liver Dz:   no  H/O Pancreatitis:  no  H/O Gastroparesis no  H/O Gallbladder Dz: no  Psychiatric  Anxiety:   yes  Depression:  yes  Bipolar:  no  H/O ETOH/Drug abuse: no  H/O eating disorder: no  Endocrine  PMH/FMH of MTC or MEN2:  no  Neurologic:  H/O seizures:   no  Headaches:  Yes, managed with eating if hungry or drinking water or caffeine, or Excedrin  Memory Impairment:  no    H/O kidney stones:  no  Kidney disease:  no  Current birth control:  Depo Provera    LABS/RECORDS REVIEWED:  Hemoglobin A1C   Date Value Ref Range Status   02/15/2024 5.5 0.0 - 5.6 % Final     Comment:     Normal <5.7%   Prediabetes 5.7-6.4%    Diabetes 6.5% or higher     Note: Adopted from ADA consensus guidelines.   05/28/2021 5.6 0 - 5.6 % Corrected     Comment:     Normal <5.7% Prediabetes 5.7-6.4%  Diabetes 6.5% or higher - adopted from ADA   consensus guidelines.  CORRECTED  ON 05/28 AT 1116: PREVIOUSLY REPORTED AS 6.6 Normal <5.7%   Prediabetes 5.7 6.4%  Diabetes 6.5% or higher   adopted from ADA consensus   guidelines.       TSH   Date Value Ref Range Status   01/09/2019 2.97 0.40 - 4.00 mU/L Final     Sodium   Date Value Ref Range Status   12/19/2022 143 136 - 145 mmol/L Final   07/27/2016 138 133 - 144 mmol/L Final     Potassium   Date Value Ref Range Status   12/19/2022 4.0 3.4 - 5.3 mmol/L Final   07/27/2016 4.4 3.4 - 5.3 mmol/L Final     Chloride   Date Value Ref Range Status   12/19/2022 112 (H) 98 - 107 mmol/L Final   07/27/2016 109 96 - 110 mmol/L Final     Carbon Dioxide   Date Value Ref Range Status   07/27/2016 22 20 - 32 mmol/L Final     Carbon Dioxide (CO2)   Date Value Ref Range Status   12/19/2022 18 (L) 22 - 29 mmol/L Final     Anion Gap   Date Value Ref Range Status   12/19/2022 13 7 - 15 mmol/L Final   07/27/2016 7 3 - 14 mmol/L Final     Glucose   Date Value Ref Range Status   12/19/2022 92 70 - 99 mg/dL Final   07/27/2016 68 (L) 70 - 99 mg/dL Final     Urea Nitrogen   Date Value Ref Range Status   12/19/2022 10.1 6.0 - 20.0 mg/dL Final   07/27/2016 10 7 - 19 mg/dL Final     Creatinine   Date Value Ref Range Status   12/19/2022 0.65 0.51 - 0.95 mg/dL Final   07/27/2016 0.62 0.50 - 1.00 mg/dL Final     GFR Estimate   Date Value Ref Range Status   12/19/2022 >90 >60 mL/min/1.73m2 Final     Comment:     Effective December 21, 2021 eGFRcr in adults is calculated using the 2021 CKD-EPI creatinine equation which includes age and gender (Katie corona al., NEJM, DOI: 10.1056/KOGEvq1239786)   07/27/2016 >90  Non  GFR Calc   >60 mL/min/1.7m2 Final     Calcium   Date Value Ref Range Status   12/19/2022 9.4 8.6 - 10.0 mg/dL Final   07/27/2016 8.8 (L) 9.1 - 10.3 mg/dL Final     ALT   Date Value Ref Range Status   12/19/2022 31 10 - 35 U/L Final   07/27/2016 28 0 - 50 U/L Final     AST   Date Value Ref Range Status   12/19/2022 30 10 - 35 U/L Final   07/27/2016 9 0 -  "35 U/L Final     Cholesterol   Date Value Ref Range Status   12/19/2022 154 <200 mg/dL Final   05/28/2021 151 <200 mg/dL Final     HDL Cholesterol   Date Value Ref Range Status   05/28/2021 46 (L) >49 mg/dL Final     Direct Measure HDL   Date Value Ref Range Status   12/19/2022 37 (L) >=50 mg/dL Final     LDL Cholesterol Calculated   Date Value Ref Range Status   12/19/2022 95 <=100 mg/dL Final   05/28/2021 84 <100 mg/dL Final     Comment:     Desirable:       <100 mg/dl     Triglycerides   Date Value Ref Range Status   12/19/2022 110 <150 mg/dL Final   05/28/2021 104 <150 mg/dL Final     Hemoglobin   Date Value Ref Range Status   12/13/2024 11.9 11.7 - 15.7 g/dL Final   01/13/2017 12.1 11.7 - 15.7 g/dL Final           BP Readings from Last 6 Encounters:   07/01/25 111/78   06/10/25 120/66   12/13/24 (!) 146/90   11/20/24 138/80   11/12/24 129/89   05/07/24 118/78       Pulse Readings from Last 6 Encounters:   07/01/25 90   12/13/24 90   11/20/24 107   11/12/24 95   01/13/24 110   02/21/23 101       PHYSICAL EXAM:  /78   Pulse 90   Ht 5' 10\" (1.778 m)   Wt (!) 401 lb (181.9 kg)   SpO2 99%   BMI 57.54 kg/m    GENERAL: Healthy, alert and no distress  RESP: No audible wheeze, cough, or visible cyanosis.  No visible retractions or increased work of breathing.    SKIN: Visible skin clear. No significant rash, abnormal pigmentation or lesions.  PSYCH: Mentation appears normal, affect normal/bright, judgement and insight intact, normal speech and appearance well-groomed.    COUNSELING:   Reviewed obesity as a chronic disease and comprehensive management stratagies.      We discussed Bariatric Basics including:  -eating 3 meals daily  -eating protein first  -eating slowly, chewing food well  -avoiding/limiting calorie containing beverages  -limiting carbohydrates and changing to whole grains  -limiting restaurant or cafeteria eating to twice a week or less    We discussed the importance of restorative sleep and " stress management in maintaining a healthy weight.  We discussed insulin resistance and glycemic index as it relates to appetite and weight control.   We discussed the importance of physical activity including cardiovascular and strength training in maintaining a healthier weight and explored viable options.  Patient education of above written in AVS.      Sincerely,    Ema Solomon NP

## 2025-07-01 ENCOUNTER — OFFICE VISIT (OUTPATIENT)
Dept: SURGERY | Facility: CLINIC | Age: 30
End: 2025-07-01
Payer: COMMERCIAL

## 2025-07-01 VITALS
OXYGEN SATURATION: 99 % | DIASTOLIC BLOOD PRESSURE: 78 MMHG | HEART RATE: 90 BPM | BODY MASS INDEX: 41.95 KG/M2 | HEIGHT: 70 IN | WEIGHT: 293 LBS | SYSTOLIC BLOOD PRESSURE: 111 MMHG

## 2025-07-01 DIAGNOSIS — I10 ESSENTIAL HYPERTENSION: ICD-10-CM

## 2025-07-01 DIAGNOSIS — E66.813 CLASS 3 SEVERE OBESITY DUE TO EXCESS CALORIES WITHOUT SERIOUS COMORBIDITY WITH BODY MASS INDEX (BMI) OF 50.0 TO 59.9 IN ADULT (H): Primary | ICD-10-CM

## 2025-07-01 DIAGNOSIS — R73.03 PRE-DIABETES: ICD-10-CM

## 2025-07-01 DIAGNOSIS — F43.23 ADJUSTMENT DISORDER WITH MIXED ANXIETY AND DEPRESSED MOOD: ICD-10-CM

## 2025-07-01 DIAGNOSIS — R06.83 SNORES: ICD-10-CM

## 2025-07-01 PROCEDURE — 99204 OFFICE O/P NEW MOD 45 MIN: CPT | Performed by: NURSE PRACTITIONER

## 2025-07-01 PROCEDURE — 3078F DIAST BP <80 MM HG: CPT | Performed by: NURSE PRACTITIONER

## 2025-07-01 PROCEDURE — 3074F SYST BP LT 130 MM HG: CPT | Performed by: NURSE PRACTITIONER

## 2025-07-01 PROCEDURE — G2211 COMPLEX E/M VISIT ADD ON: HCPCS | Performed by: NURSE PRACTITIONER

## 2025-07-01 RX ORDER — PHENTERMINE HYDROCHLORIDE 15 MG/1
15 CAPSULE ORAL EVERY MORNING
Qty: 30 CAPSULE | Refills: 3 | Status: SHIPPED | OUTPATIENT
Start: 2025-07-01 | End: 2025-10-29

## 2025-07-01 ASSESSMENT — SLEEP AND FATIGUE QUESTIONNAIRES
HOW LIKELY ARE YOU TO NOD OFF OR FALL ASLEEP WHILE SITTING AND TALKING TO SOMEONE: WOULD NEVER DOZE
HOW LIKELY ARE YOU TO NOD OFF OR FALL ASLEEP WHILE WATCHING TV: SLIGHT CHANCE OF DOZING
HOW LIKELY ARE YOU TO NOD OFF OR FALL ASLEEP WHEN YOU ARE A PASSENGER IN A CAR FOR AN HOUR WITHOUT A BREAK: WOULD NEVER DOZE
HOW LIKELY ARE YOU TO NOD OFF OR FALL ASLEEP WHILE SITTING INACTIVE IN A PUBLIC PLACE: WOULD NEVER DOZE
HOW LIKELY ARE YOU TO NOD OFF OR FALL ASLEEP WHILE SITTING AND READING: SLIGHT CHANCE OF DOZING
HOW LIKELY ARE YOU TO NOD OFF OR FALL ASLEEP WHILE SITTING QUIETLY AFTER LUNCH WITHOUT ALCOHOL: WOULD NEVER DOZE
HOW LIKELY ARE YOU TO NOD OFF OR FALL ASLEEP WHILE LYING DOWN TO REST IN THE AFTERNOON WHEN CIRCUMSTANCES PERMIT: HIGH CHANCE OF DOZING
HOW LIKELY ARE YOU TO NOD OFF OR FALL ASLEEP IN A CAR, WHILE STOPPED FOR A FEW MINUTES IN TRAFFIC: WOULD NEVER DOZE

## 2025-07-01 NOTE — PATIENT INSTRUCTIONS
It was nice to talk with you today! Below is the plan discussed.-  SHEY Boo      Pt Instructions:  Mental health referral placed. You can call [phone] to schedule.  A Sleep Referral was ordered today. Please call [phone]  Labs ordered.  Please call [phone] to set up a lab appt.    Start Phentermine: Take 1 capsule daily.   Check blood pressure/pulse checked in 2 weeks and anytime you have a dose increase.  Send result through [facility].   [facility] [email or web address] [facility] [employer]/pharmacy, at a [facility] Primary care office (nurse visit) [phone], or with a home machine.   5. Once labs are back and if normal, I will send a prescription for Topiramate.   Start Topiramate (Remember to drink plenty of fluids daily)   Week 1:Take 1 tablet (25 mg) at bedtime  Week 2 Take 2 tablets (50 mg) at bedtime  Week 3:Take 3 tablets (75 mg) at bedtime Stay at 3 tabs until you are seen again.     Goals:  Eat within 1 hour of waking. Try to eat every 4-6 hours. Prioritize protein 1st and fruit/veggies/fiber 2nd.  Strive to drink 64oz water throughout the day.  Try to do some sort of movement most days of the week, even if just for 5 minutes at a time. Strength train twice a week to preserve muscle.      Follow up:    Call [phone] to schedule next visit in October with Ema Solomon, NP   Dietician appt 7/2/25                            Obesity is a complex chronic disease    Obesity is a brain disease that causes dysregulation of our energy system.  It is not a character flaw it is a chemistry flaw.      It has many causes.  80% is genetic.  These can be influenced by factors like an altered microbiome (microbes in your gut), endocrine disrupting chemicals, sedentary lifestyle, low nutrient/ high calorie foods, and weight promoting medications.     Energy homeostasis is tightly regulated by the central nervous system. The hypothalamus is the primary center for the  "regulation of energy balance.  The thermostat is influenced by signals in response to fullness, hunger, and others. With obesity are several impairments in those signals to your hypothalamus.  It causes your thermostat \"set point\"  is be too high.      When you lose weight, you body's response it to defend its set point. Signals will be sent to your hypothalamus to decrease your metabolism, increase hunger, and decrease feeling of fullness.  This ultimately drives your weight back to you set range.     Medications can help regulate those signals.  If you respond well to obesity medications, these should be used lifelong.  Otherwise, if removed, your body will go back to that dysregulated state and defend its set point.  (You will regain your weight.)    Obesity is a chronic disease. It is hard to treat but we are learning more every day.  Treatment is improving by leaps and bounds.  The best thing you can do is continue close follow up with your obesity medicine provider.  Together we can tackle this disease.      WEGOVY (semaglutide)      Wegovy (semaglutide) injection 2.4 mg is an injectable prescription medicine used for adults with obesity (BMI >=30) or overweight (excess weight) (BMI >=27) who also have weight-related medical problems to help them lose weight and keep the weight off.  It is a GLP-1 agonist medication. GLP1 agonists stimulate the hormone GLP-1 in your body to allow you to feel full quickly and stay full longer.    Directions:  Start Wegovy (semaglutide) 0.25 mg once weekly for 4 weeks, then if tolerating increase to 0.5 mg weekly for 4 weeks, then if tolerating increase to 1 mg weekly for 4 weeks, then if tolerating increase to 1.7 mg weekly for 4 weeks, then if tolerating increase to 2.4 mg weekly thereafter.      -Each Wegovy pen is a once weekly single-dose prefilled pen with a pen injector already built within the pen. Discard the Wegovy pen after use in sharps container.     Common Side " Effects:    nausea, headache, diarrhea, stomach upset.  If these become unmanageable call or mychart.    Serious Side effects:   Pancreatitis (inflammation of the pancreas) has been associated with this type of medication, but is very rare.Symptoms of pancreatitis include: Pain in your upper stomach area which may travel to your back and be worse after eating.     Tips to help with side effects:  For Nausea/Heartburn:  Eat small, protein focused meals throughout the day  Stay hydrated.-The medication can decrease your drive for thirst  Eat slowly. STOP at the first sign of satisfaction  Eat at regular intervals, letting hours pass without eating- can cause nausea.  AVOID foods that are high in fat and sugar .      For constipation:  Stay hydrated and make sure you are moving.    Miralax 1 scoop/packet up daily    Ducosate Sodium 1 pill twice daily (stool softener)    Storage:   Store the prescription in the refrigerator. Once it is time to use the Wegovy pen, you can keep the pen at room temperature and it is good for up to 28 days at room temperature.     How to inject:  For a video on how to use the pen please go to:  https://www.Ancera/about-wegovy/how-to-use-the-wegovy-pen.html#itemTwo      Out of Pocket GLP1a options:  Wegovy or Zepbound pens out of pocket cost (>$1000/month cash price without savings card)   Zepbound  Cost of any dose with savings card (link below to sign up): $650/month with card at any pharmacy   https://[email or web address].[email or web address]/enroll/checkEnrollment - Medicaid, Medicare or other government insurances cannot use savings cards  Wegovy   Cost of any dose with savings card (link below to sign up): $650/month with card at any pharmacy   https://www.Ancera/coverage-and-savings/save-on-wegovy.html - Medicaid, Medicare or other government insurances cannot use savings cards  Cost for any dose through [facility]: $499/month - This is [facility] mail order pharmacy    Terms and Conditions of Use  [facility]  Pharmacy   Additional $5 per 10 pack of for administrations supplies (syringe/needles, etc)   Zepbound Vials through [facility] - vials only available in 2.5 mg, 5 mg, 7.5mg, 10 mg doses  [email or web address]  $349/month for 2.5mg vials  $499/month for 5mg vials   7.5 mg and 10 mg vials now available with as well for $499/month with regular refills (cost increases if refills not consistently filled at least every 45 days - see more info at [employer] website)  Additional $5 per month for administrations supplies (syringe/needles, etc)      Sublingual Semaglutide at [facility] is now offering sublingual semaglutide. This is an alternative option for patients who may not have access to or insurance coverage for commercially available semaglutide products.  Currently, the FDA-approved forms of semaglutide include:  Rybelsus (oral tablet) - approved for Type 2 Diabetes  Ozempic (injectable) - approved for Type 2 Diabetes  Wegovy (injectable) - approved for chronic weight management and cardiovascular risk reduction in certain populations  Unlike FDA-approved products, sublingual semaglutide has not been studied in clinical trials, so its effectiveness and safety in this form are not fully known. However, it may be a helpful alternative when other formulations are not accessible.    Sublingual means the medication is placed under the tongue, where it dissolves and is absorbed directly into the bloodstream. At [facility], sublingual semaglutide is made by combining commercially available Rybelsus tablets with a base called Submagna to create a liquid that can be absorbed under the tongue.    Compounding is permitted when a medication is not available in the needed form, like sublingual semaglutide, and a provider writes a prescription for a specific  patient. [facility] follows strict safety and quality standards, including [place] Pharmacopeia (USP) 795 guidelines for non-sterile compounding. Since sublingual semaglutide is not available commercially, it is eligible for compounding under these guidelines.     Availability:  Sublingual semaglutide is expected to be available long term.    Dosing:  Available doses include 0.5 mg, 1 mg, 1.5 mg, 2 mg and 2.5 mg. Further dose escalation above 2.5 mg can be can discussed with your provider based on your individual response and if appropriate.     Your provider may prescribe 1 of 2 different concentrations of sublingual semaglutide - 2 mg/mL or 5 mg/mL. It is important to pay attention to the dosing and which concentration prescribed, so you as the patient know how much to take of sublingual semaglutide daily.     Sublingual Semaglutide 2 mg/mL   mg mL   0.5 mg 0.25 mL   1 mg 0.5 mL   1.5 mg 0.75 mL   2 mg 1 mL   2.5 mg 1.25 mL       Sublingual Semaglutide 5 mg/mL   mg mL   0.5 mg 0.1 mL   1 mg 0.2 mL   1.5 mg 0.3 mL   2 mg 0.4 mL   2.5 mg 0.5 mL       New Start:   Option 1 (2 mg/mL): begin with 0.5 mg (0.25 mL) once daily for 2-4 weeks. If tolerated, increase to 1 mg (0.5 mL) once daily for at least 4 weeks. If necessary thereafter, dose can be increased by 0.5 mg (0.25 mL) every 4 weeks (e.g. 1.5 mg, then 2 mg, then 2.5 mg).    Option 2 (5 mg/mL): begin with 0.5 mg (0.1 mL) once daily for 2-4 weeks. If tolerated, increase to 1 mg (0.2 mL) once daily for at least 4 weeks. If necessary thereafter, dose can be increased by 0.5 mg (0.1 mL) every 4 weeks (e.g. 1.5 mg, then 2 mg, then 2.5 mg).    Follow the dosing and escalation recommendations prescribed by your provider. Do not escalate dosing further than prescribed by your provider.      From injectable Semaglutide: There are no official studies comparing injectable and sublingual semaglutide doses. If you are switching from injectable to  sublingual semaglutide, your provider will help choose the most appropriate dose for you.    Administration:  Sublingual semaglutide is administered once daily. Flavor is spearmint similar to Milla double mint gum or spearmint toothpaste.     Shake well before each use (product has a thick, honey-like consistency)  Use syringe to draw your dose from bottle   Place medication under your tongue and hold under tongue a long as possible (at least 60-90 seconds), then swallow  If the volume feels like too much, you may split the dose into two parts, taken 5-10 minutes apart  Avoid eating, drinking, or smoking for 30 minutes afterward    Helpful Routine Tip: Brush your teeth and rinse your mouth, take your medication, then shower or get ready for the day.    Storage:  Store at room temperature. The medication remains stable for 90 days.    Common Side Effects:  Side effects may be similar to FDA-approved semaglutide products (Rybelsus, Wegovy, Ozempic), though the sublingual version has not been formally studied. Common side effects include: nausea, diarrhea, constipation, headache, indigestion, tiredness (fatigue), stomach upset or abdominal pain. Less commonly, semaglutide can cause low blood sugar (symptoms: shaky, dizzy, sweaty, agitation). Contact your care team if side effects are bothersome or unmanageable or if you are concerned for low blood sugar.     Tips to reduce side effects:   Eat regular meals (don t skip meals)  Stop eating when feeling satiated/satisfied.  Stay hydrated--aim for at least 64 oz of water daily    Obtaining Medication From Pharmacy:   You will receive an automated call once the pharmacy receives your prescription. You must call back and speak to a team member to confirm name, product, shipping, and cost. If you have not received a call within 3 business days, call the pharmacy directly.    [facility] Phone:  [phone]    Shipping available to: [place], [place], [place], [place], [place], [place],  [place].     Managing Refills:  Call [phone]   Download the [facility] patricia Download  Online:  [email or web address]         PHENTERMINE    We are starting Phentermine.  Our patients on Phentermine find that they:    >feel less hunger    >find it easier to push the plate away   >have an easier time eating less    For some of our patients, these feelings are very real and immediate. For other patients, the feelings are less obvious. They don't feel much of a change but find they've lost weight. Like all weight loss medications, Phentermine  works best when you help it work. This means:  1. Having less tempting high calorie (fattening) food around the house or office. (For people with strong cravings this is very important.)   2. Staying away from situations or people that may trigger your cravings .   3. Eating out only one time or less each week.  4. Eating your meals at a table with the TV or computer off.    Side-effects. Phentermine is generally well tolerated. The most common side effects are dry mouth and constipation. Because it is a stimulant, pts can have feelings of racing pulse or rapid heart beat. Some people can get an elevated blood pressure. Because of this we ask you to get your blood pressure and pulse checked within 1-2 weeks of starting the medication.     For any questions or concerns please send a Level Chef message to our team or call our weight management call center at [phone] during regular business hours. For questions during evenings or weekends your messages will be addressed during the next business day.  For emergencies please call 911 or seek immediate medical care.      TOPIRAMATE (TOPAMAX)    Topiramate (Topamax) is a medication that is used most often to treat migraine headaches or for seizures. It has also been found to help with weight loss. Although it's not currently FDA approved for weight loss, it has been used safely for a number of years to help  people who are carrying extra weight.   Topiramate helps with weight loss by working on areas of the brain to quiet down signals related to eating.     Topiramate may make you:    >feel less interest in eating in between meals   >think less about food and eating   >find it easier to push the plate away   >find giving up pop easier    >have an easier time eating less    For some of our patients, the pills work right away. They feel and think quite differently about food. Other patients don't feel much of a change but find in fact they have lost weight! Like all weight loss medications, topiramate works best when you help it work.  This means:   1) Have less tempting high calorie (fattening) food around the house or office    2) Have lower calorie food (fruits, vegetables,low fat meats and dairy) for snacks    3) Eat out only one time or less each week.   4) Eat your meals at a table with the TV or computer off.    Side-effects Topiramate is generally well tolerated. The main side-effects we see are:   Tingling in hands,feet, or face (usually not very troublesome)   Mental confusion and word finding trouble (about 10% of patients have this.)     Feeling sleepy or a bit dopey- this goes away very soon after starting.    One of the dangers of topiramate is the possibility of birth defects--if you get pregnant when you are on it, there is the risk that your baby will be born with a cleft lip or palate.  If you are on topiramate and of child bearing age, you need to be on a reliable form of birth control or refrain from sexual intercourse.     For any questions or concerns please send a Cloudmeter message to our team or call our weight management call center at [phone] during regular business hours. For questions during evenings or weekends your messages will be addressed during the next business day.  For emergencies please call 911 or seek immediate medical care.      QSYMIA (phentermine and topiramate)    We are  considering starting Qsymia. This is a specific obesity medication and is a combination of Phentermine and Topiramate, formulated as a sustained release, taken one time a day. There are a few different doses of the medication. Doses come in 3.75/23 mg (usually skipped), 7.5/46 mg, 11.25/69 mg, and 15/92 mg.       For some of our patients, the pills work right away. They feel and think quite differently about food. Other patients don't feel much of a change but find in fact they have lost weight! Like all weight loss medications, Qsymia works best when you help it work.  This means:   1) Have less tempting high calorie (fattening) food around the house or office    2) Have lower calorie food (fruits, vegetables,low fat meats and dairy) for snacks    3) Eat out only one time or less each week.   4) Eat your meals at a table with the TV or computer off.    Side-effects (generally well tolerated because it is a sustained release medication)    Topiramate:   -Tingling in hands,feet, or face (usually not very troublesome)   -Mental confusion and word finding trouble (about 10% of patients have this.)     -Feeling sleepy or a bit dopey- this goes away very soon after starting.      Phentermine:    -Feelings of racing pulse or rapid heart beat.    -Increased anxiety.   -Some people can get an elevated blood pressure. Because of this we may have  you come back within a week or so of starting the medication for a blood pressure check.    One of the dangers of topiramate is the possibility of birth defects--if you get pregnant when you are on it, there is the risk that your baby will be born with a cleft lip or palate.  If you are on topiramate and of child bearing age, you need to be on a reliable form of birth control or refrain from sexual intercourse.     It is very rare for insurance to pay for this and it typically costs around $200 per month. Please check out the website www.qsymia.com and sign up for the Pharmacy  "savings program to save $75 a month for 12 months if eligible. The medication can also be paid for out of pocket. It may require a prior authorization which could take up to 1-2 weeks.      For any questions or concerns please send a B[employer]er [employer] message to our team or call our weight management call center at [phone] during regular business hours. For questions during evenings or weekends your messages will be addressed during the next business day.  For emergencies please call 911 or seek immediate medical care.     Naltrexone    Naltrexone is a medication that is used most often to help people who are troubled by dependence on prescription pain killers or alcohol. It has also been found to help with weight loss. Although it's not currently FDA approved for weight loss, it has been used safely for a number of years to help people who are carrying extra weight.     Just how Naltrexone helps with weight loss has not been exactly determined.  It seems to work by quieting down brain signals related to strong food cravings. Many of our patients use the word \"addiction\" to describe their feelings and constant thoughts about food. It makes sense then to treat the feeling of dependence on food, outside of real hunger, with a medication designed to help with other sorts of dependence.     Our patients on Naltrexone find that they:    >feel less interest in food   >think less about food and eating and have more time to think of other things   >find it easier to push the plate away   >have an easier time eating less    For some of our patients, these feelings are very immediate. Other patients, don't feel much of a change but find they've lost weight. Like all weight loss medications, Naltrexone works best when you help it work. This means:  1. Having less tempting high calorie (fattening) food around the house or office. (For people with strong cravings this is very important.)   2. Staying away from situations or people " that may trigger your cravings .   3. Eating out only one time or less each week.  4. Eating your meals at a table with the TV or computer off.    Side-effects. Naltrexone is generally well tolerated. The main side-effect we see is  nausea or a woozy feeling. A small number of people feel quite ill. Most people have a mild reaction and some people have no reaction at all.  The good news is that this feeling does go away.     In order to avoid nausea, please start the medication with half a pill for the first few days. Go on to a full pill if you are feeling well.      If you  are nauseated on 1/2 a pill it is okay to cut back to 1/4 pill ( a very small amount). Take this for a couple of days and work your way back up to a 1/2 pill and then a whole pill. Taking the medication at night or with food  to start also may help prevent the feeling of nausea.       WARNING: This medication blocks the action of opioid type pain medications.    If you routinely take narcotic pain medication like Codeine, Oxycontin,Percocet,Morphine,Dilaudid or Methodone, do not take this until you have talked with weight management staff.   2.  If you are planning surgery you should stop Naltrexone 4 days prior to the surgery.   3.  If you have an injury that requires pain medication, make sure the health care staff knows you take Naltrexone.       For any questions/concerns contact [facility] [phone]     Bupropion (Wellbutrin)    We are starting Bupropion (Wellbutrin). Bupropion helps lessen appetite and may mildly increase and energy.      Instructions:  Take 1 tablet in the morning for the first week, if tolerating then increase to one tablet in the morning and one tablet in the evening.  (If you have trouble with sleep you can take your second dose in the afternoon instead)    For some of our patients the medication works right away. Other patients don't feel much of a change but find they've lost weight. Like all  weight loss medications, bupropion works best when you help it work. This means:  1. Having less tempting high calorie (fattening) food around the house or office. (For people with strong cravings this is very important.)   2. Staying away from situations or people that may trigger your cravings .   3. Eating out only one time or less each week.  4. Eating your meals at a table with the TV or computer off.    Side-effects: The most common side effect include: nausea; constipation; headache;  trouble sleeping; and dry mouth.     Call the nurse at [phone] if you have any questions or concerns. (Do not stop taking it if you don't think it's working. For some people it works without them knowing it.)       In order to get refills of this or any medication we prescribe you must be seen in the medical weight mgmt clinic every 2-4 months.      CONTRAVE (bupropion and naltrexone)    We are considering starting Contrave. Contrave is specifically prescribed for obesity. It is a combination of two medications, Naltrexone and Bupropione (Wellbutrin). The Bupropion helps lessen appetite and the Naltrexone works by blocking certain receptors in the brain and curbing cravings.      For some of our patients the medication works right away. Other patients don't feel much of a change but find they've lost weight. Like all weight loss medications, Contrave works best when you help it work. This means:  1. Having less tempting high calorie (fattening) food around the house or office. (For people with strong cravings this is very important.)   2. Staying away from situations or people that may trigger your cravings .   3. Eating out only one time or less each week.  4. Eating your meals at a table with the TV or computer off.    WARNING: This medication blocks the action of opioid type pain medications. If you routinely take any medication like Codeine, Oxycontin, Percocet, Morphine, Dilaudid or Methodone, do not take this until  you have talked with weight management staff.     FYI- If you are planning on having an elective surgery, you should start titrating yourself off Contrave 4 weeks prior to surgery. This would entail decreasing the same way you started the medication, by taking one tablet less per week for 4 weeks, until you are able to stop the medication. If you need to stop it quicker, you can take 1 tablet in the morning and 1 tablet in the evening for 2 weeks, and then stop the medication. Please don't hesitate to call if you have any questions regarding this.    Dosing as follows:  Week 1- 1 tablet in the morning  Week 2- 1 tablet in the morning and 1 tablet at bedtime  Week 3- 2 tablets in the morning and 1 tablet at bedtime  Week 4 and thereafter- 2 tablets in the morning and 2 tablets at bedtime    Side-effects: The most common side effect include: nausea; constipation; headache; vomiting; dizziness; diarrhea; trouble sleeping; and dry mouth.     This medication typically isn t covered by insurance and will require a prior authorization, which can take 1-2 weeks to review. Patients can visit [email or web address] and sign up for the Pharmacy savings program and receive the medication for $60-70 per month for 12 months if eligible.    For any questions or concerns please send a [employer] message to our team or call our weight management call center at [phone] during regular business hours. For questions during evenings or weekends your messages will be addressed during the next business day.  For emergencies please call 911 or seek immediate medical care.     (Do not stop taking it if you don't think it's working. For some people it works without them knowing it.)

## 2025-07-01 NOTE — ASSESSMENT & PLAN NOTE
7/1/2025 initial evaluation. Pt will start Phentermine 15mg daily. Will send updated BP/pulse 2 weeks after starting medication. Discussed use for weight loss, mechanism of action, and common side effects. Medication handout given in AVS.  Discussed monitoring for signs/symptoms of serotonin syndrome while on Zoloft.  Baseline labs ordered. When labs are back, pt will have Topiramate sent to pharmacy. Discussed off-label use for weight loss, mechanism of action, titration guidelines, and common side effects. Medication handout given in AVS.    We reviewed recommendations for muscle conservation including eating three meals daily, good protein intake, and strength training.   Dietician appointment tomorrow.

## 2025-07-02 ENCOUNTER — PATIENT OUTREACH (OUTPATIENT)
Dept: CARE COORDINATION | Facility: CLINIC | Age: 30
End: 2025-07-02

## 2025-07-02 ENCOUNTER — VIRTUAL VISIT (OUTPATIENT)
Dept: SURGERY | Facility: CLINIC | Age: 30
End: 2025-07-02
Payer: COMMERCIAL

## 2025-07-02 DIAGNOSIS — E66.813 CLASS 3 SEVERE OBESITY DUE TO EXCESS CALORIES WITHOUT SERIOUS COMORBIDITY WITH BODY MASS INDEX (BMI) OF 50.0 TO 59.9 IN ADULT (H): Primary | ICD-10-CM

## 2025-07-02 PROCEDURE — 97802 MEDICAL NUTRITION INDIV IN: CPT | Mod: 95 | Performed by: DIETITIAN, REGISTERED

## 2025-07-02 NOTE — PATIENT INSTRUCTIONS
Guillermo Jain-  Welcome to the [facility], [place]! It was great to visit with you and learn about your interest in weight loss.  Below are the goals we discussed.  Goals:  Eat within 1 hour of waking then every 4-5 hours (3 meal/day)  Focus on alternatives to emotional eating (Legos, adult coloring, games on phone)  Reduce energy drinks and fruit juice by at least 1/2      Nutrition Educational Materials:  My Plate     Protein Sources for Weight Loss     Building a Healthy Relationship with Food    Tips for Weight Loss and Weight Management     Please call [phone] to schedule your next visit with a Dietitian in 2 months.    Please reach out to your care team through "[employer]t with any questions or concerns.    Thanks!  Carlos Mehta RD, LD  [facility], [place]

## 2025-07-02 NOTE — PROGRESS NOTES
"Susy is a 29 year old who is being evaluated via a billable video visit.      The patient has been notified of following:     \"This video visit will be conducted via a call between you and your physician/provider. We have found that certain health care needs can be provided without the need for an in-person physical exam.  This service lets us provide the care you need with a video conversation.  If a prescription is necessary we can send it directly to your pharmacy.  If lab work is needed we can place an order for that and you can then stop by our lab to have the test done at a later time.    Video visits are billed at different rates depending on your insurance coverage.  Please reach out to your insurance provider with any questions.    If during the course of the call the physician/provider feels a video visit is not appropriate, you will not be charged for this service.\"    Patient has given verbal consent for Video visit? Yes    How would you like to obtain your AVS? MyChart    If the video visit is dropped, the invitation should be resent by: Text to cell phone: [phone]    Will anyone else be joining your video visit? No    I    Video-Visit Details    Type of service:  Video Visit    Originating Location (pt. Location): Home    Distant Location (provider location):   [facility]    Platform used for Video Visit: [employer]    Video Start Time: 11:33    Video End Time: 11:59     MEDICAL WEIGHT LOSS INITIAL EVALUATION  Patient accompanied by:self  DIAGNOSIS:  Class III Obesity    NUTRITION/EXERCISE HISTORY:  Per weight loss provider note with edits during the visit:  7/1/2025     5:44 AM    Diet Recall Review with Patient   If you do eat breakfast, what types of food do you eat? Leftovers, pbj, cereal, yogurt with honey and granola or berries   If you do eat supper, what types of food do you typically eat? Chucken nuggets sandwiches soup   If you do snack, what types of " food do you typically eat? Chips jerky fruit snacks granola bars   How many of glasses of milk do you drink in a typical day? 1   If you do drink milk, what type? 2%   How many cans/bottles of sugar pop/soda/tea/sports drinks do you drink in a day? 1   How often do you have a drink of alcohol? Never   Wakes: 11A-12P  1st meal: 1pm  Then pt will snacks throughout day while patient is at work  2nd meal: 10:30P  Occasional snack before bed-3X per day  Bedtime: 3-5A    Hydration: 1-2 regular Monster energy drink every. 16 oz Juice a few times per week. 1% or 2% milk. 24oz water during the day.          7/1/2025     5:44 AM   Eating Habits   Generally, my meals include foods like these bread, pasta, rice, potatoes, corn, crackers, sweet dessert, pop, or juice A Few Times a Week   Generally, my meals include foods like these fried meats, brats, burgers, french fries, pizza, cheese, chips, or ice cream Less Than Weekly   Eat fast food (like McDonalds, Burger [employer], Taco Bell) A Few Times a Week   Eat at a buffet or sit-down restaurant Never   Eat most of my meals in front of the TV or computer Everyday   Often skip meals, eat at random times, have no regular eating times A Few Times a Week   Rarely sit down for a meal but snack or graze throughout A Few Times a Week   Eat extra snacks between meals A Few Times a Week   Eat most of my food at the end of the day Never   Eat in the middle of the night or wake up at night to eat Never   Eat extra snacks to prevent or correct low blood sugar Never   Eat to prevent acid reflux or stomach pain Never   Worry about not having enough food to eat Never   I eat when I am depressed Once a Week   I eat when I am stressed Once a Week   I eat when I am bored A Few Times a Week   I eat when I am anxious Once a Week   I eat when I am happy or as a reward Less Than Weekly   I feel hungry all the time even if I just have eaten Never   Feeling full is important to me A Few Times a Week   I  finish all the food on my plate even if I am already full Less Than Weekly   I can't resist eating delicious food or walk past the good food/smell Never   I eat/snack without noticing that I am eating Never   I eat when I am preparing the meal Never   I eat more than usual when I see others eating Never   I have trouble not eating sweets, ice cream, cookies, or chips if they are around the house Once a Week   I think about food all day Never   What foods, if any, do you crave? Sweets/Candy/Chocolate              7/1/2025     5:44 AM   Amount of Food   I feel out of control when eating Never   I eat a large amount of food, like a loaf of bread, a box of cookies, a pint/quart of ice cream, all at once Monthly   I eat a large amount of food even when I am not hungry Monthly   I eat rapidly Never   I eat alone because I feel embarrassed and do not want others to see how much I have eaten Never   I eat until I am uncomfortably full Never   I feel bad, disgusted, or guilty after I overeat Monthly   Denies intentionally vomiting after eating/using diuretics/laxatives or other purging type activities.  Denies binge eating.           7/1/2025     5:44 AM   Activity/Exercise History   How much of a typical 12 hour day do you spend sitting? Most of the Day   How much of a typical 12 hour day do you spend lying down? Less Than Half the Day   How much of a typical day do you spend walking/standing? Less Than Half the Day   How many hours (not including work) do you spend on the TV/Video Games/Computer/Tablet/Phone? 4-5 Hours   How many times a week are you active for the purpose of exercise? Never   What keeps you from being more active? Too tired    Unsure What To Do    Other   How many total minutes do you spend doing some activity for the purpose of exercising when you exercise? None   Activity: decreased motivation. Mother does not tolerate exercise so pt usually doesn't exercise. Does stretching for work.    ADDITIONAL  "INFORMATION: Works 1:30-10 PM and is 100% remote. Mom is starting medical weight loss program also. Lives with mom and does most of the grocery shopping. Pt is in charge of th cooking.Does not like to handle raw meat.    ANTHROPOMETRICS:  Height: 70\"   Weight: 401 lb    BMI:  57.54 kg/m2  NUTRITION DIAGNOSIS:   Obese class III related to food and nutrition knowledge deficit as evidence by patient's subjective statements and BMI of 57.54 kg/m2   NUTRITION INTERVENTIONS  Nutrition Prescription:  Recommend modified energy- nutrient intake  Implementation:  Nutrition Education (Content):  Discussed portion sizes/plate guidelines   Determined alternative to emotional eating  Reviewed healthy snacking and beverage choices  Provided: Building a Healthy Relationship with Food, Plate Guidelines, Protein Sources for Weight Loss, Planning Fast and Healthy Dinners    Nutrition Education (Application):   Patient to practice goals as stated below  Patient verbalizes understanding of diet by asking about use of meal kits  Expected patient engagement: good    Goals:  Eat within 1 hour of waking then every 4-5 hours (3 meal/day)  Focus on alternatives to emotional eating (Legos, adult coloring, games on phone)  Reduce energy drinks and fruit juice by at least 1/2      FOLLOW UP AND MONITORING:    Other  - follow up in 8 weeks.     TIME SPENT WITH PATIENT:   25 minutes   Carlos Mehta RD, LD  [facility], [place]   "

## 2025-07-05 ENCOUNTER — PATIENT OUTREACH (OUTPATIENT)
Dept: CARE COORDINATION | Facility: CLINIC | Age: 30
End: 2025-07-05
Payer: COMMERCIAL

## 2025-08-14 ENCOUNTER — PATIENT OUTREACH (OUTPATIENT)
Dept: CARE COORDINATION | Facility: CLINIC | Age: 30
End: 2025-08-14
Payer: COMMERCIAL

## 2025-08-26 ENCOUNTER — ALLIED HEALTH/NURSE VISIT (OUTPATIENT)
Dept: PEDIATRICS | Facility: CLINIC | Age: 30
End: 2025-08-26
Payer: COMMERCIAL

## 2025-08-26 VITALS — SYSTOLIC BLOOD PRESSURE: 136 MMHG | HEART RATE: 105 BPM | DIASTOLIC BLOOD PRESSURE: 85 MMHG

## 2025-08-26 DIAGNOSIS — Z30.42 DEPO-PROVERA CONTRACEPTIVE STATUS: Primary | ICD-10-CM

## 2025-08-26 PROCEDURE — 96372 THER/PROPH/DIAG INJ SC/IM: CPT | Performed by: ADVANCED PRACTICE MIDWIFE

## 2025-08-26 PROCEDURE — 3075F SYST BP GE 130 - 139MM HG: CPT

## 2025-08-26 PROCEDURE — 3079F DIAST BP 80-89 MM HG: CPT

## 2025-08-26 PROCEDURE — 99207 PR NO CHARGE NURSE ONLY: CPT

## 2025-08-26 RX ADMIN — MEDROXYPROGESTERONE ACETATE 150 MG: 150 INJECTION, SUSPENSION INTRAMUSCULAR at 11:59
